# Patient Record
Sex: FEMALE | Race: OTHER | NOT HISPANIC OR LATINO | URBAN - METROPOLITAN AREA
[De-identification: names, ages, dates, MRNs, and addresses within clinical notes are randomized per-mention and may not be internally consistent; named-entity substitution may affect disease eponyms.]

---

## 2021-12-25 ENCOUNTER — INPATIENT (INPATIENT)
Facility: HOSPITAL | Age: 51
LOS: 19 days | Discharge: NURSING, REHAB OR LONG-TERM CARE | DRG: 922 | End: 2022-01-14
Attending: INTERNAL MEDICINE | Admitting: HOSPITALIST
Payer: MEDICARE

## 2021-12-25 VITALS
DIASTOLIC BLOOD PRESSURE: 74 MMHG | SYSTOLIC BLOOD PRESSURE: 122 MMHG | WEIGHT: 104.94 LBS | OXYGEN SATURATION: 97 % | HEART RATE: 112 BPM | TEMPERATURE: 99 F | HEIGHT: 67 IN | RESPIRATION RATE: 17 BRPM

## 2021-12-25 DIAGNOSIS — Z29.9 ENCOUNTER FOR PROPHYLACTIC MEASURES, UNSPECIFIED: ICD-10-CM

## 2021-12-25 DIAGNOSIS — G82.50 QUADRIPLEGIA, UNSPECIFIED: ICD-10-CM

## 2021-12-25 LAB
ALBUMIN SERPL ELPH-MCNC: 4.3 G/DL — SIGNIFICANT CHANGE UP (ref 3.3–5)
ALP SERPL-CCNC: 120 U/L — SIGNIFICANT CHANGE UP (ref 40–120)
ALT FLD-CCNC: 10 U/L — SIGNIFICANT CHANGE UP (ref 10–45)
ANION GAP SERPL CALC-SCNC: 18 MMOL/L — HIGH (ref 5–17)
AST SERPL-CCNC: 18 U/L — SIGNIFICANT CHANGE UP (ref 10–40)
BASOPHILS # BLD AUTO: 0.04 K/UL — SIGNIFICANT CHANGE UP (ref 0–0.2)
BASOPHILS NFR BLD AUTO: 0.7 % — SIGNIFICANT CHANGE UP (ref 0–2)
BILIRUB SERPL-MCNC: 0.5 MG/DL — SIGNIFICANT CHANGE UP (ref 0.2–1.2)
BUN SERPL-MCNC: 10 MG/DL — SIGNIFICANT CHANGE UP (ref 7–23)
CALCIUM SERPL-MCNC: 9.9 MG/DL — SIGNIFICANT CHANGE UP (ref 8.4–10.5)
CHLORIDE SERPL-SCNC: 104 MMOL/L — SIGNIFICANT CHANGE UP (ref 96–108)
CO2 SERPL-SCNC: 20 MMOL/L — LOW (ref 22–31)
CREAT SERPL-MCNC: 0.32 MG/DL — LOW (ref 0.5–1.3)
EOSINOPHIL # BLD AUTO: 0.14 K/UL — SIGNIFICANT CHANGE UP (ref 0–0.5)
EOSINOPHIL NFR BLD AUTO: 2.3 % — SIGNIFICANT CHANGE UP (ref 0–6)
GLUCOSE SERPL-MCNC: 96 MG/DL — SIGNIFICANT CHANGE UP (ref 70–99)
HCT VFR BLD CALC: 41 % — SIGNIFICANT CHANGE UP (ref 34.5–45)
HGB BLD-MCNC: 14 G/DL — SIGNIFICANT CHANGE UP (ref 11.5–15.5)
IMM GRANULOCYTES NFR BLD AUTO: 0.7 % — SIGNIFICANT CHANGE UP (ref 0–1.5)
LYMPHOCYTES # BLD AUTO: 1.61 K/UL — SIGNIFICANT CHANGE UP (ref 1–3.3)
LYMPHOCYTES # BLD AUTO: 26.4 % — SIGNIFICANT CHANGE UP (ref 13–44)
MCHC RBC-ENTMCNC: 32.2 PG — SIGNIFICANT CHANGE UP (ref 27–34)
MCHC RBC-ENTMCNC: 34.1 GM/DL — SIGNIFICANT CHANGE UP (ref 32–36)
MCV RBC AUTO: 94.3 FL — SIGNIFICANT CHANGE UP (ref 80–100)
MONOCYTES # BLD AUTO: 0.52 K/UL — SIGNIFICANT CHANGE UP (ref 0–0.9)
MONOCYTES NFR BLD AUTO: 8.5 % — SIGNIFICANT CHANGE UP (ref 2–14)
NEUTROPHILS # BLD AUTO: 3.75 K/UL — SIGNIFICANT CHANGE UP (ref 1.8–7.4)
NEUTROPHILS NFR BLD AUTO: 61.4 % — SIGNIFICANT CHANGE UP (ref 43–77)
NRBC # BLD: 0 /100 WBCS — SIGNIFICANT CHANGE UP (ref 0–0)
PLATELET # BLD AUTO: 317 K/UL — SIGNIFICANT CHANGE UP (ref 150–400)
POTASSIUM SERPL-MCNC: 4 MMOL/L — SIGNIFICANT CHANGE UP (ref 3.5–5.3)
POTASSIUM SERPL-SCNC: 4 MMOL/L — SIGNIFICANT CHANGE UP (ref 3.5–5.3)
PROT SERPL-MCNC: 7.8 G/DL — SIGNIFICANT CHANGE UP (ref 6–8.3)
RBC # BLD: 4.35 M/UL — SIGNIFICANT CHANGE UP (ref 3.8–5.2)
RBC # FLD: 15.5 % — HIGH (ref 10.3–14.5)
SARS-COV-2 RNA SPEC QL NAA+PROBE: NEGATIVE — SIGNIFICANT CHANGE UP
SODIUM SERPL-SCNC: 142 MMOL/L — SIGNIFICANT CHANGE UP (ref 135–145)
WBC # BLD: 6.1 K/UL — SIGNIFICANT CHANGE UP (ref 3.8–10.5)
WBC # FLD AUTO: 6.1 K/UL — SIGNIFICANT CHANGE UP (ref 3.8–10.5)

## 2021-12-25 PROCEDURE — 99285 EMERGENCY DEPT VISIT HI MDM: CPT

## 2021-12-25 PROCEDURE — 93010 ELECTROCARDIOGRAM REPORT: CPT

## 2021-12-25 PROCEDURE — 99223 1ST HOSP IP/OBS HIGH 75: CPT | Mod: GC

## 2021-12-25 RX ORDER — BACLOFEN 100 %
20 POWDER (GRAM) MISCELLANEOUS ONCE
Refills: 0 | Status: COMPLETED | OUTPATIENT
Start: 2021-12-25 | End: 2021-12-25

## 2021-12-25 RX ORDER — SODIUM CHLORIDE 9 MG/ML
1000 INJECTION INTRAMUSCULAR; INTRAVENOUS; SUBCUTANEOUS ONCE
Refills: 0 | Status: COMPLETED | OUTPATIENT
Start: 2021-12-25 | End: 2021-12-25

## 2021-12-25 RX ORDER — GABAPENTIN 400 MG/1
300 CAPSULE ORAL ONCE
Refills: 0 | Status: COMPLETED | OUTPATIENT
Start: 2021-12-25 | End: 2021-12-25

## 2021-12-25 RX ADMIN — SODIUM CHLORIDE 1000 MILLILITER(S): 9 INJECTION INTRAMUSCULAR; INTRAVENOUS; SUBCUTANEOUS at 22:27

## 2021-12-25 RX ADMIN — GABAPENTIN 300 MILLIGRAM(S): 400 CAPSULE ORAL at 22:05

## 2021-12-25 RX ADMIN — Medication 20 MILLIGRAM(S): at 22:06

## 2021-12-25 RX ADMIN — Medication 0.5 MILLIGRAM(S): at 22:06

## 2021-12-25 NOTE — ED PROVIDER NOTE - EKG ADDITIONAL QUESTION - PERFORMED INDEPENDENT VISUALIZATION
[facility], [place]    Procedure: IR Procedure Note    Date/Time: 7/31/2020 2:32 PM  Performed by: Aleksey Hernandez MD  Authorized by: Aleksey Hernandez MD   IR Fellow Physician:  Radiology Resident Physician: Aleksey Hernandez      UNIVERSAL PROTOCOL   Site Marked: NA  Prior Images Obtained and Reviewed:  Yes  Required items: Required blood products, implants, devices and special equipment available    Patient identity confirmed:  Verbally with patient, arm band, provided demographic data and hospital-assigned identification number  Patient was reevaluated immediately before administering moderate or deep sedation or anesthesia  Confirmation Checklist:  Patient's identity using two indicators, relevant allergies, procedure was appropriate and matched the consent or emergent situation and correct equipment/implants were available  Time out: Immediately prior to the procedure a time out was called    Universal Protocol: the Joint Commission Universal Protocol was followed    Preparation: Patient was prepped and draped in usual sterile fashion           ANESTHESIA    Anesthesia: Local infiltration  Local Anesthetic:  Lidocaine 1% without epinephrine      SEDATION    Patient Sedated: Yes    Sedation:  Midazolam and fentanyl  Vital signs: Vital signs monitored during sedation    See dictated procedure note for full details.  Findings: Successful liver biopsy    Specimens: none    Complications: None    Condition: Stable    Plan: Return to patient care unit for postprocedural care    PROCEDURE   Patient Tolerance:  Patient tolerated the procedure well with no immediate complications    Length of time physician/provider present for 1:1 monitoring during sedation: 25      
Yes
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
## 2021-12-25 NOTE — H&P ADULT - NSHPPHYSICALEXAM_GEN_ALL_CORE
PHYSICAL EXAM:  General: NAD, appears very fatigued, pale  HEENT: NC/AT; PERRL, clear conjunctiva  Neck: supple  Respiratory: CTA b/l  Cardiovascular: holosystolic murmur   Abdomen: soft, NT/ND; +BS x4  : urostomy bag in place. urine clear/light yellow.  Extremities: 2+ peripheral pulses b/l; no LE edema  Skin: normal color and turgor; decubitus sacral ulcer with dressing, ulcer on right ankle with dressing, wound/abrasion on left wrist.   Neurological: AAOx4  Psychiatry:  blunted affect

## 2021-12-25 NOTE — H&P ADULT - NSHPLABSRESULTS_GEN_ALL_CORE
LABS:                        14.0   6.10  )-----------( 317      ( 25 Dec 2021 20:58 )             41.0     12-25    142  |  104  |  10  ----------------------------<  96  4.0   |  20<L>  |  0.32<L>    Ca    9.9      25 Dec 2021 20:58    TPro  7.8  /  Alb  4.3  /  TBili  0.5  /  DBili  x   /  AST  18  /  ALT  10  /  AlkPhos  120  12-25        LIVER FUNCTIONS - ( 25 Dec 2021 20:58 )  Alb: 4.3 g/dL / Pro: 7.8 g/dL / ALK PHOS: 120 U/L / ALT: 10 U/L / AST: 18 U/L / GGT: x

## 2021-12-25 NOTE — H&P ADULT - PROBLEM SELECTOR PLAN 2
- sw consult  - pt & nutrition consutls  - wound consult - sw consult  - pt & nutrition consults  - wound consult
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
## 2021-12-25 NOTE — ED ADULT NURSE NOTE - NSIMPLEMENTINTERV_GEN_ALL_ED
Implemented All Fall with Harm Risk Interventions:  [place] to call system. Call bell, personal items and telephone within reach. Instruct patient to call for assistance. Room bathroom lighting operational. Non-slip footwear when patient is off stretcher. Physically safe environment: no spills, clutter or unnecessary equipment. Stretcher in lowest position, wheels locked, appropriate side rails in place. Provide visual cue, wrist band, yellow gown, etc. Monitor gait and stability. Monitor for mental status changes and reorient to person, place, and time. Review medications for side effects contributing to fall risk. Reinforce activity limits and safety measures with patient and family. Provide visual clues: red socks.
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
## 2021-12-25 NOTE — ED PROVIDER NOTE - PHYSICAL EXAMINATION
CONST: nontoxic NAD speaking in full sentences  HEAD: atraumatic  EYES: conjunctivae clear, PERRL, EOMI  ENT: tacky mucous membranes  NECK: supple/FROM  CARD: tachy regular no murmurs  CHEST: ctab no r/r/w  ABD: soft, nd, nttp, no rebound/guarding  BACK: stage III decub ulcer  EXT: FROM, symmetric distal pulses intact  SKIN: warm, dry, no pedal edema/ttp/rash, cap refill <2sec  NEURO: a+ox3, contractured, 3/5 strength xBUE, 0/5 strength xBLE

## 2021-12-25 NOTE — H&P ADULT - PROBLEM SELECTOR PLAN 1
Patient presenting with Anion Gap 18, CO2 20 on BMP. BHB 2.3. Fatigued on presentation.  Concern for poor nutrition over past 2-3 days. Patient reports not taking PO since being discharged to rehab 12/22 as the facility was not assisting her with feeds. While she reports no dysphagia, she does not have adequate use of her hands to be able to feed herself.   Likely mild fasting ketosis.   - receiving NS bolus and then NS at 200 cc/hr  - nutrition consult  - nursing instructed to provide feeding assitance Patient presenting with Anion Gap 18, CO2 20 on BMP. BHB 2.3. Fatigued on presentation.  Concern for poor nutrition over past 2-3 days. Patient reports not taking PO since being discharged to rehab 12/22 as the facility was not assisting her with feeds. While she reports no dysphagia, she does not have adequate use of her hands to be able to feed herself.   Likely mild fasting ketosis.   - receiving NS bolus and then d5/lr at 100 cc/hr  - nutrition consult  - nursing instructed to provide feeding assistance  - dysphagia screen bedside  - AM lactate, BMP, anion gap f/u

## 2021-12-25 NOTE — ED ADULT TRIAGE NOTE - CHIEF COMPLAINT QUOTE
pt ARIANNA  from [facility] " for hypertension as per EMS pt BP was 150/79 " pt states " my bp is usually low. my back and all by body hurts "

## 2021-12-25 NOTE — H&P ADULT - PROBLEM SELECTOR PLAN 6
F: s/p 1L NS bolus. NS at 200 cc/hr overnight  E: Replete electrolytes as needed, K>4, M>2  N: regular diet  DVT ppx: lovenox 40 q24h  GI ppx: Senna, dulcolax suppository  Dispo:  [facility]    CODE STATUS: Full Code F: s/p 1L NS bolus. D5/LR at 100 cc/hr  E: Replete electrolytes as needed, K>4, M>2  N: regular diet (dysphagia screen)  DVT ppx: lovenox 40 q24h  GI ppx: Senna, dulcolax suppository  Dispo:  [facility]    CODE STATUS: Full Code

## 2021-12-25 NOTE — H&P ADULT - ASSESSMENT
51y yo Female  with PMH of bedbound, remote traumatic c4 quadriplegia, dysreflexia, recently hospitalized for b/l pna ([facility]) discharged to rehab on 12/22, [facility].

## 2021-12-25 NOTE — H&P ADULT - NSHPSOCIALHISTORY_GEN_ALL_CORE
Tobacco: no  EtOH: rare  Drug Use: daily marijuana use smoke and edibles for pain control  Lives in apartment on [address]
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
## 2021-12-26 DIAGNOSIS — L89.90 PRESSURE ULCER OF UNSPECIFIED SITE, UNSPECIFIED STAGE: ICD-10-CM

## 2021-12-26 DIAGNOSIS — D72.10 EOSINOPHILIA, UNSPECIFIED: ICD-10-CM

## 2021-12-26 DIAGNOSIS — E87.2 ACIDOSIS: ICD-10-CM

## 2021-12-26 DIAGNOSIS — T74.01XA ADULT NEGLECT OR ABANDONMENT, CONFIRMED, INITIAL ENCOUNTER: ICD-10-CM

## 2021-12-26 DIAGNOSIS — G62.9 POLYNEUROPATHY, UNSPECIFIED: ICD-10-CM

## 2021-12-26 DIAGNOSIS — R09.89 OTHER SPECIFIED SYMPTOMS AND SIGNS INVOLVING THE CIRCULATORY AND RESPIRATORY SYSTEMS: ICD-10-CM

## 2021-12-26 DIAGNOSIS — R71.0 PRECIPITOUS DROP IN HEMATOCRIT: ICD-10-CM

## 2021-12-26 LAB
ALBUMIN SERPL ELPH-MCNC: 3.4 G/DL — SIGNIFICANT CHANGE UP (ref 3.3–5)
ALP SERPL-CCNC: 89 U/L — SIGNIFICANT CHANGE UP (ref 40–120)
ALT FLD-CCNC: 8 U/L — LOW (ref 10–45)
ANION GAP SERPL CALC-SCNC: 10 MMOL/L — SIGNIFICANT CHANGE UP (ref 5–17)
APPEARANCE UR: CLEAR — SIGNIFICANT CHANGE UP
AST SERPL-CCNC: 13 U/L — SIGNIFICANT CHANGE UP (ref 10–40)
B-OH-BUTYR SERPL-SCNC: 0.7 MMOL/L — HIGH
B-OH-BUTYR SERPL-SCNC: 2.3 MMOL/L — HIGH
BACTERIA # UR AUTO: PRESENT /HPF
BASOPHILS # BLD AUTO: 0.04 K/UL — SIGNIFICANT CHANGE UP (ref 0–0.2)
BASOPHILS # BLD AUTO: 0.04 K/UL — SIGNIFICANT CHANGE UP (ref 0–0.2)
BASOPHILS NFR BLD AUTO: 1 % — SIGNIFICANT CHANGE UP (ref 0–2)
BASOPHILS NFR BLD AUTO: 1 % — SIGNIFICANT CHANGE UP (ref 0–2)
BILIRUB SERPL-MCNC: 0.4 MG/DL — SIGNIFICANT CHANGE UP (ref 0.2–1.2)
BILIRUB UR-MCNC: NEGATIVE — SIGNIFICANT CHANGE UP
BUN SERPL-MCNC: 9 MG/DL — SIGNIFICANT CHANGE UP (ref 7–23)
CALCIUM SERPL-MCNC: 8.5 MG/DL — SIGNIFICANT CHANGE UP (ref 8.4–10.5)
CHLORIDE SERPL-SCNC: 109 MMOL/L — HIGH (ref 96–108)
CO2 SERPL-SCNC: 22 MMOL/L — SIGNIFICANT CHANGE UP (ref 22–31)
COLOR SPEC: YELLOW — SIGNIFICANT CHANGE UP
CREAT SERPL-MCNC: 0.23 MG/DL — LOW (ref 0.5–1.3)
DIFF PNL FLD: ABNORMAL
EOSINOPHIL # BLD AUTO: 0.29 K/UL — SIGNIFICANT CHANGE UP (ref 0–0.5)
EOSINOPHIL # BLD AUTO: 0.3 K/UL — SIGNIFICANT CHANGE UP (ref 0–0.5)
EOSINOPHIL NFR BLD AUTO: 7.4 % — HIGH (ref 0–6)
EOSINOPHIL NFR BLD AUTO: 7.5 % — HIGH (ref 0–6)
EPI CELLS # UR: SIGNIFICANT CHANGE UP /HPF (ref 0–5)
GLUCOSE BLDC GLUCOMTR-MCNC: 102 MG/DL — HIGH (ref 70–99)
GLUCOSE BLDC GLUCOMTR-MCNC: 103 MG/DL — HIGH (ref 70–99)
GLUCOSE BLDC GLUCOMTR-MCNC: 112 MG/DL — HIGH (ref 70–99)
GLUCOSE BLDC GLUCOMTR-MCNC: 98 MG/DL — SIGNIFICANT CHANGE UP (ref 70–99)
GLUCOSE SERPL-MCNC: 107 MG/DL — HIGH (ref 70–99)
GLUCOSE UR QL: NEGATIVE — SIGNIFICANT CHANGE UP
HCT VFR BLD CALC: 31.7 % — LOW (ref 34.5–45)
HCT VFR BLD CALC: 33.3 % — LOW (ref 34.5–45)
HGB BLD-MCNC: 10.7 G/DL — LOW (ref 11.5–15.5)
HGB BLD-MCNC: 11.1 G/DL — LOW (ref 11.5–15.5)
IMM GRANULOCYTES NFR BLD AUTO: 0.3 % — SIGNIFICANT CHANGE UP (ref 0–1.5)
IMM GRANULOCYTES NFR BLD AUTO: 0.5 % — SIGNIFICANT CHANGE UP (ref 0–1.5)
KETONES UR-MCNC: NEGATIVE — SIGNIFICANT CHANGE UP
LACTATE SERPL-SCNC: 0.5 MMOL/L — SIGNIFICANT CHANGE UP (ref 0.5–2)
LEUKOCYTE ESTERASE UR-ACNC: ABNORMAL
LYMPHOCYTES # BLD AUTO: 1.41 K/UL — SIGNIFICANT CHANGE UP (ref 1–3.3)
LYMPHOCYTES # BLD AUTO: 1.54 K/UL — SIGNIFICANT CHANGE UP (ref 1–3.3)
LYMPHOCYTES # BLD AUTO: 34.9 % — SIGNIFICANT CHANGE UP (ref 13–44)
LYMPHOCYTES # BLD AUTO: 40 % — SIGNIFICANT CHANGE UP (ref 13–44)
MAGNESIUM SERPL-MCNC: 1.8 MG/DL — SIGNIFICANT CHANGE UP (ref 1.6–2.6)
MAGNESIUM SERPL-MCNC: 2 MG/DL — SIGNIFICANT CHANGE UP (ref 1.6–2.6)
MCHC RBC-ENTMCNC: 32.6 PG — SIGNIFICANT CHANGE UP (ref 27–34)
MCHC RBC-ENTMCNC: 32.7 PG — SIGNIFICANT CHANGE UP (ref 27–34)
MCHC RBC-ENTMCNC: 33.3 GM/DL — SIGNIFICANT CHANGE UP (ref 32–36)
MCHC RBC-ENTMCNC: 33.8 GM/DL — SIGNIFICANT CHANGE UP (ref 32–36)
MCV RBC AUTO: 96.9 FL — SIGNIFICANT CHANGE UP (ref 80–100)
MCV RBC AUTO: 97.7 FL — SIGNIFICANT CHANGE UP (ref 80–100)
MONOCYTES # BLD AUTO: 0.38 K/UL — SIGNIFICANT CHANGE UP (ref 0–0.9)
MONOCYTES # BLD AUTO: 0.38 K/UL — SIGNIFICANT CHANGE UP (ref 0–0.9)
MONOCYTES NFR BLD AUTO: 9.4 % — SIGNIFICANT CHANGE UP (ref 2–14)
MONOCYTES NFR BLD AUTO: 9.9 % — SIGNIFICANT CHANGE UP (ref 2–14)
NEUTROPHILS # BLD AUTO: 1.59 K/UL — LOW (ref 1.8–7.4)
NEUTROPHILS # BLD AUTO: 1.89 K/UL — SIGNIFICANT CHANGE UP (ref 1.8–7.4)
NEUTROPHILS NFR BLD AUTO: 41.3 % — LOW (ref 43–77)
NEUTROPHILS NFR BLD AUTO: 46.8 % — SIGNIFICANT CHANGE UP (ref 43–77)
NITRITE UR-MCNC: NEGATIVE — SIGNIFICANT CHANGE UP
NRBC # BLD: 0 /100 WBCS — SIGNIFICANT CHANGE UP (ref 0–0)
NRBC # BLD: 0 /100 WBCS — SIGNIFICANT CHANGE UP (ref 0–0)
PH UR: 7 — SIGNIFICANT CHANGE UP (ref 5–8)
PHOSPHATE SERPL-MCNC: 3.1 MG/DL — SIGNIFICANT CHANGE UP (ref 2.5–4.5)
PHOSPHATE SERPL-MCNC: 4.3 MG/DL — SIGNIFICANT CHANGE UP (ref 2.5–4.5)
PLATELET # BLD AUTO: 219 K/UL — SIGNIFICANT CHANGE UP (ref 150–400)
PLATELET # BLD AUTO: 243 K/UL — SIGNIFICANT CHANGE UP (ref 150–400)
POTASSIUM SERPL-MCNC: 3.8 MMOL/L — SIGNIFICANT CHANGE UP (ref 3.5–5.3)
POTASSIUM SERPL-SCNC: 3.8 MMOL/L — SIGNIFICANT CHANGE UP (ref 3.5–5.3)
PROT SERPL-MCNC: 5.8 G/DL — LOW (ref 6–8.3)
PROT UR-MCNC: NEGATIVE MG/DL — SIGNIFICANT CHANGE UP
RBC # BLD: 3.27 M/UL — LOW (ref 3.8–5.2)
RBC # BLD: 3.41 M/UL — LOW (ref 3.8–5.2)
RBC # FLD: 15.6 % — HIGH (ref 10.3–14.5)
RBC # FLD: 15.7 % — HIGH (ref 10.3–14.5)
RBC CASTS # UR COMP ASSIST: < 5 /HPF — SIGNIFICANT CHANGE UP
SODIUM SERPL-SCNC: 141 MMOL/L — SIGNIFICANT CHANGE UP (ref 135–145)
SP GR SPEC: 1.02 — SIGNIFICANT CHANGE UP (ref 1–1.03)
UROBILINOGEN FLD QL: 0.2 E.U./DL — SIGNIFICANT CHANGE UP
WBC # BLD: 3.85 K/UL — SIGNIFICANT CHANGE UP (ref 3.8–10.5)
WBC # BLD: 4.04 K/UL — SIGNIFICANT CHANGE UP (ref 3.8–10.5)
WBC # FLD AUTO: 3.85 K/UL — SIGNIFICANT CHANGE UP (ref 3.8–10.5)
WBC # FLD AUTO: 4.04 K/UL — SIGNIFICANT CHANGE UP (ref 3.8–10.5)
WBC UR QL: < 5 /HPF — SIGNIFICANT CHANGE UP

## 2021-12-26 PROCEDURE — 99232 SBSQ HOSP IP/OBS MODERATE 35: CPT | Mod: GC

## 2021-12-26 RX ORDER — KETOROLAC TROMETHAMINE 30 MG/ML
15 SYRINGE (ML) INJECTION ONCE
Refills: 0 | Status: DISCONTINUED | OUTPATIENT
Start: 2021-12-26 | End: 2021-12-26

## 2021-12-26 RX ORDER — SODIUM CHLORIDE 9 MG/ML
1000 INJECTION, SOLUTION INTRAVENOUS
Refills: 0 | Status: DISCONTINUED | OUTPATIENT
Start: 2021-12-26 | End: 2021-12-26

## 2021-12-26 RX ORDER — SODIUM CHLORIDE 9 MG/ML
1000 INJECTION INTRAMUSCULAR; INTRAVENOUS; SUBCUTANEOUS
Refills: 0 | Status: DISCONTINUED | OUTPATIENT
Start: 2021-12-26 | End: 2021-12-26

## 2021-12-26 RX ORDER — BACLOFEN 100 %
20 POWDER (GRAM) MISCELLANEOUS DAILY
Refills: 0 | Status: DISCONTINUED | OUTPATIENT
Start: 2021-12-26 | End: 2021-12-26

## 2021-12-26 RX ORDER — INFLUENZA VIRUS VACCINE 15; 15; 15; 15 UG/.5ML; UG/.5ML; UG/.5ML; UG/.5ML
0.5 SUSPENSION INTRAMUSCULAR ONCE
Refills: 0 | Status: DISCONTINUED | OUTPATIENT
Start: 2021-12-26 | End: 2022-01-14

## 2021-12-26 RX ORDER — GABAPENTIN 400 MG/1
300 CAPSULE ORAL THREE TIMES A DAY
Refills: 0 | Status: DISCONTINUED | OUTPATIENT
Start: 2021-12-26 | End: 2022-01-14

## 2021-12-26 RX ORDER — ASCORBIC ACID 60 MG
500 TABLET,CHEWABLE ORAL DAILY
Refills: 0 | Status: DISCONTINUED | OUTPATIENT
Start: 2021-12-26 | End: 2022-01-14

## 2021-12-26 RX ORDER — SENNA PLUS 8.6 MG/1
2 TABLET ORAL AT BEDTIME
Refills: 0 | Status: DISCONTINUED | OUTPATIENT
Start: 2021-12-26 | End: 2021-12-28

## 2021-12-26 RX ORDER — ENOXAPARIN SODIUM 100 MG/ML
40 INJECTION SUBCUTANEOUS ONCE
Refills: 0 | Status: COMPLETED | OUTPATIENT
Start: 2021-12-26 | End: 2021-12-26

## 2021-12-26 RX ORDER — SODIUM CHLORIDE 9 MG/ML
1000 INJECTION INTRAMUSCULAR; INTRAVENOUS; SUBCUTANEOUS ONCE
Refills: 0 | Status: COMPLETED | OUTPATIENT
Start: 2021-12-26 | End: 2021-12-26

## 2021-12-26 RX ORDER — GABAPENTIN 400 MG/1
300 CAPSULE ORAL EVERY 24 HOURS
Refills: 0 | Status: DISCONTINUED | OUTPATIENT
Start: 2021-12-26 | End: 2021-12-26

## 2021-12-26 RX ORDER — ENOXAPARIN SODIUM 100 MG/ML
40 INJECTION SUBCUTANEOUS AT BEDTIME
Refills: 0 | Status: DISCONTINUED | OUTPATIENT
Start: 2021-12-26 | End: 2022-01-14

## 2021-12-26 RX ORDER — ZINC SULFATE TAB 220 MG (50 MG ZINC EQUIVALENT) 220 (50 ZN) MG
220 TAB ORAL DAILY
Refills: 0 | Status: DISCONTINUED | OUTPATIENT
Start: 2021-12-26 | End: 2022-01-14

## 2021-12-26 RX ORDER — BACLOFEN 100 %
20 POWDER (GRAM) MISCELLANEOUS THREE TIMES A DAY
Refills: 0 | Status: DISCONTINUED | OUTPATIENT
Start: 2021-12-26 | End: 2022-01-14

## 2021-12-26 RX ORDER — AMITRIPTYLINE HCL 25 MG
25 TABLET ORAL AT BEDTIME
Refills: 0 | Status: DISCONTINUED | OUTPATIENT
Start: 2021-12-26 | End: 2022-01-14

## 2021-12-26 RX ADMIN — Medication 20 MILLIGRAM(S): at 11:27

## 2021-12-26 RX ADMIN — Medication 25 MILLIGRAM(S): at 21:47

## 2021-12-26 RX ADMIN — GABAPENTIN 300 MILLIGRAM(S): 400 CAPSULE ORAL at 21:47

## 2021-12-26 RX ADMIN — SODIUM CHLORIDE 200 MILLILITER(S): 9 INJECTION INTRAMUSCULAR; INTRAVENOUS; SUBCUTANEOUS at 01:03

## 2021-12-26 RX ADMIN — Medication 500 MILLIGRAM(S): at 11:27

## 2021-12-26 RX ADMIN — ZINC SULFATE TAB 220 MG (50 MG ZINC EQUIVALENT) 220 MILLIGRAM(S): 220 (50 ZN) TAB at 11:27

## 2021-12-26 RX ADMIN — Medication 15 MILLIGRAM(S): at 03:32

## 2021-12-26 RX ADMIN — Medication 10 MILLIGRAM(S): at 21:47

## 2021-12-26 RX ADMIN — SENNA PLUS 2 TABLET(S): 8.6 TABLET ORAL at 21:47

## 2021-12-26 RX ADMIN — Medication 1 TABLET(S): at 11:26

## 2021-12-26 RX ADMIN — Medication 20 MILLIGRAM(S): at 23:28

## 2021-12-26 RX ADMIN — Medication 15 MILLIGRAM(S): at 03:17

## 2021-12-26 RX ADMIN — ENOXAPARIN SODIUM 40 MILLIGRAM(S): 100 INJECTION SUBCUTANEOUS at 02:13

## 2021-12-26 RX ADMIN — SODIUM CHLORIDE 100 MILLILITER(S): 9 INJECTION, SOLUTION INTRAVENOUS at 04:30

## 2021-12-26 RX ADMIN — Medication 10 MILLIGRAM(S): at 02:13

## 2021-12-26 RX ADMIN — GABAPENTIN 300 MILLIGRAM(S): 400 CAPSULE ORAL at 06:20

## 2021-12-26 RX ADMIN — SODIUM CHLORIDE 1000 MILLILITER(S): 9 INJECTION INTRAMUSCULAR; INTRAVENOUS; SUBCUTANEOUS at 03:15

## 2021-12-26 RX ADMIN — ENOXAPARIN SODIUM 40 MILLIGRAM(S): 100 INJECTION SUBCUTANEOUS at 21:46

## 2021-12-26 NOTE — DIETITIAN INITIAL EVALUATION ADULT. - PROBLEM SELECTOR PLAN 6
F: s/p 1L NS bolus. D5/LR at 100 cc/hr  E: Replete electrolytes as needed, K>4, M>2  N: regular diet (dysphagia screen)  DVT ppx: lovenox 40 q24h  GI ppx: Senna, dulcolax suppository  Dispo:  [facility]    CODE STATUS: Full Code
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
## 2021-12-26 NOTE — DIETITIAN INITIAL EVALUATION ADULT. - PROBLEM SELECTOR PLAN 1
Patient presenting with Anion Gap 18, CO2 20 on BMP. BHB 2.3. Fatigued on presentation.  Concern for poor nutrition over past 2-3 days. Patient reports not taking PO since being discharged to rehab 12/22 as the facility was not assisting her with feeds. While she reports no dysphagia, she does not have adequate use of her hands to be able to feed herself.   Likely mild fasting ketosis.   - receiving NS bolus and then d5/lr at 100 cc/hr  - nutrition consult  - nursing instructed to provide feeding assistance  - dysphagia screen bedside  - AM lactate, BMP, anion gap f/u

## 2021-12-26 NOTE — PROGRESS NOTE ADULT - PROBLEM SELECTOR PLAN 4
- SW consult  - PT consulted, recommended home w/ 24/7 assist as PT is at baseline functional status  - nutrition consulted, recommended supplementing ensure plant-based 1 can TID, vit C, MV, and zinc  - wound consult

## 2021-12-26 NOTE — PHYSICAL THERAPY INITIAL EVALUATION ADULT - IMPAIRMENTS CONTRIBUTING IMPAIRED BED MOBILITY, REHAB EVAL
impaired balance/decreased flexibility/impaired motor control/abnormal muscle tone/impaired postural control/decreased strength

## 2021-12-26 NOTE — PHYSICAL THERAPY INITIAL EVALUATION ADULT - BED MOBILITY LIMITATIONS, REHAB EVAL
Pt requires assist for rolling, then uses elbow flexion to hook onto bedrails to maintain position./decreased ability to use arms for pushing/pulling/decreased ability to use legs for bridging/pushing/impaired ability to control trunk for mobility

## 2021-12-26 NOTE — DIETITIAN INITIAL EVALUATION ADULT. - ADD RECOMMEND
1. Recommend Ensure Plant BID 2. MVI, Zinc, Vitamin C. 3. Support and encouragement at meal times 4. Trend weights for accuracy. 5. Encourage protein at meals.
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
## 2021-12-26 NOTE — PATIENT PROFILE ADULT - FALL HARM RISK - HARM RISK INTERVENTIONS
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
## 2021-12-26 NOTE — PATIENT PROFILE ADULT - HOME/WORK/SCHOOL SAFETY PLAN
Pt states, "Bed is too hard for her ulcers, she is not turned frequently enough, needs stronger pain meds"
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
## 2021-12-26 NOTE — PROGRESS NOTE ADULT - PROBLEM SELECTOR PLAN 9
F: s/p 1L NS bolus, s/p D5/LR 1L  E: Replete electrolytes as needed, maintain K>4, M>2  N: regular diet w/ ensure plant-based 1 can TID    DVT ppx: lovenox 40 q24h  GI ppx: Senna, dulcolax suppository    Code status: FULL CODE    Dispo: pending, pt desires d/c to home
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
## 2021-12-26 NOTE — DIETITIAN INITIAL EVALUATION ADULT. - OTHER CALCULATIONS
ABW used for calculations as pt between % of IBW (86%). Nutrient needs based on [facility] standards of care for maintenance in older adults. IBW and needs adjusted for quadriplegia, decubitus pressure ulcer, suspected severe PCM
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
## 2021-12-26 NOTE — PROGRESS NOTE ADULT - SUBJECTIVE AND OBJECTIVE BOX
INTERVAL HPI/OVERNIGHT EVENTS:  Patient was seen and examined at bedside.   Pt admitted o/n, resting comfortably this AM. Reports feeling a little cold, requested to be repositioned, which was done by writer assisted by a PCA. Pressure injuries still slightly painful but better in dressings. Pt offers no other complaints at this time, is still tired but feeling better. Patient denies: fever, chills, dizziness, weakness, HA, changes in vision, CP, palpitations, SOB, cough, N/V/D/C, changes in bowel movements or urostomy output, LE edema. ROS otherwise negative.        VITAL SIGNS:  T(F): 98.1 (21 @ 21:01)  HR: 74 (21 @ 21:01)  BP: 92/61 (21 @ 21:01)  RR: 18 (21 @ 21:01)  SpO2: 98% (21 @ 21:01)  Wt(kg): --    PHYSICAL EXAM:    Constitutional: thin, middle-aged female resting comfortably in bed, NAD  HEENT: PERRL, EOMI grossly, sclera anicteric, neck supple, no JVD, MMM, good dentition  Respiratory: CTA b/l; no wheezing, no rhonchi, no rales; unlabored respirations  Cardiovascular: RRR, normal S1, S2; +holosystolic murmur (pt confirms present for most of her life) no R/G  Gastrointestinal: soft, NT/ND; no masses palpable; BS normoactive x4 quadrants  Extremities: Warm and well perfused; 2+ pulses equal bilateral lower extremities, R radial pulse palpable, L radial pulse 2+; no edema, cyanosis, or clubbing  Neurological: AOx3, CN II-XII grossly intact, marked atrophy of b/l LE>UE, unable to move b/l LE but able to move b/l UE   Skin: dressings covering pressure injuries on L wrist, R ankle, and sacrum    MEDICATIONS  (STANDING):  amitriptyline 25 milliGRAM(s) Oral at bedtime  ascorbic acid 500 milliGRAM(s) Oral daily  baclofen 20 milliGRAM(s) Oral three times a day  bisacodyl Suppository 10 milliGRAM(s) Rectal at bedtime  enoxaparin Injectable 40 milliGRAM(s) SubCutaneous at bedtime  gabapentin 300 milliGRAM(s) Oral three times a day  influenza   Vaccine 0.5 milliLiter(s) IntraMuscular once  multivitamin 1 Tablet(s) Oral daily  senna 2 Tablet(s) Oral at bedtime  zinc sulfate 220 milliGRAM(s) Oral daily    MEDICATIONS  (PRN):      Allergies    No Known Allergies    Intolerances        LABS:                        11.1   3.85  )-----------( 219      ( 26 Dec 2021 12:21 )             33.3         141  |  109<H>  |  9   ----------------------------<  107<H>  3.8   |  22  |  0.23<L>    Ca    8.5      26 Dec 2021 08:37  Phos  4.3       Mg     1.8         TPro  5.8<L>  /  Alb  3.4  /  TBili  0.4  /  DBili  x   /  AST  13  /  ALT  8<L>  /  AlkPhos  89        Urinalysis Basic - ( 25 Dec 2021 23:28 )    Color: Yellow / Appearance: Clear / S.020 / pH: x  Gluc: x / Ketone: NEGATIVE  / Bili: Negative / Urobili: 0.2 E.U./dL   Blood: x / Protein: NEGATIVE mg/dL / Nitrite: NEGATIVE   Leuk Esterase: Trace / RBC: < 5 /HPF / WBC < 5 /HPF   Sq Epi: x / Non Sq Epi: 0-5 /HPF / Bacteria: Present /HPF      CAPILLARY BLOOD GLUCOSE      POCT Blood Glucose.: 103 mg/dL (26 Dec 2021 15:11)  POCT Blood Glucose.: 98 mg/dL (26 Dec 2021 09:28)  POCT Blood Glucose.: 112 mg/dL (26 Dec 2021 06:18)  POCT Blood Glucose.: 102 mg/dL (26 Dec 2021 03:14)    MICROBIOLOGY:  Urinalysis with Rflx Culture (collected 25 Dec 2021 23:28)             INTERVAL HPI/OVERNIGHT EVENTS:  Patient was seen and examined at bedside.   Pt admitted o/n, resting comfortably this AM. Reports feeling a little cold, requested to be repositioned, which was done by writer assisted by a PCA. Pressure injuries still slightly painful but better in dressings. Pt offers no other complaints at this time, is still tired but feeling better. Patient denies: fever, chills, dizziness, weakness, HA, changes in vision, CP, palpitations, SOB, cough, N/V/D/C, changes in bowel movements or urostomy output, LE edema. ROS otherwise negative.        VITAL SIGNS:  T(F): 98.1 (21 @ 21:01)  HR: 74 (21 @ 21:01)  BP: 92/61 (21 @ 21:01)  RR: 18 (21 @ 21:01)  SpO2: 98% (21 @ 21:01)  Wt(kg): --    PHYSICAL EXAM:    Constitutional: thin, middle-aged female resting comfortably in bed, NAD  HEENT: PERRL, EOMI grossly, sclera anicteric, neck supple, no JVD, MMM, good dentition  Respiratory: CTA b/l; no wheezing, no rhonchi, no rales; unlabored respirations  Cardiovascular: RRR, normal S1, S2; +holosystolic murmur (pt confirms present for most of her life) no R/G  Gastrointestinal: soft, NT/ND; no masses palpable; BS normoactive x4 quadrants  : urostomy w/ clear yellow output  Extremities: Warm and well perfused; 2+ pulses equal bilateral lower extremities, R radial pulse palpable, L radial pulse 2+; no edema, cyanosis, or clubbing  Neurological: AOx3, CN II-XII grossly intact, marked atrophy of b/l LE>UE, unable to move b/l LE but able to move b/l UE   Skin: dressings covering pressure injuries on L wrist, R ankle, and sacrum    MEDICATIONS  (STANDING):  amitriptyline 25 milliGRAM(s) Oral at bedtime  ascorbic acid 500 milliGRAM(s) Oral daily  baclofen 20 milliGRAM(s) Oral three times a day  bisacodyl Suppository 10 milliGRAM(s) Rectal at bedtime  enoxaparin Injectable 40 milliGRAM(s) SubCutaneous at bedtime  gabapentin 300 milliGRAM(s) Oral three times a day  influenza   Vaccine 0.5 milliLiter(s) IntraMuscular once  multivitamin 1 Tablet(s) Oral daily  senna 2 Tablet(s) Oral at bedtime  zinc sulfate 220 milliGRAM(s) Oral daily    MEDICATIONS  (PRN):      Allergies    No Known Allergies    Intolerances        LABS:                        11.1   3.85  )-----------( 219      ( 26 Dec 2021 12:21 )             33.3         141  |  109<H>  |  9   ----------------------------<  107<H>  3.8   |  22  |  0.23<L>    Ca    8.5      26 Dec 2021 08:37  Phos  4.3       Mg     1.8         TPro  5.8<L>  /  Alb  3.4  /  TBili  0.4  /  DBili  x   /  AST  13  /  ALT  8<L>  /  AlkPhos  89        Urinalysis Basic - ( 25 Dec 2021 23:28 )    Color: Yellow / Appearance: Clear / S.020 / pH: x  Gluc: x / Ketone: NEGATIVE  / Bili: Negative / Urobili: 0.2 E.U./dL   Blood: x / Protein: NEGATIVE mg/dL / Nitrite: NEGATIVE   Leuk Esterase: Trace / RBC: < 5 /HPF / WBC < 5 /HPF   Sq Epi: x / Non Sq Epi: 0-5 /HPF / Bacteria: Present /HPF      CAPILLARY BLOOD GLUCOSE      POCT Blood Glucose.: 103 mg/dL (26 Dec 2021 15:11)  POCT Blood Glucose.: 98 mg/dL (26 Dec 2021 09:28)  POCT Blood Glucose.: 112 mg/dL (26 Dec 2021 06:18)  POCT Blood Glucose.: 102 mg/dL (26 Dec 2021 03:14)    MICROBIOLOGY:  Urinalysis with Rflx Culture (collected 25 Dec 2021 23:28)

## 2021-12-26 NOTE — PROGRESS NOTE ADULT - ATTENDING COMMENTS
Patient was seen and examined at bedside on 12/26/2021 at 10 am  -Agree with exam and history as above  -Patient has no acute complaints, feels tired  -Nutrition, wound care consult  -Pending [facility] vs home

## 2021-12-26 NOTE — PROGRESS NOTE ADULT - PROBLEM SELECTOR PLAN 1
RESOLVED    Patient presenting with Anion Gap 18, CO2 20 on BMP->closed to 10. BHB 2.3->0.7, lactate 0.5. Fatigued on presentation.  Concern for poor nutrition over past 2-3 days. Patient reports not taking PO since being discharged to rehab 12/22 as the facility was not assisting her with feeds. While she reports no dysphagia, she does not have adequate use of her hands to be able to feed herself.   Likely mild fasting ketosis.   - s/p 1LNS bolus and 1L D5-LR   - nutrition consult  - nursing instructed to provide feeding assistance

## 2021-12-26 NOTE — DIETITIAN INITIAL EVALUATION ADULT. - NUTRITIONGOAL OUTCOME1
[facility]  ED Nurse Handoff Report    Veronica Cody is a 24 year old female   ED Chief complaint: Abdominal Pain  . ED Diagnosis:   Final diagnoses:   Acute pancreatitis, unspecified complication status, unspecified pancreatitis type   Non-intractable vomiting with nausea, unspecified vomiting type   Hematemesis with nausea   Acute UTI     Allergies: No Known Allergies    Code Status: Full Code  Activity level - Baseline/Home:  Independent. Activity Level - Current:   Independent. Lift room needed: No. Bariatric: No   Needed: No   Isolation: No. Infection: Not Applicable.     Vital Signs:   Vitals:    12/26/17 1035 12/26/17 1056 12/26/17 1058 12/26/17 1100   BP:  (!) 127/92     Pulse:       Resp:       Temp:       TempSrc:       SpO2: 100%  98% 99%       Cardiac Rhythm:  ,      Pain level: 0-10 Pain Scale: 6  Patient confused: No. Patient Falls Risk: Yes.   Elimination Status: Has voided   Patient Report - Initial Complaint: abdominal pain for three days, fevers at night. Decreased appetite, nauseated. Focused Assessment: Gastrointestinal - GI WDL:  WDL except (epigastric pain and also pain in back for three days. has not been ablet to eat or drink. States running fevers in the evenings. )  Tests Performed:  US Abdomen Limited   Preliminary Result   IMPRESSION:     1. Hepatomegaly and diffuse fatty infiltration of the liver with   coarsened hepatic echotexture.   2. Pancreas partially obscured by gas.   3. No specific etiology of abdominal pain is seen.         . Abnormal Results:   Labs Ordered and Resulted from Time of ED Arrival Up to the Time of Departure from the ED   LIPASE - Abnormal; Notable for the following:        Result Value    Lipase 2623 (*)     All other components within normal limits   ROUTINE UA WITH MICROSCOPIC - Abnormal; Notable for the following:     Ketones Urine 20 (*)     Blood Urine Large (*)     Protein Albumin Urine 30 (*)     Leukocyte Esterase Urine Moderate  (*)     WBC Urine 31 (*)     RBC Urine 4 (*)     Bacteria Urine Few (*)     Squamous Epithelial /HPF Urine 7 (*)     Mucous Urine Present (*)     All other components within normal limits   HCG QUALITATIVE URINE   PERIPHERAL IV CATHETER   URINE CULTURE AEROBIC BACTERIAL     Treatments provided:fluids, rocephin, morphine, protonix.  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  OBS brochure/video discussed/provided to patient:  N/A  ED Medications:   Medications   0.9% sodium chloride BOLUS (0 mLs Intravenous Stopped 12/26/17 1204)     Followed by   0.9% sodium chloride infusion (not administered)   morphine (PF) injection 4 mg (4 mg Intravenous Given 12/26/17 1030)   cefTRIAXone in d5w (ROCEPHIN) intermittent infusion 1 g (1 g Intravenous New Bag 12/26/17 1205)   pantoprazole (PROTONIX) 40 mg IV push injection (40 mg Intravenous Given 12/26/17 1030)     Drips infusing:  No  For the majority of the shift, the patient's behavior Green. Interventions performed were talking with admissions re: help for insurance/information to apply for aid.     Severe Sepsis OR Septic Shock Diagnosis Present: No      ED Nurse Name/Phone Number: Lara Peri,   12:19 PM    RECEIVING UNIT ED HANDOFF REVIEW    Above ED Nurse Handoff Report was reviewed: yes  Reviewed by: Cindy Tao on December 26, 2017 at 1:56 PM        Pt to consistently meet % of estimated needs PO

## 2021-12-26 NOTE — PROGRESS NOTE ADULT - PROBLEM SELECTOR PLAN 3
Eos 7.4% (AbsEo 0.3) on 12/26 AM labs, increased from 2.3% at presentation. Possibly 2/2 medication in ED (ketorolac vs ativan). DDx also includes less likely parasitic vs unlikely neoplastic.   - monitor Eo% and AbsEo

## 2021-12-26 NOTE — PROGRESS NOTE ADULT - PROBLEM SELECTOR PLAN 2
Hgb 14 on admission, decreased to 10.7 (MCV 96.9) s/p 1L NS and ~500ccs D5-LR. No s/s of bleeding, pt denies any recent bleed. Possibly hemodilution.   - Hgb stable to 11.1 on repeat  - monitor CBC

## 2021-12-27 ENCOUNTER — TRANSCRIPTION ENCOUNTER (OUTPATIENT)
Age: 51
End: 2021-12-27

## 2021-12-27 DIAGNOSIS — R53.2 FUNCTIONAL QUADRIPLEGIA: ICD-10-CM

## 2021-12-27 LAB
ANION GAP SERPL CALC-SCNC: 9 MMOL/L — SIGNIFICANT CHANGE UP (ref 5–17)
BASOPHILS # BLD AUTO: 0.04 K/UL — SIGNIFICANT CHANGE UP (ref 0–0.2)
BASOPHILS NFR BLD AUTO: 0.9 % — SIGNIFICANT CHANGE UP (ref 0–2)
BUN SERPL-MCNC: 12 MG/DL — SIGNIFICANT CHANGE UP (ref 7–23)
CALCIUM SERPL-MCNC: 9.2 MG/DL — SIGNIFICANT CHANGE UP (ref 8.4–10.5)
CHLORIDE SERPL-SCNC: 107 MMOL/L — SIGNIFICANT CHANGE UP (ref 96–108)
CO2 SERPL-SCNC: 24 MMOL/L — SIGNIFICANT CHANGE UP (ref 22–31)
CREAT SERPL-MCNC: 0.24 MG/DL — LOW (ref 0.5–1.3)
EOSINOPHIL # BLD AUTO: 0.28 K/UL — SIGNIFICANT CHANGE UP (ref 0–0.5)
EOSINOPHIL NFR BLD AUTO: 6.6 % — HIGH (ref 0–6)
GLUCOSE SERPL-MCNC: 92 MG/DL — SIGNIFICANT CHANGE UP (ref 70–99)
HCT VFR BLD CALC: 35.5 % — SIGNIFICANT CHANGE UP (ref 34.5–45)
HGB BLD-MCNC: 11.5 G/DL — SIGNIFICANT CHANGE UP (ref 11.5–15.5)
IMM GRANULOCYTES NFR BLD AUTO: 0.2 % — SIGNIFICANT CHANGE UP (ref 0–1.5)
LYMPHOCYTES # BLD AUTO: 1.21 K/UL — SIGNIFICANT CHANGE UP (ref 1–3.3)
LYMPHOCYTES # BLD AUTO: 28.7 % — SIGNIFICANT CHANGE UP (ref 13–44)
MAGNESIUM SERPL-MCNC: 1.9 MG/DL — SIGNIFICANT CHANGE UP (ref 1.6–2.6)
MCHC RBC-ENTMCNC: 30.7 PG — SIGNIFICANT CHANGE UP (ref 27–34)
MCHC RBC-ENTMCNC: 32.4 GM/DL — SIGNIFICANT CHANGE UP (ref 32–36)
MCV RBC AUTO: 94.9 FL — SIGNIFICANT CHANGE UP (ref 80–100)
MONOCYTES # BLD AUTO: 0.37 K/UL — SIGNIFICANT CHANGE UP (ref 0–0.9)
MONOCYTES NFR BLD AUTO: 8.8 % — SIGNIFICANT CHANGE UP (ref 2–14)
NEUTROPHILS # BLD AUTO: 2.31 K/UL — SIGNIFICANT CHANGE UP (ref 1.8–7.4)
NEUTROPHILS NFR BLD AUTO: 54.8 % — SIGNIFICANT CHANGE UP (ref 43–77)
NRBC # BLD: 0 /100 WBCS — SIGNIFICANT CHANGE UP (ref 0–0)
PLATELET # BLD AUTO: 268 K/UL — SIGNIFICANT CHANGE UP (ref 150–400)
POTASSIUM SERPL-MCNC: 4.1 MMOL/L — SIGNIFICANT CHANGE UP (ref 3.5–5.3)
POTASSIUM SERPL-SCNC: 4.1 MMOL/L — SIGNIFICANT CHANGE UP (ref 3.5–5.3)
RBC # BLD: 3.74 M/UL — LOW (ref 3.8–5.2)
RBC # FLD: 15.6 % — HIGH (ref 10.3–14.5)
SODIUM SERPL-SCNC: 140 MMOL/L — SIGNIFICANT CHANGE UP (ref 135–145)
WBC # BLD: 4.22 K/UL — SIGNIFICANT CHANGE UP (ref 3.8–10.5)
WBC # FLD AUTO: 4.22 K/UL — SIGNIFICANT CHANGE UP (ref 3.8–10.5)

## 2021-12-27 PROCEDURE — 99232 SBSQ HOSP IP/OBS MODERATE 35: CPT | Mod: GC

## 2021-12-27 RX ORDER — MULTIVIT WITH MIN/MFOLATE/K2 340-15/3 G
1 POWDER (GRAM) ORAL ONCE
Refills: 0 | Status: COMPLETED | OUTPATIENT
Start: 2021-12-27 | End: 2021-12-27

## 2021-12-27 RX ORDER — IBUPROFEN 200 MG
200 TABLET ORAL EVERY 12 HOURS
Refills: 0 | Status: COMPLETED | OUTPATIENT
Start: 2021-12-27 | End: 2022-01-01

## 2021-12-27 RX ORDER — DRONABINOL 2.5 MG
2.5 CAPSULE ORAL AT BEDTIME
Refills: 0 | Status: DISCONTINUED | OUTPATIENT
Start: 2021-12-27 | End: 2021-12-27

## 2021-12-27 RX ORDER — SODIUM CHLORIDE 9 MG/ML
500 INJECTION, SOLUTION INTRAVENOUS ONCE
Refills: 0 | Status: COMPLETED | OUTPATIENT
Start: 2021-12-27 | End: 2021-12-27

## 2021-12-27 RX ORDER — DRONABINOL 2.5 MG
2.5 CAPSULE ORAL EVERY 12 HOURS
Refills: 0 | Status: DISCONTINUED | OUTPATIENT
Start: 2021-12-27 | End: 2022-01-03

## 2021-12-27 RX ADMIN — SENNA PLUS 2 TABLET(S): 8.6 TABLET ORAL at 21:45

## 2021-12-27 RX ADMIN — Medication 20 MILLIGRAM(S): at 06:07

## 2021-12-27 RX ADMIN — Medication 200 MILLIGRAM(S): at 22:16

## 2021-12-27 RX ADMIN — GABAPENTIN 300 MILLIGRAM(S): 400 CAPSULE ORAL at 06:07

## 2021-12-27 RX ADMIN — Medication 200 MILLIGRAM(S): at 21:46

## 2021-12-27 RX ADMIN — Medication 0.5 MILLIGRAM(S): at 01:27

## 2021-12-27 RX ADMIN — ZINC SULFATE TAB 220 MG (50 MG ZINC EQUIVALENT) 220 MILLIGRAM(S): 220 (50 ZN) TAB at 14:48

## 2021-12-27 RX ADMIN — Medication 2.5 MILLIGRAM(S): at 21:52

## 2021-12-27 RX ADMIN — Medication 500 MILLIGRAM(S): at 14:48

## 2021-12-27 RX ADMIN — GABAPENTIN 300 MILLIGRAM(S): 400 CAPSULE ORAL at 14:48

## 2021-12-27 RX ADMIN — Medication 1 TABLET(S): at 14:48

## 2021-12-27 RX ADMIN — ENOXAPARIN SODIUM 40 MILLIGRAM(S): 100 INJECTION SUBCUTANEOUS at 21:48

## 2021-12-27 RX ADMIN — Medication 1 BOTTLE: at 22:51

## 2021-12-27 RX ADMIN — Medication 25 MILLIGRAM(S): at 21:45

## 2021-12-27 RX ADMIN — Medication 10 MILLIGRAM(S): at 21:45

## 2021-12-27 RX ADMIN — Medication 20 MILLIGRAM(S): at 21:46

## 2021-12-27 RX ADMIN — Medication 20 MILLIGRAM(S): at 14:48

## 2021-12-27 RX ADMIN — GABAPENTIN 300 MILLIGRAM(S): 400 CAPSULE ORAL at 21:44

## 2021-12-27 NOTE — DISCHARGE NOTE PROVIDER - PROVIDER TOKENS
FREE:[LAST:[Navi],FIRST:[Kaley],PHONE:[phone]:[(   )    -],FOLLOWUP:[2 weeks],ESTABLISHEDPATIENT:[T]] FREE:[LAST:[Navi],FIRST:[Kaley],PHONE:[phone]:[(   )    -],ADDRESS:[[address] [place] [address]],FOLLOWUP:[2 weeks],ESTABLISHEDPATIENT:[T]] FREE:[LAST:[Mcelroy],FIRST:[Kaley (RAVEN)],PHONE:[phone]],FAX:[(   )    -],ADDRESS:[[facility]  INTERNAL MEDICINE  [address] [place] [address]],SCHEDULEDAPPT:[01/14/2022],SCHEDULEDAPPTTIME:[04:30 PM],ESTABLISHEDPATIENT:[T]]

## 2021-12-27 NOTE — DISCHARGE NOTE PROVIDER - NSDCFUADDAPPT_GEN_ALL_CORE_FT
Please bring your Insurance card, Photo ID,  and Discharge paperwork to the following appointment:    (1) Please follow up with your Primary Care Provider, Nurse Practitioner, Kaley Mcelroy at [address] [place] [address], [address] [place] [address] on 01/14/2022 at 4:30pm.    Appointment was scheduled by Ms. JANEEN Farley, Referral Coordinator.
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
## 2021-12-27 NOTE — PROGRESS NOTE ADULT - PROBLEM SELECTOR PLAN 5
Patient with new decub ulcers which arose at rehab facility due to neglect and bedding inappropriate for her condition, and lack of adequate turning.   - Wound care consult  - frequent repositioning and dressing changes per nursing  - SW consult
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
## 2021-12-27 NOTE — PROGRESS NOTE ADULT - PROBLEM SELECTOR PLAN 6
- c/w home meds:  - Gabapentin 300mg PO TID   - Amitryptiline 25mg PO daily  - Baclofen 20mg PO TID  - med rec re: dronabinol dose
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
## 2021-12-27 NOTE — DISCHARGE NOTE PROVIDER - INSTRUCTIONS
Our nutritionist recommended that you supplement your diet with 1 Ensure Plant-Based can 2-3 times a day

## 2021-12-27 NOTE — DISCHARGE NOTE PROVIDER - NSDCMRMEDTOKEN_GEN_ALL_CORE_FT
AMITRIPTYLINE 25MG TABLETS: TAKE 1 TABLET BY MOUTH AT BEDTIME FOR NERVE PAIN  BACLOFEN 20MG TAB:   BISACODYL 10MG SUPPOSITORIES: INSERT 1 SUPPOSITORY RECTALLY EVERY NIGHT AT BEDTIME AS NEEDED  GABAPENTIN 300MG CAP:    AMITRIPTYLINE 25MG TABLETS: 1 tab(s) orally once a day (at bedtime)  ascorbic acid 500 mg oral tablet: 1 tab(s) orally once a day  BACLOFEN 20MG TAB: 1 tab(s) orally every 8 hours  BISACODYL 10MG SUPPOSITORIES: 1 each rectal once a day (at bedtime), As Needed  dronabinol 5 mg oral capsule: 1 cap(s) orally every 12 hours  GABAPENTIN 300MG CAP: 1 cap(s) orally every 8 hours  Multiple Vitamins oral tablet: 1 tab(s) orally once a day  senna oral tablet: 2 tab(s) orally every 24 hours  zinc sulfate 220 mg oral capsule: 1 cap(s) orally once a day

## 2021-12-27 NOTE — PROGRESS NOTE ADULT - PROBLEM SELECTOR PLAN 4
- SW consult  - PT consulted, recommended home w/ 24/7 assist as PT is at baseline functional status  - nutrition consulted, recommended supplementing ensure plant-based 1 can TID, vit C, MV, and zinc  - wound care consulted, appreciate recs

## 2021-12-27 NOTE — DISCHARGE NOTE PROVIDER - NSDCCPCAREPLAN_GEN_ALL_CORE_FT
PRINCIPAL DISCHARGE DIAGNOSIS  Diagnosis: Decubitus ulcer  Assessment and Plan of Treatment: You were found to have pressure injuries on your sacrum, right ankle, and left wrist, which were a result of inadequate repositioning. You were repositioned regularly during this hospital stay to allow these injuries to heal and prevent new ones from forming. Our wound care specialist evaluated your wounds and recommended _____. Our physical therapy team worked with you while you were in the hospital and agreed that your needs would be best met at home with 24/7 assistance.       PRINCIPAL DISCHARGE DIAGNOSIS  Diagnosis: Decubitus ulcer  Assessment and Plan of Treatment: You were found to have pressure injuries on your sacrum, right ankle, and left wrist, which were a result of inadequate repositioning. You were repositioned regularly during this hospital stay to allow these injuries to heal and prevent new ones from  Our wound care specialist evaluated your wounds and recommended _____. Our physical therapy team worked with you while you were in the hospital and agreed that your needs would be best met at home with 24/7 assistance.  Wound care recomendation:  Sacral MASD: Cleanse with NS. apply Triad daily and PRN after every episode of incontinence.  Right lateral malleolus Cleanse with NS. apply Cavilon to periwound. Apply Aquacel Extra to wound bed. Cover with foam. Change every other day.         PRINCIPAL DISCHARGE DIAGNOSIS  Diagnosis: Decubitus ulcer  Assessment and Plan of Treatment: You were found to have pressure injuries on your sacrum, right ankle, and left wrist, which were a result of inadequate repositioning. You were repositioned regularly during this hospital stay to allow these injuries to heal and prevent new ones from forming. Our wound care specialist evaluated your wounds and recommended that you take multivitamins, Zinc supplementation, and vitamin C supplementation. Our physical therapy team worked with you while you were in the hospital and agreed that your needs would be best met at subacute rehab.  :  Wound care recomendation:  Sacral MASD: Cleanse with NS. apply Triad daily and PRN after every episode of incontinence.  Right lateral malleolus Cleanse with NS. apply Cavilon to periwound. Apply Aquacel Extra to wound bed. Cover with foam. Change every other day.        SECONDARY DISCHARGE DIAGNOSES  Diagnosis: Neuropathy  Assessment and Plan of Treatment: Please continue to take your gabapentin, amitryptaline,  and baclofen as prescribed for your neuropathic pain. We have increased your dose of dronabinol to 5 mg twice daily.

## 2021-12-27 NOTE — PROGRESS NOTE ADULT - ATTENDING COMMENTS
A/P:  1. starvation ketosis, resolved  2. pressure ulcers of multiple sites (present on admission): stage I-II on bilateral heels, stage IV of LLE ankle; stage IV sacral ulcer  3. c4 quadriplegia with neuropathy    - labs reviewed, acidosis has resolved  - wound care c/s  - discharge planning to home with 24/7 care

## 2021-12-27 NOTE — DISCHARGE NOTE PROVIDER - CARE PROVIDER_API CALL
Kaley Mcelroy  Phone: [phone]  Fax: (   )    -  Established Patient  Follow Up Time: 2 weeks   Kaley Mcelroy  [address] [place] [address]  Phone: [phone]  Fax: (   )    -  Established Patient  Follow Up Time: 2 weeks   Kaley Mcelroy (NP)  [facility]  INTERNAL MEDICINE  [address], [address] [place] [address]  Phone: [phone]  Fax: (   )    -  Established Patient  Scheduled Appointment: 01/14/2022 04:30 PM

## 2021-12-28 LAB
ANION GAP SERPL CALC-SCNC: 12 MMOL/L — SIGNIFICANT CHANGE UP (ref 5–17)
BASOPHILS # BLD AUTO: 0.03 K/UL — SIGNIFICANT CHANGE UP (ref 0–0.2)
BASOPHILS NFR BLD AUTO: 0.7 % — SIGNIFICANT CHANGE UP (ref 0–2)
BUN SERPL-MCNC: 18 MG/DL — SIGNIFICANT CHANGE UP (ref 7–23)
CALCIUM SERPL-MCNC: 9.7 MG/DL — SIGNIFICANT CHANGE UP (ref 8.4–10.5)
CHLORIDE SERPL-SCNC: 106 MMOL/L — SIGNIFICANT CHANGE UP (ref 96–108)
CO2 SERPL-SCNC: 22 MMOL/L — SIGNIFICANT CHANGE UP (ref 22–31)
CREAT SERPL-MCNC: 0.27 MG/DL — LOW (ref 0.5–1.3)
EOSINOPHIL # BLD AUTO: 0.3 K/UL — SIGNIFICANT CHANGE UP (ref 0–0.5)
EOSINOPHIL NFR BLD AUTO: 7.2 % — HIGH (ref 0–6)
GLUCOSE BLDC GLUCOMTR-MCNC: 105 MG/DL — HIGH (ref 70–99)
GLUCOSE BLDC GLUCOMTR-MCNC: 105 MG/DL — HIGH (ref 70–99)
GLUCOSE BLDC GLUCOMTR-MCNC: 116 MG/DL — HIGH (ref 70–99)
GLUCOSE BLDC GLUCOMTR-MCNC: 93 MG/DL — SIGNIFICANT CHANGE UP (ref 70–99)
GLUCOSE SERPL-MCNC: 89 MG/DL — SIGNIFICANT CHANGE UP (ref 70–99)
HCT VFR BLD CALC: 37.2 % — SIGNIFICANT CHANGE UP (ref 34.5–45)
HGB BLD-MCNC: 11.9 G/DL — SIGNIFICANT CHANGE UP (ref 11.5–15.5)
IMM GRANULOCYTES NFR BLD AUTO: 0.2 % — SIGNIFICANT CHANGE UP (ref 0–1.5)
LYMPHOCYTES # BLD AUTO: 1.55 K/UL — SIGNIFICANT CHANGE UP (ref 1–3.3)
LYMPHOCYTES # BLD AUTO: 37.4 % — SIGNIFICANT CHANGE UP (ref 13–44)
MAGNESIUM SERPL-MCNC: 2.4 MG/DL — SIGNIFICANT CHANGE UP (ref 1.6–2.6)
MCHC RBC-ENTMCNC: 30.7 PG — SIGNIFICANT CHANGE UP (ref 27–34)
MCHC RBC-ENTMCNC: 32 GM/DL — SIGNIFICANT CHANGE UP (ref 32–36)
MCV RBC AUTO: 95.9 FL — SIGNIFICANT CHANGE UP (ref 80–100)
MONOCYTES # BLD AUTO: 0.38 K/UL — SIGNIFICANT CHANGE UP (ref 0–0.9)
MONOCYTES NFR BLD AUTO: 9.2 % — SIGNIFICANT CHANGE UP (ref 2–14)
NEUTROPHILS # BLD AUTO: 1.87 K/UL — SIGNIFICANT CHANGE UP (ref 1.8–7.4)
NEUTROPHILS NFR BLD AUTO: 45.3 % — SIGNIFICANT CHANGE UP (ref 43–77)
NRBC # BLD: 0 /100 WBCS — SIGNIFICANT CHANGE UP (ref 0–0)
PLATELET # BLD AUTO: 262 K/UL — SIGNIFICANT CHANGE UP (ref 150–400)
POTASSIUM SERPL-MCNC: 3.8 MMOL/L — SIGNIFICANT CHANGE UP (ref 3.5–5.3)
POTASSIUM SERPL-SCNC: 3.8 MMOL/L — SIGNIFICANT CHANGE UP (ref 3.5–5.3)
RBC # BLD: 3.88 M/UL — SIGNIFICANT CHANGE UP (ref 3.8–5.2)
RBC # FLD: 15.5 % — HIGH (ref 10.3–14.5)
SODIUM SERPL-SCNC: 140 MMOL/L — SIGNIFICANT CHANGE UP (ref 135–145)
WBC # BLD: 4.14 K/UL — SIGNIFICANT CHANGE UP (ref 3.8–10.5)
WBC # FLD AUTO: 4.14 K/UL — SIGNIFICANT CHANGE UP (ref 3.8–10.5)

## 2021-12-28 PROCEDURE — 99232 SBSQ HOSP IP/OBS MODERATE 35: CPT | Mod: GC

## 2021-12-28 RX ADMIN — Medication 20 MILLIGRAM(S): at 13:20

## 2021-12-28 RX ADMIN — Medication 200 MILLIGRAM(S): at 23:31

## 2021-12-28 RX ADMIN — Medication 200 MILLIGRAM(S): at 09:20

## 2021-12-28 RX ADMIN — GABAPENTIN 300 MILLIGRAM(S): 400 CAPSULE ORAL at 06:31

## 2021-12-28 RX ADMIN — Medication 1 TABLET(S): at 13:19

## 2021-12-28 RX ADMIN — Medication 2.5 MILLIGRAM(S): at 09:21

## 2021-12-28 RX ADMIN — Medication 20 MILLIGRAM(S): at 06:31

## 2021-12-28 RX ADMIN — Medication 2.5 MILLIGRAM(S): at 23:31

## 2021-12-28 RX ADMIN — ENOXAPARIN SODIUM 40 MILLIGRAM(S): 100 INJECTION SUBCUTANEOUS at 23:31

## 2021-12-28 RX ADMIN — GABAPENTIN 300 MILLIGRAM(S): 400 CAPSULE ORAL at 13:20

## 2021-12-28 RX ADMIN — GABAPENTIN 300 MILLIGRAM(S): 400 CAPSULE ORAL at 23:31

## 2021-12-28 RX ADMIN — Medication 25 MILLIGRAM(S): at 23:31

## 2021-12-28 RX ADMIN — Medication 500 MILLIGRAM(S): at 13:23

## 2021-12-28 RX ADMIN — ZINC SULFATE TAB 220 MG (50 MG ZINC EQUIVALENT) 220 MILLIGRAM(S): 220 (50 ZN) TAB at 13:50

## 2021-12-28 NOTE — PROGRESS NOTE ADULT - PROBLEM SELECTOR PLAN 3
STABLE    Eos 7.4% (AbsEo 0.3) on 12/26 AM labs, increased from 2.3% at presentation. Possibly 2/2 medication in ED (ketorolac vs ativan). DDx also includes less likely parasitic vs unlikely neoplastic.   - monitor Eo% and AbsEo  - f/u as outpatient

## 2021-12-28 NOTE — ADVANCED PRACTICE NURSE CONSULT - ASSESSMENT
Patient with intergluteal cleft/bilateral buttock moisture associated skin damage, presenting as solid red, blanching, mirror image lesion.    To right lateral malleolus with a stage-4 with palpable bone and desiccated tendon and 20% slough. wound measures 2.5x2.5x0.2cm with undermining from 7-11o'clock with greatest length at 7o'clock measuring approximately 1cm. drainage is moderate, serosanguineous, and has no odor. periwound with blanchable erythema.     discussed with MD Cardona.

## 2021-12-28 NOTE — PROGRESS NOTE ADULT - ASSESSMENT
51F PMHx remote traumatic C4 quadriplegia (bedbound at baseline), dysreflexia, recently hospitalized for b/l PNA ([facility]) and discharged to rehab on 12/22, ARIANNA from [facility] rehab due to neglect, found to have new pressure injuries and mild starvation ketosis, admitted for nutrition, wound care, and dispo planning.

## 2021-12-28 NOTE — PROGRESS NOTE ADULT - PROBLEM SELECTOR PROBLEM 2
Drop in hemoglobin Pt from [facility] with staff. Pt states he was cutting his LT hand with a sharp object yesterday bc he missed his mother states he wasn't trying to hurt himself, also was banging his head against the wall and c/o a headache. Small scabs to LT hand noted, no bleeding noted. Denies SI/HI. Denies AH, TH, VH. PMH: Bipolar, asthma

## 2021-12-28 NOTE — ADVANCED PRACTICE NURSE CONSULT - RECOMMEDATIONS
Sacral MASD: Cleanse with NS. apply Triad daily and PRN after every episode of incontinence.    Right lateral malleolus Cleanse with NS. apply Cavilon to periwound. Apply Aquacel Extra to wound bed. Cover with foam. Change every other day.

## 2021-12-28 NOTE — PROGRESS NOTE ADULT - PROBLEM SELECTOR PLAN 5
Patient with new decub ulcers which arose at rehab facility due to neglect and bedding inappropriate for her condition, and lack of adequate turning.   - Wound care consulted, appreciate recs   - frequent repositioning and dressing changes per nursing

## 2021-12-28 NOTE — PROGRESS NOTE ADULT - PROBLEM SELECTOR PLAN 2
RESOLVED    Hgb 14 on admission, decreased to 10.7 (MCV 96.9) s/p 1L NS and ~500ccs D5-LR. No s/s of bleeding, pt denies any recent bleed. Possibly hemodilution.   - Hgb stable in 11s   - monitor CBC

## 2021-12-28 NOTE — PROGRESS NOTE ADULT - PROBLEM SELECTOR PLAN 6
H/o neuropathy taking gabapentin, baclofen, and gabapentin at home. Also reports using medical marijuana or dronabinol PRN for pain/anxiety.   - c/w home Gabapentin 300mg PO TID   - c/w home Amitryptiline 25mg PO daily  - c/w home Baclofen 20mg PO TID  - c/w dronabinol 2.5mg PO BID

## 2021-12-28 NOTE — PROGRESS NOTE ADULT - SUBJECTIVE AND OBJECTIVE BOX
INTERVAL HPI/OVERNIGHT EVENTS:  Patient was seen and examined at bedside.   O/n, pt hypotensive to 85/65->improved to 100/69 without intervention, ordered for 500cc LR bolus by NF, but pt refused because she reports low BPs at home normally. This AM, patient resting comfortably. Pt feels tired but no other complaints at this time. Dronabinol working for pt. Still feels constipated and requested additional tap water enema for this AM. Patient denies: fever, chills, dizziness, weakness, HA, changes in vision, CP, palpitations, SOB, cough, N/V/D/C, dysuria, changes in bowel movements, LE edema. ROS otherwise negative.      INTERVAL UPDATE:  Pt's neighbor tested positive for covid, pt placed on isolation precautions for covid exposure (12/28). Per current epi guidelines, will require isolation for 10d (through 1/6/22).      VITAL SIGNS:  T(F): 97.5 (12-28-21 @ 09:25)  HR: 66 (12-28-21 @ 09:25)  BP: 122/80 (12-28-21 @ 09:25)  RR: 18 (12-28-21 @ 09:25)  SpO2: 97% (12-28-21 @ 09:25)  Wt(kg): --    PHYSICAL EXAM:    Constitutional: thin, middle-aged female resting comfortably in bed, NAD  HEENT: PERRL, EOMI grossly, sclera anicteric, neck supple, no JVD, MMM, good dentition  Respiratory: CTA b/l; no wheezing, no rhonchi, no rales; unlabored respirations  Cardiovascular: RRR, normal S1, S2; +holosystolic murmur (pt confirms present for most of her life) no R/G  Gastrointestinal: soft, NT/ND; no masses palpable; BS normoactive x4 quadrants  : urostomy w/ clear yellow output  Extremities: Warm and well perfused; 2+ pulses equal bilateral lower extremities, R radial pulse palpable, L radial pulse 2+; no edema, cyanosis, or clubbing  Neurological: AOx3, CN II-XII grossly intact, marked atrophy of b/l LE>UE, unable to move b/l LE but able to move b/l UE   Skin: dressings covering pressure injuries on L wrist, R ankle, and sacrum    MEDICATIONS  (STANDING):  amitriptyline 25 milliGRAM(s) Oral at bedtime  ascorbic acid 500 milliGRAM(s) Oral daily  baclofen 20 milliGRAM(s) Oral three times a day  bisacodyl Suppository 10 milliGRAM(s) Rectal at bedtime  dronabinol 2.5 milliGRAM(s) Oral every 12 hours  enoxaparin Injectable 40 milliGRAM(s) SubCutaneous at bedtime  gabapentin 300 milliGRAM(s) Oral three times a day  ibuprofen  Tablet. 200 milliGRAM(s) Oral every 12 hours  influenza   Vaccine 0.5 milliLiter(s) IntraMuscular once  multivitamin 1 Tablet(s) Oral daily  senna 2 Tablet(s) Oral at bedtime  zinc sulfate 220 milliGRAM(s) Oral daily    MEDICATIONS  (PRN):      Allergies    No Known Allergies    Intolerances        LABS:                        11.9   4.14  )-----------( 262      ( 28 Dec 2021 05:47 )             37.2     12-28    140  |  106  |  18  ----------------------------<  89  3.8   |  22  |  0.27<L>    Ca    9.7      28 Dec 2021 05:47  Mg     2.4     12-28          CAPILLARY BLOOD GLUCOSE      POCT Blood Glucose.: 116 mg/dL (28 Dec 2021 12:04)  POCT Blood Glucose.: 93 mg/dL (28 Dec 2021 08:18)  POCT Blood Glucose.: 105 mg/dL (28 Dec 2021 02:26)

## 2021-12-28 NOTE — ADVANCED PRACTICE NURSE CONSULT - REASON FOR CONSULT
multiple pressure injuries, present on arrival    INTERVAL HPI/OVERNIGHT EVENTS:  Patient was seen and examined at bedside.   O/n, pt hypotensive to 85/65->improved to 100/69 without intervention, ordered for 500cc LR bolus by NF, but pt refused because she reports low BPs at home normally. This AM, patient resting comfortably. Pt feels tired but no other complaints at this time. Dronabinol working for pt. Still feels constipated and requested additional tap water enema for this AM. Patient denies: fever, chills, dizziness, weakness, HA, changes in vision, CP, palpitations, SOB, cough, N/V/D/C, dysuria, changes in bowel movements, LE edema. ROS otherwise negative.      INTERVAL UPDATE:  Pt's neighbor tested positive for covid, pt placed on isolation precautions for covid exposure (12/28). Per current epi guidelines, will require isolation for 10d (through 1/6/22).

## 2021-12-28 NOTE — PROGRESS NOTE ADULT - PROBLEM SELECTOR PLAN 8
F: s/p 1L NS bolus, s/p D5/LR 1L  E: Replete electrolytes as needed, maintain K>4, M>2  N: regular diet w/ ensure plant-based 1 can TID    DVT ppx: lovenox 40 q24h  GI ppx: Senna, dulcolax suppository    Code status: FULL CODE    Dispo: home w/ 24/7 assist vs [facility]

## 2021-12-28 NOTE — PROGRESS NOTE ADULT - ATTENDING COMMENTS
wound care reccs appreciated  pending discharge to [facility] vs. home with 24/7 care

## 2021-12-29 DIAGNOSIS — Z20.822 CONTACT WITH AND (SUSPECTED) EXPOSURE TO COVID-19: ICD-10-CM

## 2021-12-29 LAB — SARS-COV-2 RNA SPEC QL NAA+PROBE: NEGATIVE — SIGNIFICANT CHANGE UP

## 2021-12-29 PROCEDURE — 99232 SBSQ HOSP IP/OBS MODERATE 35: CPT | Mod: GC

## 2021-12-29 RX ORDER — SENNA PLUS 8.6 MG/1
1 TABLET ORAL ONCE
Refills: 0 | Status: COMPLETED | OUTPATIENT
Start: 2021-12-29 | End: 2021-12-29

## 2021-12-29 RX ADMIN — Medication 1 TABLET(S): at 12:27

## 2021-12-29 RX ADMIN — ZINC SULFATE TAB 220 MG (50 MG ZINC EQUIVALENT) 220 MILLIGRAM(S): 220 (50 ZN) TAB at 12:26

## 2021-12-29 RX ADMIN — Medication 2.5 MILLIGRAM(S): at 10:09

## 2021-12-29 RX ADMIN — GABAPENTIN 300 MILLIGRAM(S): 400 CAPSULE ORAL at 07:13

## 2021-12-29 RX ADMIN — Medication 200 MILLIGRAM(S): at 00:25

## 2021-12-29 RX ADMIN — Medication 200 MILLIGRAM(S): at 23:00

## 2021-12-29 RX ADMIN — Medication 25 MILLIGRAM(S): at 22:15

## 2021-12-29 RX ADMIN — GABAPENTIN 300 MILLIGRAM(S): 400 CAPSULE ORAL at 22:14

## 2021-12-29 RX ADMIN — Medication 20 MILLIGRAM(S): at 07:13

## 2021-12-29 RX ADMIN — Medication 500 MILLIGRAM(S): at 12:27

## 2021-12-29 RX ADMIN — SENNA PLUS 1 TABLET(S): 8.6 TABLET ORAL at 22:55

## 2021-12-29 RX ADMIN — Medication 20 MILLIGRAM(S): at 22:14

## 2021-12-29 RX ADMIN — Medication 2.5 MILLIGRAM(S): at 22:14

## 2021-12-29 RX ADMIN — Medication 200 MILLIGRAM(S): at 11:00

## 2021-12-29 RX ADMIN — Medication 200 MILLIGRAM(S): at 22:15

## 2021-12-29 RX ADMIN — Medication 20 MILLIGRAM(S): at 00:25

## 2021-12-29 RX ADMIN — GABAPENTIN 300 MILLIGRAM(S): 400 CAPSULE ORAL at 12:26

## 2021-12-29 RX ADMIN — Medication 200 MILLIGRAM(S): at 10:08

## 2021-12-29 RX ADMIN — ENOXAPARIN SODIUM 40 MILLIGRAM(S): 100 INJECTION SUBCUTANEOUS at 22:14

## 2021-12-29 RX ADMIN — Medication 20 MILLIGRAM(S): at 12:27

## 2021-12-29 NOTE — PROGRESS NOTE ADULT - PROBLEM SELECTOR PLAN 9
F: s/p 1L NS bolus, s/p D5/LR 1L  E: Replete electrolytes as needed, maintain K>4, M>2  N: regular diet w/ ensure plant-based 1 can TID    DVT ppx: lovenox 40 q24h  GI ppx: None    Code status: FULL CODE    Dispo: [facility] vs home 24/7 [facility] depending on COVID PCR

## 2021-12-29 NOTE — PROGRESS NOTE ADULT - PROBLEM SELECTOR PLAN 6
H/o neuropathy taking gabapentin, baclofen, and gabapentin at home. Also reports using medical marijuana or dronabinol PRN for pain/anxiety.   - c/w home Gabapentin 300mg PO TID   - c/w home Amitryptiline 25mg PO daily  - c/w home Baclofen 20mg PO TID  - c/w dronabinol 2.5mg PO BID Patient with new decub ulcers which arose at rehab facility due to neglect and bedding inappropriate for her condition, and lack of adequate turning.   - Wound care consulted, appreciate recs   - frequent repositioning and dressing changes per nursing

## 2021-12-29 NOTE — PROGRESS NOTE ADULT - PROBLEM SELECTOR PLAN 4
- SW consult  - PT consulted, recommended home w/ 24/7 assist as PT is at baseline functional status  - nutrition consulted, recommended supplementing ensure plant-based 1 can TID, vit C, MV, and zinc  - wound care consulted, appreciate recs STABLE    Eos 7.4% (AbsEo 0.3) on 12/26 AM labs, increased from 2.3% at presentation. Possibly 2/2 medication in ED (ketorolac vs ativan). DDx also includes less likely parasitic vs unlikely neoplastic.   - monitor Eo% and AbsEo  - f/u as outpatient

## 2021-12-29 NOTE — PROGRESS NOTE ADULT - PROBLEM SELECTOR PLAN 3
STABLE    Eos 7.4% (AbsEo 0.3) on 12/26 AM labs, increased from 2.3% at presentation. Possibly 2/2 medication in ED (ketorolac vs ativan). DDx also includes less likely parasitic vs unlikely neoplastic.   - monitor Eo% and AbsEo  - f/u as outpatient RESOLVED    Hgb 14 on admission, decreased to 10.7 (MCV 96.9) s/p 1L NS and ~500ccs D5-LR. No s/s of bleeding, pt denies any recent bleed. Possibly hemodilution.   - Hgb stable in 11s   - monitor CBC

## 2021-12-29 NOTE — PROGRESS NOTE ADULT - ASSESSMENT
51F PMHx remote traumatic C4 quadriplegia (bedbound at baseline), dysreflexia, recently hospitalized for b/l PNA ([facility]) and discharged to rehab on 12/22, ARIANNA from [facility] rehab due to neglect, found to have new pressure injuries and mild starvation ketosis, admitted for nutrition, wound care, and dispo planning.

## 2021-12-29 NOTE — PROGRESS NOTE ADULT - SUBJECTIVE AND OBJECTIVE BOX
OVERNIGHT EVENTS: Pt w/ 5 BMs, formed overnight. Dc'ed senna and dulcolax suppository.    SUBJECTIVE / INTERVAL HPI: Patient seen and examined at bedside. Denies any cough, sore throat, fever, or shortness of breath. No complaints other than asking for help with repositioning.     VITAL SIGNS:  Vital Signs Last 24 Hrs  T(C): 36.3 (29 Dec 2021 10:25), Max: 36.4 (28 Dec 2021 16:23)  T(F): 97.3 (29 Dec 2021 10:25), Max: 97.6 (28 Dec 2021 16:23)  HR: 62 (29 Dec 2021 10:25) (61 - 74)  BP: 97/68 (29 Dec 2021 10:25) (97/68 - 112/77)  BP(mean): --  RR: 16 (29 Dec 2021 10:25) (16 - 18)  SpO2: 99% (29 Dec 2021 10:25) (97% - 100%)    PHYSICAL EXAM:  Constitutional: thin, middle-aged female resting comfortably in bed, NAD  HEENT: PERRL, EOMI grossly, sclera anicteric, neck supple, no JVD, MMM, good dentition  Respiratory: CTA b/l; no wheezing, no rhonchi, no rales; unlabored respirations  Cardiovascular: RRR, normal S1, S2; +holosystolic murmur (pt confirms present for most of her life) no R/G  Gastrointestinal: soft, NT/ND; no masses palpable; BS normoactive x4 quadrants  : urostomy w/ clear yellow output  Extremities: Warm and well perfused; 2+ pulses equal bilateral lower extremities, R radial pulse palpable, L radial pulse 2+; no edema, cyanosis, or clubbing  Neurological: AOx3, CN II-XII grossly intact, marked atrophy of b/l LE>UE, unable to move b/l LE but able to move b/l UE   Skin: dressings covering pressure injuries on L wrist, R ankle, and sacrum    MEDICATIONS:  MEDICATIONS  (STANDING):  amitriptyline 25 milliGRAM(s) Oral at bedtime  ascorbic acid 500 milliGRAM(s) Oral daily  baclofen 20 milliGRAM(s) Oral three times a day  dronabinol 2.5 milliGRAM(s) Oral every 12 hours  enoxaparin Injectable 40 milliGRAM(s) SubCutaneous at bedtime  gabapentin 300 milliGRAM(s) Oral three times a day  ibuprofen  Tablet. 200 milliGRAM(s) Oral every 12 hours  influenza   Vaccine 0.5 milliLiter(s) IntraMuscular once  multivitamin 1 Tablet(s) Oral daily  zinc sulfate 220 milliGRAM(s) Oral daily    MEDICATIONS  (PRN):      ALLERGIES:  Allergies    No Known Allergies    Intolerances        LABS:                        11.9   4.14  )-----------( 262      ( 28 Dec 2021 05:47 )             37.2     12-28    140  |  106  |  18  ----------------------------<  89  3.8   |  22  |  0.27<L>    Ca    9.7      28 Dec 2021 05:47  Mg     2.4     12-28          CAPILLARY BLOOD GLUCOSE      POCT Blood Glucose.: 105 mg/dL (28 Dec 2021 22:56)      RADIOLOGY & ADDITIONAL TESTS: Reviewed.

## 2021-12-29 NOTE — PROGRESS NOTE ADULT - PROBLEM SELECTOR PLAN 8
F: s/p 1L NS bolus, s/p D5/LR 1L  E: Replete electrolytes as needed, maintain K>4, M>2  N: regular diet w/ ensure plant-based 1 can TID    DVT ppx: lovenox 40 q24h  GI ppx: None    Code status: FULL CODE    Dispo: [facility] - treatment of neuropathy as above  - SW consult for neglect as above  - PT, nutrition consults as above

## 2021-12-29 NOTE — CHART NOTE - NSCHARTNOTEFT_GEN_A_CORE
Admitting Diagnosis:   Patient is a 51y old  Female who presents with a chief complaint of rehab placement (29 Dec 2021 08:18)    PAST MEDICAL & SURGICAL HISTORY:  Quadriplegia    Current Nutrition Order:  Diet, Regular:   Supplement Feeding Modality:  Oral  Ensure Plant-Based Cans or Servings Per Day:  1       Frequency:  Three Times a day (12-26-21 @ 13:01)    PO Intake: Good (%) [   ]  Fair (>/=50%) [ x  ] Poor (<25%) [   ]  -Spoke with RN, states pt typically does not eat much for breakfast however lunch and dinner eats majority of meal. Likes the Ensure Plant and drinking 2-3 per day which provides 180 kcals and 20g protein each. Ate 1 hard boiled egg and drank 100% of Ensure plant for breakfast this morning (12/29) per RN. Pt is total feed.     GI Issues: +8 solid BM 12/28. Now d/martina bowel regimen per RN  Denies N/V   Pain: 3/3 generalized pain  Skin Integrity: Stage 3 pressure injury- sacrum   Stage 4 pressure injury- Rt ankle   Stage 1 pressure injury- Lt and Rt heel   Jose A score: 11    Labs: creatinine 0.27, POCT glucose x24hrs:   12-28    140  |  106  |  18  ----------------------------<  89  3.8   |  22  |  0.27<L>    Ca    9.7      28 Dec 2021 05:47  Mg     2.4     12-28    POCT Blood Glucose.: 105 mg/dL (28 Dec 2021 22:56)    Medications:  MEDICATIONS  (STANDING):  amitriptyline 25 milliGRAM(s) Oral at bedtime  ascorbic acid 500 milliGRAM(s) Oral daily  baclofen 20 milliGRAM(s) Oral three times a day  dronabinol 2.5 milliGRAM(s) Oral every 12 hours  enoxaparin Injectable 40 milliGRAM(s) SubCutaneous at bedtime  gabapentin 300 milliGRAM(s) Oral three times a day  ibuprofen  Tablet. 200 milliGRAM(s) Oral every 12 hours  influenza   Vaccine 0.5 milliLiter(s) IntraMuscular once  multivitamin 1 Tablet(s) Oral daily  zinc sulfate 220 milliGRAM(s) Oral daily    Anthropometrics:  Ht: 170.2cm (67")  Wt: 47.6kg (12/25)- Admit    IBW:  135# (61.4kg)   % IBW: 77%     Weight Change: No new weight since admission. Please weigh biweekly     Malnutrition: Severe malnutrition in the context of acute illness/ environment, Diagnosed 12/ 26     Estimated energy needs: 47.6kg  ABW used for calculations as pt between % of IBW (86%). Nutrient needs based on [facility] standards of care for maintenance in older adults. IBW and needs adjusted for quadriplegia, decubitus pressure ulcer, suspected severe PCM  Calories: 1450-1650kcals (30-35kcals/kg)  Protein: 65-75g (1.4-1.6g/kg)  Fluid: 1650-1900ml (35-40ml/kg)    Subjective:   51y yo Female  with PMH of bedbound, remote traumatic c4 quadriplegia, dysreflexia, recently hospitalized for b/l pna ([facility]) discharged to rehab on 12/22, ARIANNA from [facility] rehab due to neglect. Pt presented with metabolic acidosis, decubitus ulcer, and neuropathy. Also noted to be quadriplegia. Started on Marinol 12/27 which is improving appetite/ po intake. RN feeding pt d/t total assist. Wound care follow for management of pressure injuries. SW follow, now pending d/c to [facility] acceptance and insurance auth. Medically stable for d/c.     Previous Nutrition Diagnosis: Malnutrition R/T lack of support from living environment AEB inadequate energy and protein intake for at least 1 week and signs of muscle wasting    Active [ x  ] -improving (12/29)  Resolved [   ]    If resolved, new PES:     Goal: Pt to meet >75% of nutrient needs via po intake     Recommendations:  1. Continue regular diet + Ensure Plant TID  -Provide feeding assistance at meals/ snacks   2. Bowel regimen PRN  3. Monitor labs  4. Continue appetite stimulant (marinol)     Education: Po intake improving     Risk Level: High [ x  ] Moderate [   ] Low [   ]  Bhavani Mckeon RD,CDN,,CNSC

## 2021-12-29 NOTE — PROGRESS NOTE ADULT - PROBLEM SELECTOR PLAN 7
- treatment of neuropathy as above  - SW consult for neglect as above  - PT, nutrition consults as above H/o neuropathy taking gabapentin, baclofen, and gabapentin at home. Also reports using medical marijuana or dronabinol PRN for pain/anxiety.   - c/w home Gabapentin 300mg PO TID   - c/w home Amitryptiline 25mg PO daily  - c/w home Baclofen 20mg PO TID  - c/w dronabinol 2.5mg PO BID

## 2021-12-29 NOTE — PROGRESS NOTE ADULT - PROBLEM SELECTOR PLAN 1
RESOLVED    Patient presenting with Anion Gap 18, CO2 20 on BMP->closed to 10. BHB 2.3->0.7, lactate 0.5. Fatigued on presentation.  Concern for poor nutrition over past 2-3 days. Patient reports not taking PO since being discharged to rehab 12/22 as the facility was not assisting her with feeds. While she reports no dysphagia, she does not have adequate use of her hands to be able to feed herself.   Likely mild fasting ketosis.   - s/p 1LNS bolus and 1L D5-LR   - nutrition consult  - nursing instructed to provide feeding assistance Exposed to COVID on 12/28. Asymptomatic.  - Will require 2 COVID PCRs prior to dispo decision, first on 12/29  - If positive, will go to [facility]; otherwise home w/ 24/7 [facility]

## 2021-12-29 NOTE — PROGRESS NOTE ADULT - PROBLEM SELECTOR PLAN 5
Patient with new decub ulcers which arose at rehab facility due to neglect and bedding inappropriate for her condition, and lack of adequate turning.   - Wound care consulted, appreciate recs   - frequent repositioning and dressing changes per nursing - SW consult  - PT consulted, recommended home w/ 24/7 assist as PT is at baseline functional status  - nutrition consulted, recommended supplementing ensure plant-based 1 can TID, vit C, MV, and zinc  - wound care consulted, appreciate recs

## 2021-12-30 LAB
ANION GAP SERPL CALC-SCNC: 8 MMOL/L — SIGNIFICANT CHANGE UP (ref 5–17)
BUN SERPL-MCNC: 13 MG/DL — SIGNIFICANT CHANGE UP (ref 7–23)
CALCIUM SERPL-MCNC: 9.1 MG/DL — SIGNIFICANT CHANGE UP (ref 8.4–10.5)
CHLORIDE SERPL-SCNC: 107 MMOL/L — SIGNIFICANT CHANGE UP (ref 96–108)
CO2 SERPL-SCNC: 23 MMOL/L — SIGNIFICANT CHANGE UP (ref 22–31)
CREAT SERPL-MCNC: 0.24 MG/DL — LOW (ref 0.5–1.3)
GLUCOSE SERPL-MCNC: 82 MG/DL — SIGNIFICANT CHANGE UP (ref 70–99)
HCT VFR BLD CALC: 36.7 % — SIGNIFICANT CHANGE UP (ref 34.5–45)
HGB BLD-MCNC: 11.5 G/DL — SIGNIFICANT CHANGE UP (ref 11.5–15.5)
MAGNESIUM SERPL-MCNC: 2.2 MG/DL — SIGNIFICANT CHANGE UP (ref 1.6–2.6)
MCHC RBC-ENTMCNC: 30.5 PG — SIGNIFICANT CHANGE UP (ref 27–34)
MCHC RBC-ENTMCNC: 31.3 GM/DL — LOW (ref 32–36)
MCV RBC AUTO: 97.3 FL — SIGNIFICANT CHANGE UP (ref 80–100)
NRBC # BLD: 0 /100 WBCS — SIGNIFICANT CHANGE UP (ref 0–0)
PHOSPHATE SERPL-MCNC: 4.6 MG/DL — HIGH (ref 2.5–4.5)
PLATELET # BLD AUTO: 231 K/UL — SIGNIFICANT CHANGE UP (ref 150–400)
POTASSIUM SERPL-MCNC: 4.1 MMOL/L — SIGNIFICANT CHANGE UP (ref 3.5–5.3)
POTASSIUM SERPL-SCNC: 4.1 MMOL/L — SIGNIFICANT CHANGE UP (ref 3.5–5.3)
RBC # BLD: 3.77 M/UL — LOW (ref 3.8–5.2)
RBC # FLD: 15.4 % — HIGH (ref 10.3–14.5)
SODIUM SERPL-SCNC: 138 MMOL/L — SIGNIFICANT CHANGE UP (ref 135–145)
WBC # BLD: 3.11 K/UL — LOW (ref 3.8–10.5)
WBC # FLD AUTO: 3.11 K/UL — LOW (ref 3.8–10.5)

## 2021-12-30 PROCEDURE — 99232 SBSQ HOSP IP/OBS MODERATE 35: CPT | Mod: GC

## 2021-12-30 RX ADMIN — Medication 25 MILLIGRAM(S): at 22:00

## 2021-12-30 RX ADMIN — Medication 500 MILLIGRAM(S): at 12:17

## 2021-12-30 RX ADMIN — Medication 200 MILLIGRAM(S): at 09:45

## 2021-12-30 RX ADMIN — ENOXAPARIN SODIUM 40 MILLIGRAM(S): 100 INJECTION SUBCUTANEOUS at 21:59

## 2021-12-30 RX ADMIN — GABAPENTIN 300 MILLIGRAM(S): 400 CAPSULE ORAL at 14:03

## 2021-12-30 RX ADMIN — Medication 2.5 MILLIGRAM(S): at 09:01

## 2021-12-30 RX ADMIN — Medication 1 TABLET(S): at 12:17

## 2021-12-30 RX ADMIN — Medication 20 MILLIGRAM(S): at 14:03

## 2021-12-30 RX ADMIN — GABAPENTIN 300 MILLIGRAM(S): 400 CAPSULE ORAL at 21:59

## 2021-12-30 RX ADMIN — Medication 2.5 MILLIGRAM(S): at 22:00

## 2021-12-30 RX ADMIN — Medication 20 MILLIGRAM(S): at 22:00

## 2021-12-30 RX ADMIN — Medication 200 MILLIGRAM(S): at 22:30

## 2021-12-30 RX ADMIN — Medication 20 MILLIGRAM(S): at 05:55

## 2021-12-30 RX ADMIN — Medication 200 MILLIGRAM(S): at 09:01

## 2021-12-30 RX ADMIN — ZINC SULFATE TAB 220 MG (50 MG ZINC EQUIVALENT) 220 MILLIGRAM(S): 220 (50 ZN) TAB at 12:17

## 2021-12-30 RX ADMIN — GABAPENTIN 300 MILLIGRAM(S): 400 CAPSULE ORAL at 05:55

## 2021-12-30 RX ADMIN — Medication 200 MILLIGRAM(S): at 22:00

## 2021-12-30 NOTE — PROGRESS NOTE ADULT - PROBLEM SELECTOR PLAN 9
F: s/p 1L NS bolus, s/p D5/LR 1L  E: Replete electrolytes as needed, maintain K>4, M>2  N: regular diet w/ ensure plant-based 1 can TID    DVT ppx: lovenox 40 q24h  GI ppx: None    Code status: FULL CODE    Dispo: [facility] vs home 24/7 [facility] depending on COVID PCR F: s/p 1L NS bolus, s/p D5/LR 1L  E: Replete electrolytes as needed, maintain K>4, M>2  N: regular diet w/ ensure plant-based 1 can TID    DVT ppx: lovenox 40 q24h  GI ppx: None    Code status: FULL CODE    Dispo: [facility]

## 2021-12-30 NOTE — PROGRESS NOTE ADULT - ASSESSMENT
51F PMHx remote traumatic C4 quadriplegia (bedbound at baseline), dysreflexia, recently hospitalized for b/l PNA ([facility]) and discharged to rehab on 12/22, ARIANNA from [facility] rehab due to neglect, found to have new pressure injuries and mild starvation ketosis, admitted for nutrition, wound care, and dispo planning.

## 2021-12-30 NOTE — PROGRESS NOTE ADULT - SUBJECTIVE AND OBJECTIVE BOX
OVERNIGHT EVENTS: Given senna for subjective complaint of constipation.    SUBJECTIVE / INTERVAL HPI: Patient seen and examined at bedside. Pt has no new complaints other than feeling constipated. Pt denies any fever, chills, headache, shortness of breath, nausea, vomiting, diarrhea.     VITAL SIGNS:  Vital Signs Last 24 Hrs  T(C): 36.7 (30 Dec 2021 10:23), Max: 36.9 (29 Dec 2021 21:16)  T(F): 98.1 (30 Dec 2021 10:23), Max: 98.4 (29 Dec 2021 21:16)  HR: 63 (30 Dec 2021 10:23) (58 - 66)  BP: 100/69 (30 Dec 2021 10:23) (100/69 - 116/82)  BP(mean): --  RR: 18 (30 Dec 2021 10:23) (18 - 18)  SpO2: 99% (30 Dec 2021 10:23) (95% - 99%)    PHYSICAL EXAM:  Constitutional: thin, middle-aged female resting comfortably in bed, NAD  HEENT: PERRL, EOMI grossly, sclera anicteric, neck supple, no JVD, MMM, good dentition  Respiratory: CTA b/l; no wheezing, no rhonchi, no rales; unlabored respirations  Cardiovascular: RRR, normal S1, S2; +holosystolic murmur (pt confirms present for most of her life) no R/G  Gastrointestinal: soft, NT/ND; no masses palpable; BS normoactive x4 quadrants  : urostomy w/ clear yellow output  Extremities: Warm and well perfused; 2+ pulses equal bilateral lower extremities, R radial pulse palpable, L radial pulse 2+; no edema, cyanosis, or clubbing  Neurological: AOx3, CN II-XII grossly intact, marked atrophy of b/l LE>UE, unable to move b/l LE but able to move b/l UE   Skin: dressings covering pressure injuries on L wrist, R ankle, and sacrum    MEDICATIONS:  MEDICATIONS  (STANDING):  amitriptyline 25 milliGRAM(s) Oral at bedtime  ascorbic acid 500 milliGRAM(s) Oral daily  baclofen 20 milliGRAM(s) Oral three times a day  dronabinol 2.5 milliGRAM(s) Oral every 12 hours  enoxaparin Injectable 40 milliGRAM(s) SubCutaneous at bedtime  gabapentin 300 milliGRAM(s) Oral three times a day  ibuprofen  Tablet. 200 milliGRAM(s) Oral every 12 hours  influenza   Vaccine 0.5 milliLiter(s) IntraMuscular once  multivitamin 1 Tablet(s) Oral daily  zinc sulfate 220 milliGRAM(s) Oral daily    MEDICATIONS  (PRN):      ALLERGIES:  Allergies    No Known Allergies    Intolerances        LABS:                        11.5   3.11  )-----------( 231      ( 30 Dec 2021 09:02 )             36.7     12-30    138  |  107  |  13  ----------------------------<  82  4.1   |  23  |  0.24<L>    Ca    9.1      30 Dec 2021 08:59  Phos  4.6     12-30  Mg     2.2     12-30          CAPILLARY BLOOD GLUCOSE      POCT Blood Glucose.: 105 mg/dL (28 Dec 2021 22:56)      RADIOLOGY & ADDITIONAL TESTS: Reviewed.

## 2021-12-30 NOTE — PROGRESS NOTE ADULT - PROBLEM SELECTOR PLAN 6
Patient called and stated that she was called from [facility] stating that they do not have her medications in stock. She is confused as to why they were even called in because she doesn't use that pharmacy. She would like everything moved to [facility].    [id number] - [place], [place] - [address] AT [facility] [address]    Patient with new decub ulcers which arose at rehab facility due to neglect and bedding inappropriate for her condition, and lack of adequate turning.   - Wound care consulted, appreciate recs   - frequent repositioning and dressing changes per nursing

## 2021-12-31 PROCEDURE — 99232 SBSQ HOSP IP/OBS MODERATE 35: CPT | Mod: GC

## 2021-12-31 RX ADMIN — Medication 200 MILLIGRAM(S): at 21:16

## 2021-12-31 RX ADMIN — Medication 200 MILLIGRAM(S): at 21:13

## 2021-12-31 RX ADMIN — Medication 2.5 MILLIGRAM(S): at 09:51

## 2021-12-31 RX ADMIN — ENOXAPARIN SODIUM 40 MILLIGRAM(S): 100 INJECTION SUBCUTANEOUS at 21:14

## 2021-12-31 RX ADMIN — Medication 20 MILLIGRAM(S): at 21:14

## 2021-12-31 RX ADMIN — Medication 25 MILLIGRAM(S): at 21:14

## 2021-12-31 RX ADMIN — Medication 20 MILLIGRAM(S): at 13:37

## 2021-12-31 RX ADMIN — Medication 1 TABLET(S): at 12:02

## 2021-12-31 RX ADMIN — GABAPENTIN 300 MILLIGRAM(S): 400 CAPSULE ORAL at 06:02

## 2021-12-31 RX ADMIN — Medication 200 MILLIGRAM(S): at 09:51

## 2021-12-31 RX ADMIN — Medication 200 MILLIGRAM(S): at 11:00

## 2021-12-31 RX ADMIN — Medication 10 MILLIGRAM(S): at 23:38

## 2021-12-31 RX ADMIN — Medication 500 MILLIGRAM(S): at 12:02

## 2021-12-31 RX ADMIN — Medication 20 MILLIGRAM(S): at 06:02

## 2021-12-31 RX ADMIN — Medication 2.5 MILLIGRAM(S): at 21:14

## 2021-12-31 RX ADMIN — GABAPENTIN 300 MILLIGRAM(S): 400 CAPSULE ORAL at 13:37

## 2021-12-31 RX ADMIN — GABAPENTIN 300 MILLIGRAM(S): 400 CAPSULE ORAL at 21:13

## 2021-12-31 RX ADMIN — ZINC SULFATE TAB 220 MG (50 MG ZINC EQUIVALENT) 220 MILLIGRAM(S): 220 (50 ZN) TAB at 12:02

## 2021-12-31 NOTE — PROGRESS NOTE ADULT - SUBJECTIVE AND OBJECTIVE BOX
OVERNIGHT EVENTS: MARGRET     SUBJECTIVE / INTERVAL HPI: Patient seen and examined at bedside. She has no complaint     VITAL SIGNS:  Vital Signs Last 24 Hrs  T(C): 36.2 (31 Dec 2021 09:00), Max: 36.9 (30 Dec 2021 16:47)  T(F): 97.2 (31 Dec 2021 09:00), Max: 98.4 (30 Dec 2021 16:47)  HR: 64 (31 Dec 2021 12:31) (60 - 64)  BP: 94/64 (31 Dec 2021 12:31) (91/60 - 120/80)  BP(mean): --  RR: 18 (31 Dec 2021 09:00) (18 - 18)  SpO2: 97% (31 Dec 2021 09:00) (97% - 100%)    PHYSICAL EXAM:  Constitutional: thin, middle-aged female resting comfortably in bed, NAD  HEENT: PERRL, EOMI grossly, sclera anicteric, neck supple, no JVD, MMM, good dentition  Respiratory: CTA b/l; no wheezing, no rhonchi, no rales; unlabored respirations  Cardiovascular: RRR, normal S1, S2; +holosystolic murmur (pt confirms present for most of her life) no R/G  Gastrointestinal: soft, NT/ND; no masses palpable; BS normoactive x4 quadrants  : urostomy w/ clear yellow output  Extremities: Warm and well perfused; 2+ pulses equal bilateral lower extremities, R radial pulse palpable, L radial pulse 2+; no edema, cyanosis, or clubbing  Neurological: AOx3, CN II-XII grossly intact, marked atrophy of b/l LE>UE, unable to move b/l LE but able to move b/l UE   Skin: dressings covering pressure injuries on L wrist, R ankle, and sacrum    MEDICATIONS:  MEDICATIONS  (STANDING):  amitriptyline 25 milliGRAM(s) Oral at bedtime  ascorbic acid 500 milliGRAM(s) Oral daily  baclofen 20 milliGRAM(s) Oral three times a day  dronabinol 2.5 milliGRAM(s) Oral every 12 hours  enoxaparin Injectable 40 milliGRAM(s) SubCutaneous at bedtime  gabapentin 300 milliGRAM(s) Oral three times a day  ibuprofen  Tablet. 200 milliGRAM(s) Oral every 12 hours  influenza   Vaccine 0.5 milliLiter(s) IntraMuscular once  multivitamin 1 Tablet(s) Oral daily  zinc sulfate 220 milliGRAM(s) Oral daily    MEDICATIONS  (PRN):      ALLERGIES:  Allergies    No Known Allergies    Intolerances        LABS:                        11.5   3.11  )-----------( 231      ( 30 Dec 2021 09:02 )             36.7     12-30    138  |  107  |  13  ----------------------------<  82  4.1   |  23  |  0.24<L>    Ca    9.1      30 Dec 2021 08:59  Phos  4.6     12-30  Mg     2.2     12-30          CAPILLARY BLOOD GLUCOSE          RADIOLOGY & ADDITIONAL TESTS: Reviewed.    PLAN:

## 2021-12-31 NOTE — PROGRESS NOTE ADULT - PROBLEM SELECTOR PLAN 9
F: s/p 1L NS bolus, s/p D5/LR 1L  E: Replete electrolytes as needed, maintain K>4, M>2  N: regular diet w/ ensure plant-based 1 can TID    DVT ppx: lovenox 40 q24h  GI ppx: None    Code status: FULL CODE    Dispo: [facility]

## 2021-12-31 NOTE — PROGRESS NOTE ADULT - ASSESSMENT
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 rehab due to neglect, found to have new pressure injuries and mild starvation ketosis, admitted for nutrition, wound care, and dispo planning.

## 2021-12-31 NOTE — PROGRESS NOTE ADULT - ATTENDING COMMENTS
Patient was seen and examined with the resident team today.  I agree with Dr. Quintero's assessment and plan with the following exceptions/additions:     Briefly, this is a 50yo woman with a PMH of traumatic C4 quadriplegia, dysreflexia and recent admission for multifocal PNA ([facility]) who presented from [facility] w/new pressure injuries and mild starvation ketosis, admitted for nutrition, wound care, and dispo planning.  Exposed to COVID but medically cleared for [facility].  Remains cleared for discharge.     Rosangela Rivera  [phone]

## 2022-01-01 LAB — GLUCOSE BLDC GLUCOMTR-MCNC: 89 MG/DL — SIGNIFICANT CHANGE UP (ref 70–99)

## 2022-01-01 PROCEDURE — 99232 SBSQ HOSP IP/OBS MODERATE 35: CPT | Mod: GC

## 2022-01-01 RX ADMIN — Medication 1 TABLET(S): at 13:00

## 2022-01-01 RX ADMIN — Medication 200 MILLIGRAM(S): at 14:00

## 2022-01-01 RX ADMIN — GABAPENTIN 300 MILLIGRAM(S): 400 CAPSULE ORAL at 22:21

## 2022-01-01 RX ADMIN — GABAPENTIN 300 MILLIGRAM(S): 400 CAPSULE ORAL at 05:56

## 2022-01-01 RX ADMIN — Medication 25 MILLIGRAM(S): at 22:20

## 2022-01-01 RX ADMIN — Medication 500 MILLIGRAM(S): at 13:00

## 2022-01-01 RX ADMIN — Medication 20 MILLIGRAM(S): at 22:21

## 2022-01-01 RX ADMIN — ZINC SULFATE TAB 220 MG (50 MG ZINC EQUIVALENT) 220 MILLIGRAM(S): 220 (50 ZN) TAB at 13:00

## 2022-01-01 RX ADMIN — Medication 20 MILLIGRAM(S): at 14:36

## 2022-01-01 RX ADMIN — Medication 20 MILLIGRAM(S): at 05:56

## 2022-01-01 RX ADMIN — Medication 200 MILLIGRAM(S): at 13:00

## 2022-01-01 RX ADMIN — ENOXAPARIN SODIUM 40 MILLIGRAM(S): 100 INJECTION SUBCUTANEOUS at 22:20

## 2022-01-01 RX ADMIN — Medication 2.5 MILLIGRAM(S): at 22:20

## 2022-01-01 RX ADMIN — GABAPENTIN 300 MILLIGRAM(S): 400 CAPSULE ORAL at 14:36

## 2022-01-01 RX ADMIN — Medication 2.5 MILLIGRAM(S): at 09:31

## 2022-01-01 NOTE — PROGRESS NOTE ADULT - ASSESSMENT
Briefly, this is a 50yo woman with a PMH of traumatic C4 quadriplegia dysreflexia and recent admission to [facility] for multifocal PNA who presented from [facility] w/new pressure injuries and mild starvation ketosis, admitted for nutrition, wound care and dispo planning.  Exposed to COVID during this admission.  Medically cleared and LINDSAY planning.     Rosangela Rivera  [phone]

## 2022-01-02 LAB
APPEARANCE UR: CLEAR — SIGNIFICANT CHANGE UP
BACTERIA # UR AUTO: ABNORMAL /HPF
BILIRUB UR-MCNC: NEGATIVE — SIGNIFICANT CHANGE UP
COLOR SPEC: YELLOW — SIGNIFICANT CHANGE UP
COMMENT - URINE: SIGNIFICANT CHANGE UP
DIFF PNL FLD: ABNORMAL
EPI CELLS # UR: SIGNIFICANT CHANGE UP /HPF (ref 0–5)
GLUCOSE UR QL: NEGATIVE — SIGNIFICANT CHANGE UP
KETONES UR-MCNC: NEGATIVE — SIGNIFICANT CHANGE UP
LEUKOCYTE ESTERASE UR-ACNC: ABNORMAL
NITRITE UR-MCNC: POSITIVE
PH UR: 6.5 — SIGNIFICANT CHANGE UP (ref 5–8)
PROT UR-MCNC: NEGATIVE MG/DL — SIGNIFICANT CHANGE UP
RBC CASTS # UR COMP ASSIST: < 5 /HPF — SIGNIFICANT CHANGE UP
SP GR SPEC: 1.01 — SIGNIFICANT CHANGE UP (ref 1–1.03)
UROBILINOGEN FLD QL: 0.2 E.U./DL — SIGNIFICANT CHANGE UP
WBC UR QL: ABNORMAL /HPF

## 2022-01-02 PROCEDURE — 99232 SBSQ HOSP IP/OBS MODERATE 35: CPT | Mod: GC

## 2022-01-02 RX ORDER — PHENAZOPYRIDINE HCL 100 MG
100 TABLET ORAL ONCE
Refills: 0 | Status: COMPLETED | OUTPATIENT
Start: 2022-01-02 | End: 2022-01-03

## 2022-01-02 RX ORDER — PIPERACILLIN AND TAZOBACTAM 4; .5 G/20ML; G/20ML
4.5 INJECTION, POWDER, LYOPHILIZED, FOR SOLUTION INTRAVENOUS EVERY 6 HOURS
Refills: 0 | Status: DISCONTINUED | OUTPATIENT
Start: 2022-01-02 | End: 2022-01-04

## 2022-01-02 RX ADMIN — Medication 1 TABLET(S): at 11:10

## 2022-01-02 RX ADMIN — GABAPENTIN 300 MILLIGRAM(S): 400 CAPSULE ORAL at 22:05

## 2022-01-02 RX ADMIN — Medication 2.5 MILLIGRAM(S): at 11:10

## 2022-01-02 RX ADMIN — Medication 20 MILLIGRAM(S): at 05:52

## 2022-01-02 RX ADMIN — Medication 2.5 MILLIGRAM(S): at 22:05

## 2022-01-02 RX ADMIN — Medication 20 MILLIGRAM(S): at 22:05

## 2022-01-02 RX ADMIN — GABAPENTIN 300 MILLIGRAM(S): 400 CAPSULE ORAL at 14:55

## 2022-01-02 RX ADMIN — Medication 500 MILLIGRAM(S): at 11:10

## 2022-01-02 RX ADMIN — ZINC SULFATE TAB 220 MG (50 MG ZINC EQUIVALENT) 220 MILLIGRAM(S): 220 (50 ZN) TAB at 11:10

## 2022-01-02 RX ADMIN — GABAPENTIN 300 MILLIGRAM(S): 400 CAPSULE ORAL at 05:52

## 2022-01-02 RX ADMIN — Medication 25 MILLIGRAM(S): at 22:05

## 2022-01-02 RX ADMIN — Medication 10 MILLIGRAM(S): at 22:06

## 2022-01-02 RX ADMIN — Medication 20 MILLIGRAM(S): at 14:55

## 2022-01-02 RX ADMIN — ENOXAPARIN SODIUM 40 MILLIGRAM(S): 100 INJECTION SUBCUTANEOUS at 22:05

## 2022-01-02 RX ADMIN — PIPERACILLIN AND TAZOBACTAM 200 GRAM(S): 4; .5 INJECTION, POWDER, LYOPHILIZED, FOR SOLUTION INTRAVENOUS at 19:25

## 2022-01-02 NOTE — PROGRESS NOTE ADULT - ATTENDING COMMENTS
Patient was seen and examined with the resident team today.  I agree with Dr. Dinh's assessment and plan with the following exceptions/additions:     Briefly, this is a 50yo woman with a PMH of traumatic C4 quadriplegia dysreflexia and recent admission to [facility] for multifocal PNA who presented from [facility] w/new pressure injuries and mild starvation ketosis, admitted for nutrition, wound care and dispo planning.  Exposed to COVID during this admission.      #Neglect, starvation ketosis - resolved with IVF's and nursing care  #COVID-19 exposure - please discuss with Epi d/c-ing order tomorrow   #DVT PPx - Lovenox  #Dispo - medically cleared for [facility] before the holiday but waiting for auth     Lab holiday.     Rosangela Rivera  [phone]

## 2022-01-02 NOTE — PROGRESS NOTE ADULT - ASSESSMENT
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

## 2022-01-02 NOTE — PROGRESS NOTE ADULT - SUBJECTIVE AND OBJECTIVE BOX
OVERNIGHT EVENTS: No acute events overnight.     SUBJECTIVE / INTERVAL HPI: Patient seen and examined at bedside. Pt has no new complaints. Pt denies any fever, chills, headache, shortness of breath, nausea, vomiting, diarrhea.     VITAL SIGNS:  Vital Signs Last 24 Hrs  T(C): 36.2 (02 Jan 2022 10:40), Max: 36.8 (01 Jan 2022 20:54)  T(F): 97.1 (02 Jan 2022 10:40), Max: 98.3 (01 Jan 2022 20:54)  HR: 66 (02 Jan 2022 10:40) (60 - 66)  BP: 94/63 (02 Jan 2022 10:40) (91/65 - 122/87)  BP(mean): --  RR: 19 (02 Jan 2022 10:40) (18 - 19)  SpO2: 96% (02 Jan 2022 10:40) (95% - 98%)    PHYSICAL EXAM:  Constitutional: thin, middle-aged female resting comfortably in bed, NAD  HEENT: PERRL, EOMI grossly, sclera anicteric, neck supple, no JVD, MMM, good dentition  Respiratory: CTA b/l; no wheezing, no rhonchi, no rales; unlabored respirations  Cardiovascular: RRR, normal S1, S2; +holosystolic murmur (pt confirms present for most of her life) no R/G  Gastrointestinal: soft, NT/ND; no masses palpable; BS normoactive x4 quadrants  : urostomy w/ clear yellow output  Extremities: Warm and well perfused; 2+ pulses equal bilateral lower extremities, R radial pulse palpable, L radial pulse 2+; no edema, cyanosis, or clubbing  Neurological: AOx3, CN II-XII grossly intact, marked atrophy of b/l LE>UE, unable to move b/l LE but able to move b/l UE   Skin: dressings covering pressure injuries on L wrist, R ankle, and sacrum      MEDICATIONS:  MEDICATIONS  (STANDING):  amitriptyline 25 milliGRAM(s) Oral at bedtime  ascorbic acid 500 milliGRAM(s) Oral daily  baclofen 20 milliGRAM(s) Oral three times a day  bisacodyl Suppository 10 milliGRAM(s) Rectal at bedtime  dronabinol 2.5 milliGRAM(s) Oral every 12 hours  enoxaparin Injectable 40 milliGRAM(s) SubCutaneous at bedtime  gabapentin 300 milliGRAM(s) Oral three times a day  influenza   Vaccine 0.5 milliLiter(s) IntraMuscular once  multivitamin 1 Tablet(s) Oral daily  zinc sulfate 220 milliGRAM(s) Oral daily    MEDICATIONS  (PRN):      ALLERGIES:  Allergies    No Known Allergies    Intolerances        LABS:              CAPILLARY BLOOD GLUCOSE      POCT Blood Glucose.: 89 mg/dL (01 Jan 2022 11:42)      RADIOLOGY & ADDITIONAL TESTS: Reviewed.

## 2022-01-03 DIAGNOSIS — N39.0 URINARY TRACT INFECTION, SITE NOT SPECIFIED: ICD-10-CM

## 2022-01-03 PROCEDURE — 99233 SBSQ HOSP IP/OBS HIGH 50: CPT | Mod: GC

## 2022-01-03 RX ORDER — DRONABINOL 2.5 MG
2.5 CAPSULE ORAL EVERY 12 HOURS
Refills: 0 | Status: DISCONTINUED | OUTPATIENT
Start: 2022-01-03 | End: 2022-01-08

## 2022-01-03 RX ADMIN — Medication 10 MILLIGRAM(S): at 22:19

## 2022-01-03 RX ADMIN — GABAPENTIN 300 MILLIGRAM(S): 400 CAPSULE ORAL at 06:45

## 2022-01-03 RX ADMIN — Medication 1 TABLET(S): at 11:34

## 2022-01-03 RX ADMIN — Medication 2.5 MILLIGRAM(S): at 12:12

## 2022-01-03 RX ADMIN — Medication 500 MILLIGRAM(S): at 11:33

## 2022-01-03 RX ADMIN — ENOXAPARIN SODIUM 40 MILLIGRAM(S): 100 INJECTION SUBCUTANEOUS at 22:19

## 2022-01-03 RX ADMIN — Medication 20 MILLIGRAM(S): at 22:19

## 2022-01-03 RX ADMIN — Medication 25 MILLIGRAM(S): at 22:19

## 2022-01-03 RX ADMIN — Medication 2.5 MILLIGRAM(S): at 23:47

## 2022-01-03 RX ADMIN — GABAPENTIN 300 MILLIGRAM(S): 400 CAPSULE ORAL at 22:19

## 2022-01-03 RX ADMIN — Medication 100 MILLIGRAM(S): at 00:10

## 2022-01-03 RX ADMIN — PIPERACILLIN AND TAZOBACTAM 200 GRAM(S): 4; .5 INJECTION, POWDER, LYOPHILIZED, FOR SOLUTION INTRAVENOUS at 11:33

## 2022-01-03 RX ADMIN — PIPERACILLIN AND TAZOBACTAM 200 GRAM(S): 4; .5 INJECTION, POWDER, LYOPHILIZED, FOR SOLUTION INTRAVENOUS at 00:09

## 2022-01-03 RX ADMIN — PIPERACILLIN AND TAZOBACTAM 200 GRAM(S): 4; .5 INJECTION, POWDER, LYOPHILIZED, FOR SOLUTION INTRAVENOUS at 17:44

## 2022-01-03 RX ADMIN — Medication 20 MILLIGRAM(S): at 13:06

## 2022-01-03 RX ADMIN — Medication 20 MILLIGRAM(S): at 06:45

## 2022-01-03 RX ADMIN — PIPERACILLIN AND TAZOBACTAM 200 GRAM(S): 4; .5 INJECTION, POWDER, LYOPHILIZED, FOR SOLUTION INTRAVENOUS at 06:45

## 2022-01-03 RX ADMIN — ZINC SULFATE TAB 220 MG (50 MG ZINC EQUIVALENT) 220 MILLIGRAM(S): 220 (50 ZN) TAB at 11:34

## 2022-01-03 RX ADMIN — PIPERACILLIN AND TAZOBACTAM 200 GRAM(S): 4; .5 INJECTION, POWDER, LYOPHILIZED, FOR SOLUTION INTRAVENOUS at 23:47

## 2022-01-03 RX ADMIN — GABAPENTIN 300 MILLIGRAM(S): 400 CAPSULE ORAL at 13:05

## 2022-01-03 NOTE — PROGRESS NOTE ADULT - ATTENDING COMMENTS
Patient discussed with resident team and plan of care reviewed. I have personally reviewed all pertinent labs and imaging and performed an independent history and physical. Resident note personally reviewed, and I agree with above resident note with the following additions:    51YOF with history of C4 quadriplegia, recent admission to [facility] for pneumonia, discharged to [facility], admitted with new pressure injuries and mild starvation ketosis. Now awaiting subacute rehab placement.    Getting abx for UTI - f/u cultures. Awaiting auth for [facility].

## 2022-01-03 NOTE — PROGRESS NOTE ADULT - SUBJECTIVE AND OBJECTIVE BOX
OVERNIGHT EVENTS: No acute events overnight.     SUBJECTIVE / INTERVAL HPI: Patient seen and examined at bedside. Pt has no new complaints. Pt denies any fever, chills, headache, shortness of breath, nausea, vomiting, diarrhea.     VITAL SIGNS:  Vital Signs Last 24 Hrs  T(C): 36.4 (2022 06:08), Max: 36.4 (2022 20:53)  T(F): 97.6 (2022 06:08), Max: 97.6 (2022 20:53)  HR: 60 (2022 06:08) (60 - 67)  BP: 96/63 (2022 06:08) (96/63 - 96/68)  BP(mean): --  RR: 17 (2022 06:08) (17 - 18)  SpO2: 97% (2022 06:08) (95% - 97%)    PHYSICAL EXAM:  Constitutional: thin, middle-aged female resting comfortably in bed, NAD  HEENT: PERRL, EOMI grossly, sclera anicteric, neck supple, no JVD, MMM, good dentition  Respiratory: CTA b/l; no wheezing, no rhonchi, no rales; unlabored respirations  Cardiovascular: RRR, normal S1, S2; +holosystolic murmur (pt confirms present for most of her life) no R/G  Gastrointestinal: soft, NT/ND; no masses palpable; BS normoactive x4 quadrants  : urostomy w/ clear yellow output  Extremities: Warm and well perfused; 2+ pulses equal bilateral lower extremities, R radial pulse palpable, L radial pulse 2+; no edema, cyanosis, or clubbing  Neurological: AOx3, CN II-XII grossly intact, marked atrophy of b/l LE>UE, unable to move b/l LE but able to move b/l UE   Skin: dressings covering pressure injuries on L wrist, R ankle, and sacrum      MEDICATIONS:  MEDICATIONS  (STANDING):  amitriptyline 25 milliGRAM(s) Oral at bedtime  ascorbic acid 500 milliGRAM(s) Oral daily  baclofen 20 milliGRAM(s) Oral three times a day  bisacodyl Suppository 10 milliGRAM(s) Rectal at bedtime  dronabinol 2.5 milliGRAM(s) Oral every 12 hours  enoxaparin Injectable 40 milliGRAM(s) SubCutaneous at bedtime  gabapentin 300 milliGRAM(s) Oral three times a day  influenza   Vaccine 0.5 milliLiter(s) IntraMuscular once  multivitamin 1 Tablet(s) Oral daily  piperacillin/tazobactam IVPB.. 4.5 Gram(s) IV Intermittent every 6 hours  zinc sulfate 220 milliGRAM(s) Oral daily    MEDICATIONS  (PRN):      ALLERGIES:  Allergies    No Known Allergies    Intolerances        LABS:            Urinalysis Basic - ( 2022 16:48 )    Color: Yellow / Appearance: Clear / S.010 / pH: x  Gluc: x / Ketone: NEGATIVE  / Bili: Negative / Urobili: 0.2 E.U./dL   Blood: x / Protein: NEGATIVE mg/dL / Nitrite: POSITIVE   Leuk Esterase: Trace / RBC: < 5 /HPF / WBC 5-10 /HPF   Sq Epi: x / Non Sq Epi: 0-5 /HPF / Bacteria: Many /HPF      CAPILLARY BLOOD GLUCOSE          RADIOLOGY & ADDITIONAL TESTS: Reviewed.

## 2022-01-03 NOTE — CHART NOTE - NSCHARTNOTEFT_GEN_A_CORE
Admitting Diagnosis:   Patient is a 51y old  Female who presents with a chief complaint of rehab placement (03 Jan 2022 07:53)    PAST MEDICAL & SURGICAL HISTORY:  Quadriplegia    Current Nutrition Order: Regular diet with Ensure Plant BID (360kcal/40g pro)     PO Intake: Good (%) [   ]  Fair (50-75%) [ x ] Poor (<25%) [   ]    GI Issues:   Denies n/v  Last +BM   Fecal incontinence 1/3  No abdominal distention or discomfort     Pain:  Denies pain at this time.    Skin Integrity:  Jose A 12  No edema  Pressure Injuries: Stage III sacral, Stage IV R ankle, Stage I bilateral heel    Medications:  MEDICATIONS  (STANDING):  amitriptyline 25 milliGRAM(s) Oral at bedtime  ascorbic acid 500 milliGRAM(s) Oral daily  baclofen 20 milliGRAM(s) Oral three times a day  bisacodyl Suppository 10 milliGRAM(s) Rectal at bedtime  dronabinol 2.5 milliGRAM(s) Oral every 12 hours  enoxaparin Injectable 40 milliGRAM(s) SubCutaneous at bedtime  gabapentin 300 milliGRAM(s) Oral three times a day  influenza   Vaccine 0.5 milliLiter(s) IntraMuscular once  multivitamin 1 Tablet(s) Oral daily  piperacillin/tazobactam IVPB.. 4.5 Gram(s) IV Intermittent every 6 hours  zinc sulfate 220 milliGRAM(s) Oral daily    Admitted Anthropometrics:   Weight: 105lb, 47.6kg   height: 5'7, 170.2cm   BMI: 16.4  IBW: 135lbs, 61.4kg  %IBW: 77     Weight Change: No new wts since admission. Please continue to trend wts biweekly.     Nutrition Focused Physical Exam: Completed [   ]  Not Pertinent [   ] - N/A pt previously diagnosed with severe PCM 12/26     Suspect Severe PCM 2/2 to physical assessment, poor PO intake, and wt loss; please see malnutrition chart note.    Estimated energy needs:   Calories: 1450-1650kcals (30-35kcals/kg)  Protein: 65-75g (1.4-1.6g/kg)  Fluid: 1650-1900ml (35-40ml/kg)  ***ABW used for calculations as pt between % of IBW (86%). Nutrient needs based on [facility] standards of care for maintenance in older adults. IBW and needs adjusted for quadriplegia, decubitus pressure ulcer, suspected severe PCM    Subjective: 51y yo Female  with PMH of bedbound, remote traumatic c4 quadriplegia, dysreflexia, recently hospitalized for b/l pna ([facility]) discharged to rehab on 12/22, [facility]. Pt presented with metabolic acidosis, decubitus ulcer, and neuropathy. Also noted to be quadriplegia. Started on Marinol 12/27. Wound care follow for management of pressure injuries. SW following, discharge pending placement to [facility].     On assessment, pt was resting comfortably in bed. Currently on regular diet with Ensure plant BID. Pt reports good appetite - eating approximately 75% of meals. Enjoys the ensures  - is drinking less as PO intake increases. Reinforced importance of adequate PO/protein intake for wound healing. Pt receptive and understanding. Denied pain/discomfort at this time. As per flowsheets, report of fecal incontinence 1/3. Please see below for other nutrition recommendations.     Previous Nutrition Diagnosis: Malnutrition R/T lack of support from living environment AEB inadequate energy and protein intake for at least 1 week and signs of muscle wasting    Active [ x  ]  Resolved [   ] - improving     Goal: Pt to meet >75% of nutrient needs via po intake and show no further s/s of malnutrition.     Recommendations:  1. Continue with regular diet and Ensure plant BID (360kcal/40g pro) . \  >>Monitor %PO intake and diet tolerance.   >>Continue with feeding assistance  >>Should pt continue to consume 75% of meals, consider decreasing supplements to one/day.  2. Pain and bowel regimen per team.   3. Continue supplementation of MVI, Zinc Sulfate, Vit C for wound healing   4. Continue appetite stimulant (Marinol)  5. Please obtain new wt and trend weights biweekly     Education: Reinforced importance of adequate PO/protein intake.     Risk Level: High [ x  ] Moderate [  ] Low [   ]

## 2022-01-03 NOTE — PROGRESS NOTE ADULT - PROBLEM SELECTOR PLAN 1
Simple UTI. Pt c/o dysuria on 1/2, found to have new UTI. Acquired in hospital. Given chronic urostomy bag, covering for Pseudomonas. Afebrile, no WBC count.  - C/w Zosyn 4.5g q6h for 3 days (1/2-1/4)

## 2022-01-03 NOTE — PROGRESS NOTE ADULT - ASSESSMENT
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

## 2022-01-04 LAB
-  AMIKACIN: SIGNIFICANT CHANGE UP
-  AMPICILLIN/SULBACTAM: SIGNIFICANT CHANGE UP
-  AMPICILLIN/SULBACTAM: SIGNIFICANT CHANGE UP
-  AMPICILLIN: SIGNIFICANT CHANGE UP
-  AMPICILLIN: SIGNIFICANT CHANGE UP
-  CEFAZOLIN: SIGNIFICANT CHANGE UP
-  CEFAZOLIN: SIGNIFICANT CHANGE UP
-  CEFTRIAXONE: SIGNIFICANT CHANGE UP
-  CEFTRIAXONE: SIGNIFICANT CHANGE UP
-  CIPROFLOXACIN: SIGNIFICANT CHANGE UP
-  CIPROFLOXACIN: SIGNIFICANT CHANGE UP
-  ERTAPENEM: SIGNIFICANT CHANGE UP
-  ERTAPENEM: SIGNIFICANT CHANGE UP
-  GENTAMICIN: SIGNIFICANT CHANGE UP
-  GENTAMICIN: SIGNIFICANT CHANGE UP
-  NITROFURANTOIN: SIGNIFICANT CHANGE UP
-  NITROFURANTOIN: SIGNIFICANT CHANGE UP
-  PIPERACILLIN/TAZOBACTAM: SIGNIFICANT CHANGE UP
-  PIPERACILLIN/TAZOBACTAM: SIGNIFICANT CHANGE UP
-  TOBRAMYCIN: SIGNIFICANT CHANGE UP
-  TOBRAMYCIN: SIGNIFICANT CHANGE UP
-  TRIMETHOPRIM/SULFAMETHOXAZOLE: SIGNIFICANT CHANGE UP
-  TRIMETHOPRIM/SULFAMETHOXAZOLE: SIGNIFICANT CHANGE UP
ANION GAP SERPL CALC-SCNC: 10 MMOL/L — SIGNIFICANT CHANGE UP (ref 5–17)
BUN SERPL-MCNC: 12 MG/DL — SIGNIFICANT CHANGE UP (ref 7–23)
CALCIUM SERPL-MCNC: 9.2 MG/DL — SIGNIFICANT CHANGE UP (ref 8.4–10.5)
CHLORIDE SERPL-SCNC: 104 MMOL/L — SIGNIFICANT CHANGE UP (ref 96–108)
CO2 SERPL-SCNC: 26 MMOL/L — SIGNIFICANT CHANGE UP (ref 22–31)
CREAT SERPL-MCNC: 0.29 MG/DL — LOW (ref 0.5–1.3)
CULTURE RESULTS: SIGNIFICANT CHANGE UP
GLUCOSE SERPL-MCNC: 87 MG/DL — SIGNIFICANT CHANGE UP (ref 70–99)
HCT VFR BLD CALC: 33.2 % — LOW (ref 34.5–45)
HGB BLD-MCNC: 10.6 G/DL — LOW (ref 11.5–15.5)
MAGNESIUM SERPL-MCNC: 2.2 MG/DL — SIGNIFICANT CHANGE UP (ref 1.6–2.6)
MCHC RBC-ENTMCNC: 30.9 PG — SIGNIFICANT CHANGE UP (ref 27–34)
MCHC RBC-ENTMCNC: 31.9 GM/DL — LOW (ref 32–36)
MCV RBC AUTO: 96.8 FL — SIGNIFICANT CHANGE UP (ref 80–100)
METHOD TYPE: SIGNIFICANT CHANGE UP
METHOD TYPE: SIGNIFICANT CHANGE UP
NRBC # BLD: 0 /100 WBCS — SIGNIFICANT CHANGE UP (ref 0–0)
ORGANISM # SPEC MICROSCOPIC CNT: SIGNIFICANT CHANGE UP
PHOSPHATE SERPL-MCNC: 4.5 MG/DL — SIGNIFICANT CHANGE UP (ref 2.5–4.5)
PLATELET # BLD AUTO: 225 K/UL — SIGNIFICANT CHANGE UP (ref 150–400)
POTASSIUM SERPL-MCNC: 3.8 MMOL/L — SIGNIFICANT CHANGE UP (ref 3.5–5.3)
POTASSIUM SERPL-SCNC: 3.8 MMOL/L — SIGNIFICANT CHANGE UP (ref 3.5–5.3)
RBC # BLD: 3.43 M/UL — LOW (ref 3.8–5.2)
RBC # FLD: 15.2 % — HIGH (ref 10.3–14.5)
SODIUM SERPL-SCNC: 140 MMOL/L — SIGNIFICANT CHANGE UP (ref 135–145)
SPECIMEN SOURCE: SIGNIFICANT CHANGE UP
WBC # BLD: 4.23 K/UL — SIGNIFICANT CHANGE UP (ref 3.8–10.5)
WBC # FLD AUTO: 4.23 K/UL — SIGNIFICANT CHANGE UP (ref 3.8–10.5)

## 2022-01-04 PROCEDURE — 99233 SBSQ HOSP IP/OBS HIGH 50: CPT | Mod: GC

## 2022-01-04 RX ORDER — CEFTRIAXONE 500 MG/1
1000 INJECTION, POWDER, FOR SOLUTION INTRAMUSCULAR; INTRAVENOUS EVERY 24 HOURS
Refills: 0 | Status: COMPLETED | OUTPATIENT
Start: 2022-01-04 | End: 2022-01-05

## 2022-01-04 RX ORDER — MULTIVIT WITH MIN/MFOLATE/K2 340-15/3 G
1 POWDER (GRAM) ORAL ONCE
Refills: 0 | Status: COMPLETED | OUTPATIENT
Start: 2022-01-04 | End: 2022-01-04

## 2022-01-04 RX ADMIN — PIPERACILLIN AND TAZOBACTAM 200 GRAM(S): 4; .5 INJECTION, POWDER, LYOPHILIZED, FOR SOLUTION INTRAVENOUS at 11:50

## 2022-01-04 RX ADMIN — GABAPENTIN 300 MILLIGRAM(S): 400 CAPSULE ORAL at 05:01

## 2022-01-04 RX ADMIN — Medication 1 TABLET(S): at 11:50

## 2022-01-04 RX ADMIN — Medication 20 MILLIGRAM(S): at 22:01

## 2022-01-04 RX ADMIN — GABAPENTIN 300 MILLIGRAM(S): 400 CAPSULE ORAL at 14:30

## 2022-01-04 RX ADMIN — Medication 20 MILLIGRAM(S): at 14:30

## 2022-01-04 RX ADMIN — ENOXAPARIN SODIUM 40 MILLIGRAM(S): 100 INJECTION SUBCUTANEOUS at 22:01

## 2022-01-04 RX ADMIN — Medication 25 MILLIGRAM(S): at 22:01

## 2022-01-04 RX ADMIN — Medication 10 MILLIGRAM(S): at 22:02

## 2022-01-04 RX ADMIN — GABAPENTIN 300 MILLIGRAM(S): 400 CAPSULE ORAL at 22:01

## 2022-01-04 RX ADMIN — CEFTRIAXONE 100 MILLIGRAM(S): 500 INJECTION, POWDER, FOR SOLUTION INTRAMUSCULAR; INTRAVENOUS at 17:46

## 2022-01-04 RX ADMIN — Medication 500 MILLIGRAM(S): at 11:50

## 2022-01-04 RX ADMIN — ZINC SULFATE TAB 220 MG (50 MG ZINC EQUIVALENT) 220 MILLIGRAM(S): 220 (50 ZN) TAB at 11:50

## 2022-01-04 RX ADMIN — Medication 20 MILLIGRAM(S): at 05:01

## 2022-01-04 RX ADMIN — Medication 2.5 MILLIGRAM(S): at 11:50

## 2022-01-04 RX ADMIN — Medication 2.5 MILLIGRAM(S): at 22:01

## 2022-01-04 RX ADMIN — PIPERACILLIN AND TAZOBACTAM 200 GRAM(S): 4; .5 INJECTION, POWDER, LYOPHILIZED, FOR SOLUTION INTRAVENOUS at 05:01

## 2022-01-04 NOTE — PROGRESS NOTE ADULT - PROBLEM SELECTOR PLAN 1
Simple UTI. Pt c/o dysuria on 1/2, found to have new UTI. Acquired in hospital. Given chronic urostomy bag, covering for Pseudomonas. Afebrile, no WBC count.  - S/p Zosyn 4.5g q6h, Pseudomonas dosing given urostomy (1/3-1/4)  - C/w CTX for remaining 2 days (1/4-1/5)

## 2022-01-04 NOTE — PROGRESS NOTE ADULT - ASSESSMENT
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

## 2022-01-04 NOTE — PROGRESS NOTE ADULT - ATTENDING COMMENTS
Patient discussed with resident team and plan of care reviewed. I have personally reviewed all pertinent labs and imaging and performed an independent history and physical. Resident note personally reviewed, and I agree with above resident note with the following additions:    51YOF with history of C4 quadriplegia, recent admission to [facility] for pneumonia, discharged to [facility], admitted with new pressure injuries and mild starvation ketosis. Also with UTI, UCx growing E coli and Klebsiella. Now awaiting subacute rehab placement.    Await auth for [facility] vs safe discharge plan (reinstatement of HHA?)

## 2022-01-04 NOTE — PROGRESS NOTE ADULT - SUBJECTIVE AND OBJECTIVE BOX
OVERNIGHT EVENTS: No acute events overnight.     SUBJECTIVE / INTERVAL HPI: Patient seen and examined at bedside. Pt felt subjectively constipated last night and requested Mg citrate, had a bowel movement in early morning w/ soft stool, no diarrhea. Reports dysuria has resolved. Denies any shortness of breath, chest pain, fever.    VITAL SIGNS:  Vital Signs Last 24 Hrs  T(C): 36.4 (04 Jan 2022 16:09), Max: 36.8 (03 Jan 2022 20:59)  T(F): 97.5 (04 Jan 2022 16:09), Max: 98.3 (03 Jan 2022 20:59)  HR: 67 (04 Jan 2022 16:09) (60 - 67)  BP: 91/61 (04 Jan 2022 16:09) (91/53 - 92/59)  BP(mean): --  RR: 19 (04 Jan 2022 16:09) (18 - 19)  SpO2: 100% (04 Jan 2022 16:09) (95% - 100%)    PHYSICAL EXAM:  Constitutional: thin, middle-aged female resting comfortably in bed, NAD  HEENT: PERRL, EOMI grossly, sclera anicteric, neck supple, no JVD, MMM, good dentition  Respiratory: CTA b/l; no wheezing, no rhonchi, no rales; unlabored respirations  Cardiovascular: RRR, normal S1, S2; +holosystolic murmur (pt confirms present for most of her life) no R/G  Gastrointestinal: soft, NT/ND; no masses palpable; BS normoactive x4 quadrants  : urostomy w/ clear yellow output  Extremities: Warm and well perfused; 2+ pulses equal bilateral lower extremities, R radial pulse palpable, L radial pulse 2+; no edema, cyanosis, or clubbing  Neurological: AOx3, CN II-XII grossly intact, marked atrophy of b/l LE>UE, unable to move b/l LE but able to move b/l UE   Skin: dressings covering pressure injuries on L wrist, R ankle, and sacrum    MEDICATIONS:  MEDICATIONS  (STANDING):  amitriptyline 25 milliGRAM(s) Oral at bedtime  ascorbic acid 500 milliGRAM(s) Oral daily  baclofen 20 milliGRAM(s) Oral three times a day  bisacodyl Suppository 10 milliGRAM(s) Rectal at bedtime  cefTRIAXone   IVPB 1000 milliGRAM(s) IV Intermittent every 24 hours  dronabinol 2.5 milliGRAM(s) Oral every 12 hours  enoxaparin Injectable 40 milliGRAM(s) SubCutaneous at bedtime  gabapentin 300 milliGRAM(s) Oral three times a day  influenza   Vaccine 0.5 milliLiter(s) IntraMuscular once  multivitamin 1 Tablet(s) Oral daily  zinc sulfate 220 milliGRAM(s) Oral daily    MEDICATIONS  (PRN):      ALLERGIES:  Allergies    No Known Allergies    Intolerances        LABS:                        10.6   4.23  )-----------( 225      ( 04 Jan 2022 08:31 )             33.2     01-04    140  |  104  |  12  ----------------------------<  87  3.8   |  26  |  0.29<L>    Ca    9.2      04 Jan 2022 08:31  Phos  4.5     01-04  Mg     2.2     01-04          CAPILLARY BLOOD GLUCOSE          RADIOLOGY & ADDITIONAL TESTS: Reviewed.

## 2022-01-04 NOTE — PROGRESS NOTE ADULT - PROBLEM SELECTOR PLAN 3
Patient with new decub ulcers which arose at rehab facility due to neglect and bedding inappropriate for her condition, and lack of adequate turning.   - Wound care consulted, apply thin layer Triad, cleanse wound w/ saline, pack aquacel into wound  - frequent repositioning and dressing changes per nursing

## 2022-01-05 LAB — SARS-COV-2 RNA SPEC QL NAA+PROBE: SIGNIFICANT CHANGE UP

## 2022-01-05 PROCEDURE — 99232 SBSQ HOSP IP/OBS MODERATE 35: CPT | Mod: GC

## 2022-01-05 RX ADMIN — Medication 1 TABLET(S): at 11:37

## 2022-01-05 RX ADMIN — GABAPENTIN 300 MILLIGRAM(S): 400 CAPSULE ORAL at 22:21

## 2022-01-05 RX ADMIN — Medication 2.5 MILLIGRAM(S): at 22:21

## 2022-01-05 RX ADMIN — Medication 20 MILLIGRAM(S): at 13:00

## 2022-01-05 RX ADMIN — Medication 25 MILLIGRAM(S): at 22:21

## 2022-01-05 RX ADMIN — Medication 20 MILLIGRAM(S): at 05:01

## 2022-01-05 RX ADMIN — Medication 10 MILLIGRAM(S): at 22:23

## 2022-01-05 RX ADMIN — ZINC SULFATE TAB 220 MG (50 MG ZINC EQUIVALENT) 220 MILLIGRAM(S): 220 (50 ZN) TAB at 11:36

## 2022-01-05 RX ADMIN — CEFTRIAXONE 100 MILLIGRAM(S): 500 INJECTION, POWDER, FOR SOLUTION INTRAMUSCULAR; INTRAVENOUS at 18:14

## 2022-01-05 RX ADMIN — Medication 20 MILLIGRAM(S): at 22:21

## 2022-01-05 RX ADMIN — GABAPENTIN 300 MILLIGRAM(S): 400 CAPSULE ORAL at 13:00

## 2022-01-05 RX ADMIN — ENOXAPARIN SODIUM 40 MILLIGRAM(S): 100 INJECTION SUBCUTANEOUS at 22:21

## 2022-01-05 RX ADMIN — GABAPENTIN 300 MILLIGRAM(S): 400 CAPSULE ORAL at 05:00

## 2022-01-05 RX ADMIN — Medication 2.5 MILLIGRAM(S): at 11:36

## 2022-01-05 RX ADMIN — Medication 500 MILLIGRAM(S): at 11:37

## 2022-01-05 NOTE — PROGRESS NOTE ADULT - PROBLEM SELECTOR PLAN 2
Exposed to COVID on 12/28. Asymptomatic.  - [facility] auth denied - appealing vs working on reinstatement of 24h HHA  - If COVID neg 1/5, can be removed from isolation

## 2022-01-05 NOTE — PROGRESS NOTE ADULT - ATTENDING COMMENTS
Patient discussed with resident team and plan of care reviewed. I have personally reviewed all pertinent labs and imaging and performed an independent history and physical. Resident note personally reviewed, and I agree with above resident note with the following additions:    51YOF with history of C4 quadriplegia, recent admission to [facility] for pneumonia, discharged to [facility], admitted with new pressure injuries and mild starvation ketosis. Also with UTI, UCx growing E coli and Klebsiella. Now awaiting subacute rehab placement.    [employer] auth denied - appealing vs working on reinstatement of 24h HHA Patient discussed with resident team and plan of care reviewed. I have personally reviewed all pertinent labs and imaging and performed an independent history and physical. Resident note personally reviewed, and I agree with above resident note with the following additions:    51YOF with history of C4 quadriplegia, recent admission to [facility] for pneumonia, discharged to [facility], admitted with new pressure injuries and mild starvation ketosis. Also with UTI, UCx growing E coli and Klebsiella. Now awaiting subacute rehab placement.    [facility] auth denied - appealing vs working on reinstatement of 24h HHA. In the meantime will work with epidemiology on removal from COVID isolation. Patient discussed with resident team and plan of care reviewed. I have personally reviewed all pertinent labs and imaging and performed an independent history and physical. Resident note personally reviewed, and I agree with above resident note with the following additions:    51YOF with history of C4 quadriplegia, recent admission to [facility] for pneumonia, discharged to [facility], admitted with new pressure injuries and mild starvation ketosis. Also with UTI, UCx growing E coli and Klebsiella. Now awaiting subacute rehab placement.    [employer] auth denied - appealing vs working on reinstatement of 24h HHA. In the meantime will work with epidemiology on removal from COVID isolation (exposure).

## 2022-01-05 NOTE — PROGRESS NOTE ADULT - PROBLEM SELECTOR PLAN 1
Simple UTI. Pt c/o dysuria on 1/2, found to have new UTI. Acquired in hospital. Given chronic urostomy bag, covering for Pseudomonas. Afebrile, no WBC count.  - S/p Zosyn 4.5g q6h, Pseudomonas dosing given urostomy (1/3-1/4)  - C/w CTX for remaining (1/4-1/5)

## 2022-01-05 NOTE — PROGRESS NOTE ADULT - ASSESSMENT
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

## 2022-01-05 NOTE — PROVIDER CONTACT NOTE (CHANGE IN STATUS NOTIFICATION) - ACTION/TREATMENT ORDERED:
Notified provider. Pt baseline. Pt resting comfortably. No interventions at this time. Will continue to monitor.

## 2022-01-05 NOTE — PROVIDER CONTACT NOTE (CHANGE IN STATUS NOTIFICATION) - BACKGROUND
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

## 2022-01-05 NOTE — PROVIDER CONTACT NOTE (CHANGE IN STATUS NOTIFICATION) - ASSESSMENT
Pt AOx4 speaking in clear spontaneous sentences. Pt basline. Pt asymptomatic. Pt just woken up and doesn't want to be bothered. Pt wants to sleep. temp oral- 97.5F, HR-66, BP-88/52, RR-18 and O2- 96% RA.

## 2022-01-05 NOTE — PROGRESS NOTE ADULT - SUBJECTIVE AND OBJECTIVE BOX
OVERNIGHT EVENTS: No acute events overnight.     SUBJECTIVE / INTERVAL HPI: Patient seen and examined at bedside. Pt has no new complaints. Pt denies any fever, chills, headache, shortness of breath, nausea, vomiting, diarrhea.     VITAL SIGNS:  Vital Signs Last 24 Hrs  T(C): 36.2 (05 Jan 2022 08:55), Max: 36.7 (04 Jan 2022 20:48)  T(F): 97.2 (05 Jan 2022 08:55), Max: 98 (04 Jan 2022 20:48)  HR: 60 (05 Jan 2022 08:55) (60 - 67)  BP: 95/63 (05 Jan 2022 08:55) (88/52 - 96/51)  BP(mean): --  RR: 16 (05 Jan 2022 08:55) (16 - 19)  SpO2: 96% (05 Jan 2022 08:55) (96% - 100%)    PHYSICAL EXAM:  Constitutional: thin, middle-aged female resting comfortably in bed, NAD  HEENT: PERRL, EOMI grossly, sclera anicteric, neck supple, no JVD, MMM, good dentition  Respiratory: CTA b/l; no wheezing, no rhonchi, no rales; unlabored respirations  Cardiovascular: RRR, normal S1, S2; +holosystolic murmur (pt confirms present for most of her life) no R/G  Gastrointestinal: soft, NT/ND; no masses palpable; BS normoactive x4 quadrants  : urostomy w/ clear yellow output  Extremities: Warm and well perfused; 2+ pulses equal bilateral lower extremities, R radial pulse palpable, L radial pulse 2+; no edema, cyanosis, or clubbing  Neurological: AOx3, CN II-XII grossly intact, marked atrophy of b/l LE>UE, unable to move b/l LE but able to move b/l UE   Skin: dressings covering pressure injuries on L wrist, R ankle, and sacrum    MEDICATIONS:  MEDICATIONS  (STANDING):  amitriptyline 25 milliGRAM(s) Oral at bedtime  ascorbic acid 500 milliGRAM(s) Oral daily  baclofen 20 milliGRAM(s) Oral three times a day  bisacodyl Suppository 10 milliGRAM(s) Rectal at bedtime  cefTRIAXone   IVPB 1000 milliGRAM(s) IV Intermittent every 24 hours  dronabinol 2.5 milliGRAM(s) Oral every 12 hours  enoxaparin Injectable 40 milliGRAM(s) SubCutaneous at bedtime  gabapentin 300 milliGRAM(s) Oral three times a day  influenza   Vaccine 0.5 milliLiter(s) IntraMuscular once  multivitamin 1 Tablet(s) Oral daily  zinc sulfate 220 milliGRAM(s) Oral daily    MEDICATIONS  (PRN):      ALLERGIES:  Allergies    No Known Allergies    Intolerances        LABS:                        10.6   4.23  )-----------( 225      ( 04 Jan 2022 08:31 )             33.2     01-04    140  |  104  |  12  ----------------------------<  87  3.8   |  26  |  0.29<L>    Ca    9.2      04 Jan 2022 08:31  Phos  4.5     01-04  Mg     2.2     01-04          CAPILLARY BLOOD GLUCOSE          RADIOLOGY & ADDITIONAL TESTS: Reviewed.

## 2022-01-06 LAB
ANION GAP SERPL CALC-SCNC: 9 MMOL/L — SIGNIFICANT CHANGE UP (ref 5–17)
BUN SERPL-MCNC: 14 MG/DL — SIGNIFICANT CHANGE UP (ref 7–23)
CALCIUM SERPL-MCNC: 9.2 MG/DL — SIGNIFICANT CHANGE UP (ref 8.4–10.5)
CHLORIDE SERPL-SCNC: 105 MMOL/L — SIGNIFICANT CHANGE UP (ref 96–108)
CO2 SERPL-SCNC: 26 MMOL/L — SIGNIFICANT CHANGE UP (ref 22–31)
CREAT SERPL-MCNC: 0.24 MG/DL — LOW (ref 0.5–1.3)
GLUCOSE SERPL-MCNC: 96 MG/DL — SIGNIFICANT CHANGE UP (ref 70–99)
HCT VFR BLD CALC: 36.5 % — SIGNIFICANT CHANGE UP (ref 34.5–45)
HGB BLD-MCNC: 11.9 G/DL — SIGNIFICANT CHANGE UP (ref 11.5–15.5)
MAGNESIUM SERPL-MCNC: 2.2 MG/DL — SIGNIFICANT CHANGE UP (ref 1.6–2.6)
MCHC RBC-ENTMCNC: 31.3 PG — SIGNIFICANT CHANGE UP (ref 27–34)
MCHC RBC-ENTMCNC: 32.6 GM/DL — SIGNIFICANT CHANGE UP (ref 32–36)
MCV RBC AUTO: 96.1 FL — SIGNIFICANT CHANGE UP (ref 80–100)
NRBC # BLD: 0 /100 WBCS — SIGNIFICANT CHANGE UP (ref 0–0)
PHOSPHATE SERPL-MCNC: 3.8 MG/DL — SIGNIFICANT CHANGE UP (ref 2.5–4.5)
PLATELET # BLD AUTO: 260 K/UL — SIGNIFICANT CHANGE UP (ref 150–400)
POTASSIUM SERPL-MCNC: 4.1 MMOL/L — SIGNIFICANT CHANGE UP (ref 3.5–5.3)
POTASSIUM SERPL-SCNC: 4.1 MMOL/L — SIGNIFICANT CHANGE UP (ref 3.5–5.3)
RBC # BLD: 3.8 M/UL — SIGNIFICANT CHANGE UP (ref 3.8–5.2)
RBC # FLD: 14.7 % — HIGH (ref 10.3–14.5)
SODIUM SERPL-SCNC: 140 MMOL/L — SIGNIFICANT CHANGE UP (ref 135–145)
WBC # BLD: 5.15 K/UL — SIGNIFICANT CHANGE UP (ref 3.8–10.5)
WBC # FLD AUTO: 5.15 K/UL — SIGNIFICANT CHANGE UP (ref 3.8–10.5)

## 2022-01-06 PROCEDURE — 99232 SBSQ HOSP IP/OBS MODERATE 35: CPT | Mod: GC

## 2022-01-06 RX ORDER — IBUPROFEN 200 MG
600 TABLET ORAL ONCE
Refills: 0 | Status: COMPLETED | OUTPATIENT
Start: 2022-01-06 | End: 2022-01-06

## 2022-01-06 RX ADMIN — Medication 1 BOTTLE: at 07:07

## 2022-01-06 RX ADMIN — GABAPENTIN 300 MILLIGRAM(S): 400 CAPSULE ORAL at 21:43

## 2022-01-06 RX ADMIN — Medication 1 TABLET(S): at 12:04

## 2022-01-06 RX ADMIN — Medication 600 MILLIGRAM(S): at 23:54

## 2022-01-06 RX ADMIN — ZINC SULFATE TAB 220 MG (50 MG ZINC EQUIVALENT) 220 MILLIGRAM(S): 220 (50 ZN) TAB at 12:04

## 2022-01-06 RX ADMIN — Medication 20 MILLIGRAM(S): at 06:37

## 2022-01-06 RX ADMIN — Medication 25 MILLIGRAM(S): at 21:44

## 2022-01-06 RX ADMIN — GABAPENTIN 300 MILLIGRAM(S): 400 CAPSULE ORAL at 14:19

## 2022-01-06 RX ADMIN — GABAPENTIN 300 MILLIGRAM(S): 400 CAPSULE ORAL at 06:37

## 2022-01-06 RX ADMIN — Medication 2.5 MILLIGRAM(S): at 13:20

## 2022-01-06 RX ADMIN — Medication 2.5 MILLIGRAM(S): at 22:50

## 2022-01-06 RX ADMIN — ENOXAPARIN SODIUM 40 MILLIGRAM(S): 100 INJECTION SUBCUTANEOUS at 21:44

## 2022-01-06 RX ADMIN — Medication 20 MILLIGRAM(S): at 21:44

## 2022-01-06 RX ADMIN — Medication 20 MILLIGRAM(S): at 14:19

## 2022-01-06 RX ADMIN — Medication 500 MILLIGRAM(S): at 12:04

## 2022-01-06 NOTE — PROGRESS NOTE ADULT - ATTENDING COMMENTS
Patient discussed with resident team and plan of care reviewed. I have personally reviewed all pertinent labs and imaging and performed an independent history and physical. Resident note personally reviewed, and I agree with above resident note with the following additions:    51YOF with history of C4 quadriplegia, recent admission to [facility] for pneumonia, discharged to [facility], admitted with new pressure injuries and mild starvation ketosis. Also with UTI, UCx growing E coli and Klebsiella. Now awaiting reinstatement of 24hr HHA, as [facility] auth was denied by insurance.    No new issues today. Waiting on reinstatement of 24h HHA for safe d/c. Now off COVID isolation.

## 2022-01-06 NOTE — PROGRESS NOTE ADULT - ASSESSMENT
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

## 2022-01-06 NOTE — PROGRESS NOTE ADULT - PROBLEM SELECTOR PLAN 1
Simple UTI. Pt c/o dysuria on 1/2, found to have new UTI. Acquired in hospital. Given chronic urostomy bag, covering for Pseudomonas. Afebrile, no WBC count.  - S/p Zosyn 4.5g q6h, Pseudomonas dosing given urostomy (1/3-1/4)  - S/p CTX for remaining (1/4-1/5) w/ resolution of symptoms

## 2022-01-06 NOTE — PROGRESS NOTE ADULT - PROBLEM SELECTOR PLAN 2
Exposed to COVID on 12/28. Asymptomatic.  - [employer] auth pending  - If COVID neg 1/5, removed from isolation

## 2022-01-06 NOTE — PROGRESS NOTE ADULT - SUBJECTIVE AND OBJECTIVE BOX
OVERNIGHT EVENTS: No acute events overnight.     SUBJECTIVE / INTERVAL HPI: Patient seen and examined at bedside. Pt denies any new complaints. Reports resolution of dysuria. Denies any fever, cough, SOB, diarrhea, headache.    VITAL SIGNS:  Vital Signs Last 24 Hrs  T(C): 36.3 (06 Jan 2022 16:15), Max: 36.5 (05 Jan 2022 21:04)  T(F): 97.4 (06 Jan 2022 16:15), Max: 97.7 (05 Jan 2022 21:04)  HR: 64 (06 Jan 2022 16:15) (64 - 79)  BP: 91/60 (06 Jan 2022 16:15) (91/60 - 137/91)  BP(mean): --  RR: 18 (06 Jan 2022 16:15) (16 - 18)  SpO2: 98% (06 Jan 2022 16:15) (97% - 98%)    PHYSICAL EXAM:  Constitutional: thin, middle-aged female resting comfortably in bed, NAD  HEENT: PERRL, EOMI grossly, sclera anicteric, neck supple, no JVD, MMM, good dentition  Respiratory: CTA b/l; no wheezing, no rhonchi, no rales; unlabored respirations  Cardiovascular: RRR, normal S1, S2; +holosystolic murmur (pt confirms present for most of her life) no R/G  Gastrointestinal: soft, NT/ND; no masses palpable; BS normoactive x4 quadrants  : urostomy w/ clear yellow output  Extremities: Warm and well perfused; 2+ pulses equal bilateral lower extremities, R radial pulse palpable, L radial pulse 2+; no edema, cyanosis, or clubbing  Neurological: AOx3, CN II-XII grossly intact, marked atrophy of b/l LE>UE, unable to move b/l LE but able to move b/l UE   Skin: dressings covering pressure injuries on L wrist, R ankle, and sacrum    MEDICATIONS:  MEDICATIONS  (STANDING):  amitriptyline 25 milliGRAM(s) Oral at bedtime  ascorbic acid 500 milliGRAM(s) Oral daily  baclofen 20 milliGRAM(s) Oral three times a day  bisacodyl Suppository 10 milliGRAM(s) Rectal at bedtime  dronabinol 2.5 milliGRAM(s) Oral every 12 hours  enoxaparin Injectable 40 milliGRAM(s) SubCutaneous at bedtime  gabapentin 300 milliGRAM(s) Oral three times a day  influenza   Vaccine 0.5 milliLiter(s) IntraMuscular once  multivitamin 1 Tablet(s) Oral daily  zinc sulfate 220 milliGRAM(s) Oral daily    MEDICATIONS  (PRN):      ALLERGIES:  Allergies    No Known Allergies    Intolerances        LABS:                        11.9   5.15  )-----------( 260      ( 06 Jan 2022 07:09 )             36.5     01-06    140  |  105  |  14  ----------------------------<  96  4.1   |  26  |  0.24<L>    Ca    9.2      06 Jan 2022 07:09  Phos  3.8     01-06  Mg     2.2     01-06          CAPILLARY BLOOD GLUCOSE          RADIOLOGY & ADDITIONAL TESTS: Reviewed.

## 2022-01-07 RX ORDER — IBUPROFEN 200 MG
600 TABLET ORAL ONCE
Refills: 0 | Status: COMPLETED | OUTPATIENT
Start: 2022-01-07 | End: 2022-01-07

## 2022-01-07 RX ORDER — SENNA PLUS 8.6 MG/1
2 TABLET ORAL EVERY 24 HOURS
Refills: 0 | Status: DISCONTINUED | OUTPATIENT
Start: 2022-01-07 | End: 2022-01-14

## 2022-01-07 RX ADMIN — Medication 20 MILLIGRAM(S): at 21:55

## 2022-01-07 RX ADMIN — Medication 2.5 MILLIGRAM(S): at 23:11

## 2022-01-07 RX ADMIN — ENOXAPARIN SODIUM 40 MILLIGRAM(S): 100 INJECTION SUBCUTANEOUS at 21:54

## 2022-01-07 RX ADMIN — ZINC SULFATE TAB 220 MG (50 MG ZINC EQUIVALENT) 220 MILLIGRAM(S): 220 (50 ZN) TAB at 11:39

## 2022-01-07 RX ADMIN — Medication 600 MILLIGRAM(S): at 23:11

## 2022-01-07 RX ADMIN — GABAPENTIN 300 MILLIGRAM(S): 400 CAPSULE ORAL at 13:55

## 2022-01-07 RX ADMIN — Medication 600 MILLIGRAM(S): at 00:54

## 2022-01-07 RX ADMIN — GABAPENTIN 300 MILLIGRAM(S): 400 CAPSULE ORAL at 21:55

## 2022-01-07 RX ADMIN — Medication 20 MILLIGRAM(S): at 13:55

## 2022-01-07 RX ADMIN — Medication 1 TABLET(S): at 12:54

## 2022-01-07 RX ADMIN — GABAPENTIN 300 MILLIGRAM(S): 400 CAPSULE ORAL at 06:34

## 2022-01-07 RX ADMIN — Medication 10 MILLIGRAM(S): at 21:55

## 2022-01-07 RX ADMIN — SENNA PLUS 2 TABLET(S): 8.6 TABLET ORAL at 23:11

## 2022-01-07 RX ADMIN — Medication 20 MILLIGRAM(S): at 06:34

## 2022-01-07 RX ADMIN — Medication 25 MILLIGRAM(S): at 21:55

## 2022-01-07 RX ADMIN — Medication 2.5 MILLIGRAM(S): at 11:39

## 2022-01-07 RX ADMIN — Medication 500 MILLIGRAM(S): at 11:39

## 2022-01-07 NOTE — PROGRESS NOTE ADULT - ATTENDING COMMENTS
Patient discussed with resident team and plan of care reviewed. I have personally reviewed all pertinent labs and imaging and performed an independent history and physical. Resident note personally reviewed, and I agree with above resident note with the following additions:    51YOF with history of C4 quadriplegia, recent admission to [facility] for pneumonia, discharged to [facility], admitted with new pressure injuries and mild starvation ketosis. Also with UTI, UCx growing E coli and Klebsiella. Now awaiting reinstatement of 24hr HHA, as [facility] auth was denied by insurance.    No new issues today. Waiting on reinstatement of 24h HHA for safe d/c.

## 2022-01-07 NOTE — PROGRESS NOTE ADULT - ASSESSMENT
51F PMHx remote traumatic C4 quadriplegia (bedbound at baseline), dysreflexia, recently hospitalized for b/l PNA ([facility]) and discharged to rehab on 12/22, ARIANNA from [facility] rehab due to neglect, found to have new pressure injuries and mild starvation ketosis, admitted for nutrition, wound care, and dispo planning.

## 2022-01-07 NOTE — PROGRESS NOTE ADULT - SUBJECTIVE AND OBJECTIVE BOX
OVERNIGHT EVENTS: No acute events overnight.     SUBJECTIVE / INTERVAL HPI: Patient seen and examined at bedside. Pt has no new complaints. Pt denies any fever, chills, headache, shortness of breath, nausea, vomiting, diarrhea.     VITAL SIGNS:  Vital Signs Last 24 Hrs  T(C): 36.3 (07 Jan 2022 09:21), Max: 36.4 (06 Jan 2022 20:45)  T(F): 97.3 (07 Jan 2022 09:21), Max: 97.6 (06 Jan 2022 20:45)  HR: 56 (07 Jan 2022 09:21) (56 - 64)  BP: 97/66 (07 Jan 2022 09:21) (90/57 - 97/66)  BP(mean): --  RR: 17 (07 Jan 2022 09:21) (17 - 19)  SpO2: 97% (07 Jan 2022 09:21) (97% - 98%)    PHYSICAL EXAM:  Constitutional: thin, middle-aged female resting comfortably in bed, NAD  HEENT: PERRL, EOMI grossly, sclera anicteric, neck supple, no JVD, MMM, good dentition  Respiratory: CTA b/l; no wheezing, no rhonchi, no rales; unlabored respirations  Cardiovascular: RRR, normal S1, S2; +holosystolic murmur (pt confirms present for most of her life) no R/G  Gastrointestinal: soft, NT/ND; no masses palpable; BS normoactive x4 quadrants  : urostomy w/ clear yellow output  Extremities: Warm and well perfused; 2+ pulses equal bilateral lower extremities, R radial pulse palpable, L radial pulse 2+; no edema, cyanosis, or clubbing  Neurological: AOx3, CN II-XII grossly intact, marked atrophy of b/l LE>UE, unable to move b/l LE but able to move b/l UE   Skin: dressings covering pressure injuries on L wrist, R ankle, and sacrum    MEDICATIONS:  MEDICATIONS  (STANDING):  amitriptyline 25 milliGRAM(s) Oral at bedtime  ascorbic acid 500 milliGRAM(s) Oral daily  baclofen 20 milliGRAM(s) Oral three times a day  bisacodyl Suppository 10 milliGRAM(s) Rectal at bedtime  dronabinol 2.5 milliGRAM(s) Oral every 12 hours  enoxaparin Injectable 40 milliGRAM(s) SubCutaneous at bedtime  gabapentin 300 milliGRAM(s) Oral three times a day  influenza   Vaccine 0.5 milliLiter(s) IntraMuscular once  multivitamin 1 Tablet(s) Oral daily  zinc sulfate 220 milliGRAM(s) Oral daily    MEDICATIONS  (PRN):      ALLERGIES:  Allergies    No Known Allergies    Intolerances        LABS:                        11.9   5.15  )-----------( 260      ( 06 Jan 2022 07:09 )             36.5     01-06    140  |  105  |  14  ----------------------------<  96  4.1   |  26  |  0.24<L>    Ca    9.2      06 Jan 2022 07:09  Phos  3.8     01-06  Mg     2.2     01-06          CAPILLARY BLOOD GLUCOSE          RADIOLOGY & ADDITIONAL TESTS: Reviewed.

## 2022-01-08 LAB
ALBUMIN SERPL ELPH-MCNC: 3.5 G/DL — SIGNIFICANT CHANGE UP (ref 3.3–5)
ALP SERPL-CCNC: 98 U/L — SIGNIFICANT CHANGE UP (ref 40–120)
ALT FLD-CCNC: 11 U/L — SIGNIFICANT CHANGE UP (ref 10–45)
ANION GAP SERPL CALC-SCNC: 11 MMOL/L — SIGNIFICANT CHANGE UP (ref 5–17)
AST SERPL-CCNC: 16 U/L — SIGNIFICANT CHANGE UP (ref 10–40)
BILIRUB SERPL-MCNC: 0.3 MG/DL — SIGNIFICANT CHANGE UP (ref 0.2–1.2)
BUN SERPL-MCNC: 13 MG/DL — SIGNIFICANT CHANGE UP (ref 7–23)
CALCIUM SERPL-MCNC: 9.4 MG/DL — SIGNIFICANT CHANGE UP (ref 8.4–10.5)
CHLORIDE SERPL-SCNC: 105 MMOL/L — SIGNIFICANT CHANGE UP (ref 96–108)
CO2 SERPL-SCNC: 23 MMOL/L — SIGNIFICANT CHANGE UP (ref 22–31)
CREAT SERPL-MCNC: 0.29 MG/DL — LOW (ref 0.5–1.3)
GLUCOSE BLDC GLUCOMTR-MCNC: 90 MG/DL — SIGNIFICANT CHANGE UP (ref 70–99)
GLUCOSE SERPL-MCNC: 87 MG/DL — SIGNIFICANT CHANGE UP (ref 70–99)
HCT VFR BLD CALC: 34.6 % — SIGNIFICANT CHANGE UP (ref 34.5–45)
HCT VFR BLD CALC: 35.8 % — SIGNIFICANT CHANGE UP (ref 34.5–45)
HGB BLD-MCNC: 10.9 G/DL — LOW (ref 11.5–15.5)
HGB BLD-MCNC: 11.1 G/DL — LOW (ref 11.5–15.5)
MAGNESIUM SERPL-MCNC: 2 MG/DL — SIGNIFICANT CHANGE UP (ref 1.6–2.6)
MCHC RBC-ENTMCNC: 30.6 PG — SIGNIFICANT CHANGE UP (ref 27–34)
MCHC RBC-ENTMCNC: 31 GM/DL — LOW (ref 32–36)
MCHC RBC-ENTMCNC: 31 PG — SIGNIFICANT CHANGE UP (ref 27–34)
MCHC RBC-ENTMCNC: 31.5 GM/DL — LOW (ref 32–36)
MCV RBC AUTO: 98.3 FL — SIGNIFICANT CHANGE UP (ref 80–100)
MCV RBC AUTO: 98.6 FL — SIGNIFICANT CHANGE UP (ref 80–100)
NRBC # BLD: 0 /100 WBCS — SIGNIFICANT CHANGE UP (ref 0–0)
NRBC # BLD: 0 /100 WBCS — SIGNIFICANT CHANGE UP (ref 0–0)
PHOSPHATE SERPL-MCNC: 5.4 MG/DL — HIGH (ref 2.5–4.5)
PLATELET # BLD AUTO: 238 K/UL — SIGNIFICANT CHANGE UP (ref 150–400)
PLATELET # BLD AUTO: 256 K/UL — SIGNIFICANT CHANGE UP (ref 150–400)
POTASSIUM SERPL-MCNC: 4.2 MMOL/L — SIGNIFICANT CHANGE UP (ref 3.5–5.3)
POTASSIUM SERPL-SCNC: 4.2 MMOL/L — SIGNIFICANT CHANGE UP (ref 3.5–5.3)
PROT SERPL-MCNC: 6.5 G/DL — SIGNIFICANT CHANGE UP (ref 6–8.3)
RBC # BLD: 3.52 M/UL — LOW (ref 3.8–5.2)
RBC # BLD: 3.63 M/UL — LOW (ref 3.8–5.2)
RBC # FLD: 14.6 % — HIGH (ref 10.3–14.5)
RBC # FLD: 14.8 % — HIGH (ref 10.3–14.5)
SODIUM SERPL-SCNC: 139 MMOL/L — SIGNIFICANT CHANGE UP (ref 135–145)
WBC # BLD: 3.98 K/UL — SIGNIFICANT CHANGE UP (ref 3.8–10.5)
WBC # BLD: 5.24 K/UL — SIGNIFICANT CHANGE UP (ref 3.8–10.5)
WBC # FLD AUTO: 3.98 K/UL — SIGNIFICANT CHANGE UP (ref 3.8–10.5)
WBC # FLD AUTO: 5.24 K/UL — SIGNIFICANT CHANGE UP (ref 3.8–10.5)

## 2022-01-08 PROCEDURE — 99232 SBSQ HOSP IP/OBS MODERATE 35: CPT

## 2022-01-08 RX ORDER — DRONABINOL 2.5 MG
5 CAPSULE ORAL EVERY 12 HOURS
Refills: 0 | Status: DISCONTINUED | OUTPATIENT
Start: 2022-01-08 | End: 2022-01-13

## 2022-01-08 RX ORDER — ACETAMINOPHEN 500 MG
975 TABLET ORAL ONCE
Refills: 0 | Status: COMPLETED | OUTPATIENT
Start: 2022-01-08 | End: 2022-01-08

## 2022-01-08 RX ADMIN — Medication 20 MILLIGRAM(S): at 14:02

## 2022-01-08 RX ADMIN — Medication 20 MILLIGRAM(S): at 06:25

## 2022-01-08 RX ADMIN — Medication 25 MILLIGRAM(S): at 22:51

## 2022-01-08 RX ADMIN — ZINC SULFATE TAB 220 MG (50 MG ZINC EQUIVALENT) 220 MILLIGRAM(S): 220 (50 ZN) TAB at 11:56

## 2022-01-08 RX ADMIN — Medication 975 MILLIGRAM(S): at 02:25

## 2022-01-08 RX ADMIN — Medication 600 MILLIGRAM(S): at 00:05

## 2022-01-08 RX ADMIN — SENNA PLUS 2 TABLET(S): 8.6 TABLET ORAL at 22:50

## 2022-01-08 RX ADMIN — Medication 975 MILLIGRAM(S): at 03:16

## 2022-01-08 RX ADMIN — ENOXAPARIN SODIUM 40 MILLIGRAM(S): 100 INJECTION SUBCUTANEOUS at 22:50

## 2022-01-08 RX ADMIN — Medication 20 MILLIGRAM(S): at 22:50

## 2022-01-08 RX ADMIN — Medication 2.5 MILLIGRAM(S): at 11:55

## 2022-01-08 RX ADMIN — Medication 10 MILLIGRAM(S): at 22:50

## 2022-01-08 RX ADMIN — GABAPENTIN 300 MILLIGRAM(S): 400 CAPSULE ORAL at 06:25

## 2022-01-08 RX ADMIN — GABAPENTIN 300 MILLIGRAM(S): 400 CAPSULE ORAL at 22:50

## 2022-01-08 RX ADMIN — Medication 1 TABLET(S): at 11:56

## 2022-01-08 RX ADMIN — Medication 500 MILLIGRAM(S): at 11:56

## 2022-01-08 RX ADMIN — GABAPENTIN 300 MILLIGRAM(S): 400 CAPSULE ORAL at 14:02

## 2022-01-08 NOTE — PROGRESS NOTE ADULT - ASSESSMENT
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

## 2022-01-08 NOTE — PROGRESS NOTE ADULT - PROBLEM SELECTOR PLAN 7
- SW consult  - PT consulted, recommended home w/ 24/7 assist as PT is at baseline functional status  - nutrition consulted, recommended supplementing ensure plant-based 1 can TID, vit C, MV, and zinc

## 2022-01-08 NOTE — PROGRESS NOTE ADULT - PROBLEM SELECTOR PLAN 8
H/o neuropathy taking gabapentin, baclofen, and gabapentin at home. Also reports using medical marijuana or dronabinol 2.5 mg bid PRN for pain/anxiety.   - c/w home Gabapentin 300mg PO TID   - c/w home Amitryptiline 25mg PO daily  - c/w home Baclofen 20mg PO TID  - c/w dronabinol 5mg PO BID (increased from home dose)

## 2022-01-08 NOTE — PROGRESS NOTE ADULT - SUBJECTIVE AND OBJECTIVE BOX
OVERNIGHT EVENTS: Pt difficult to arouse this morning but was able to be woken, likely from lack of sleep the night prior. Increased dronabinol dose. BP slightly lower than baseline at 81 systolic but is asymptomatic.    SUBJECTIVE / INTERVAL HPI: Patient seen and examined at bedside. Pt reports increased hip pain overnight which resulted in feeling tired. Reports feeling well otherwise. Denies any fever, lightheadedness, headache, dizziness, changes in vision. Denies shortness of breath.    VITAL SIGNS:  Vital Signs Last 24 Hrs  T(C): 36.6 (08 Jan 2022 16:37), Max: 36.8 (08 Jan 2022 07:00)  T(F): 97.8 (08 Jan 2022 16:37), Max: 98.2 (08 Jan 2022 07:00)  HR: 64 (08 Jan 2022 16:37) (58 - 73)  BP: 120/86 (08 Jan 2022 16:37) (81/50 - 120/86)  BP(mean): --  RR: 17 (08 Jan 2022 16:37) (16 - 18)  SpO2: 97% (08 Jan 2022 16:37) (96% - 97%)    PHYSICAL EXAM:  Constitutional: thin, middle-aged female resting comfortably in bed, NAD  HEENT: PERRL, EOMI grossly, sclera anicteric, neck supple, no JVD, MMM, good dentition  Respiratory: CTA b/l; no wheezing, no rhonchi, no rales; unlabored respirations  Cardiovascular: RRR, normal S1, S2; +holosystolic murmur (pt confirms present for most of her life) no R/G  Gastrointestinal: soft, NT/ND; no masses palpable; BS normoactive x4 quadrants  : urostomy w/ clear yellow output  Extremities: Warm and well perfused; 2+ pulses equal bilateral lower extremities, R radial pulse 2+, L radial pulse palpable; no edema, cyanosis, or clubbing  Neurological: AOx3, CN II-XII grossly intact, marked atrophy of b/l LE>UE, unable to move b/l LE but able to move b/l UE   Skin: dressings covering pressure injuries on L wrist, R ankle, and sacrum    MEDICATIONS:  MEDICATIONS  (STANDING):  amitriptyline 25 milliGRAM(s) Oral at bedtime  ascorbic acid 500 milliGRAM(s) Oral daily  baclofen 20 milliGRAM(s) Oral three times a day  bisacodyl Suppository 10 milliGRAM(s) Rectal at bedtime  dronabinol 5 milliGRAM(s) Oral every 12 hours  enoxaparin Injectable 40 milliGRAM(s) SubCutaneous at bedtime  gabapentin 300 milliGRAM(s) Oral three times a day  influenza   Vaccine 0.5 milliLiter(s) IntraMuscular once  multivitamin 1 Tablet(s) Oral daily  senna 2 Tablet(s) Oral every 24 hours  zinc sulfate 220 milliGRAM(s) Oral daily    MEDICATIONS  (PRN):      ALLERGIES:  Allergies    No Known Allergies    Intolerances        LABS:                        11.1   3.98  )-----------( 256      ( 08 Jan 2022 10:49 )             35.8     01-08    139  |  105  |  13  ----------------------------<  87  4.2   |  23  |  0.29<L>    Ca    9.4      08 Jan 2022 10:49  Phos  5.4     01-08  Mg     2.0     01-08    TPro  6.5  /  Alb  3.5  /  TBili  0.3  /  DBili  x   /  AST  16  /  ALT  11  /  AlkPhos  98  01-08        CAPILLARY BLOOD GLUCOSE      POCT Blood Glucose.: 90 mg/dL (08 Jan 2022 08:16)      RADIOLOGY & ADDITIONAL TESTS: Reviewed.

## 2022-01-09 PROCEDURE — 99231 SBSQ HOSP IP/OBS SF/LOW 25: CPT | Mod: GC

## 2022-01-09 RX ORDER — MINERAL OIL
133 OIL (ML) MISCELLANEOUS ONCE
Refills: 0 | Status: COMPLETED | OUTPATIENT
Start: 2022-01-09 | End: 2022-01-09

## 2022-01-09 RX ADMIN — Medication 10 MILLIGRAM(S): at 22:58

## 2022-01-09 RX ADMIN — Medication 5 MILLIGRAM(S): at 22:55

## 2022-01-09 RX ADMIN — ENOXAPARIN SODIUM 40 MILLIGRAM(S): 100 INJECTION SUBCUTANEOUS at 22:56

## 2022-01-09 RX ADMIN — GABAPENTIN 300 MILLIGRAM(S): 400 CAPSULE ORAL at 07:02

## 2022-01-09 RX ADMIN — Medication 20 MILLIGRAM(S): at 22:56

## 2022-01-09 RX ADMIN — ZINC SULFATE TAB 220 MG (50 MG ZINC EQUIVALENT) 220 MILLIGRAM(S): 220 (50 ZN) TAB at 11:29

## 2022-01-09 RX ADMIN — GABAPENTIN 300 MILLIGRAM(S): 400 CAPSULE ORAL at 14:29

## 2022-01-09 RX ADMIN — SENNA PLUS 2 TABLET(S): 8.6 TABLET ORAL at 22:57

## 2022-01-09 RX ADMIN — GABAPENTIN 300 MILLIGRAM(S): 400 CAPSULE ORAL at 22:57

## 2022-01-09 RX ADMIN — Medication 1 TABLET(S): at 11:30

## 2022-01-09 RX ADMIN — Medication 20 MILLIGRAM(S): at 07:02

## 2022-01-09 RX ADMIN — Medication 25 MILLIGRAM(S): at 22:56

## 2022-01-09 RX ADMIN — Medication 5 MILLIGRAM(S): at 00:02

## 2022-01-09 RX ADMIN — Medication 500 MILLIGRAM(S): at 11:29

## 2022-01-09 RX ADMIN — Medication 5 MILLIGRAM(S): at 11:30

## 2022-01-09 RX ADMIN — Medication 20 MILLIGRAM(S): at 14:29

## 2022-01-09 RX ADMIN — Medication 133 MILLILITER(S): at 14:28

## 2022-01-09 NOTE — PROGRESS NOTE ADULT - ATTENDING COMMENTS
51F PMHx remote traumatic C4 quadriplegia (bedbound at baseline), dysreflexia, recently hospitalized for b/l PNA ([facility]) and discharged to rehab on 12/22, ARIANNA from [facility] rehab due to neglect, found to have new pressure injuries and mild starvation ketosis, admitted for nutrition, wound care.  Found to also have Ecoli and Klebsiella UTI s/p tx, now pending reinstatement of 24 HHA    Patient without new complaints today    Stable for dc with 24 hr HHA tomorrow, was unable to establish over weekend

## 2022-01-09 NOTE — PROGRESS NOTE ADULT - SUBJECTIVE AND OBJECTIVE BOX
OVERNIGHT EVENTS:  No acute events    SUBJECTIVE:  Patient seen and examined at bedside. Reports some chronic nerve pain.  Denies fever, chills, abd pain, N/V, CP, SOB    Vital Signs Last 12 Hrs  T(F): 97.7 (01-09-22 @ 15:49), Max: 97.7 (01-09-22 @ 15:49)  HR: 72 (01-09-22 @ 15:49) (58 - 72)  BP: 98/63 (01-09-22 @ 15:49) (92/61 - 98/63)  BP(mean): --  RR: 17 (01-09-22 @ 15:49) (16 - 18)  SpO2: 96% (01-09-22 @ 15:49) (95% - 97%)  I&O's Summary    08 Jan 2022 07:01  -  09 Jan 2022 07:00  --------------------------------------------------------  IN: 0 mL / OUT: 1405 mL / NET: -1405 mL    09 Jan 2022 07:01  -  09 Jan 2022 16:07  --------------------------------------------------------  IN: 0 mL / OUT: 100 mL / NET: -100 mL        PHYSICAL EXAM:  Constitutional: NAD, comfortable.  HEENT: NC/AT, PERRLA, EOMI, no conjunctival pallor or scleral icterus, MMM  Neck: Supple, no JVD  Respiratory: CTA B/L. No w/r/r.   Cardiovascular: RRR, normal S1 and S2, + holosystolic murmur   Gastrointestinal: +BS, soft NTND, no guarding or rebound tenderness, no palpable masses   Extremities: wwp; no cyanosis, clubbing or edema.   Vascular: Pulses equal and strong throughout.   Neurological: AAOx3, no CN deficits, 4+/5 UEs with atrophy, b/l LE paralyzed at baseline        LABS:                        11.1   3.98  )-----------( 256      ( 08 Jan 2022 10:49 )             35.8     01-08    139  |  105  |  13  ----------------------------<  87  4.2   |  23  |  0.29<L>    Ca    9.4      08 Jan 2022 10:49  Phos  5.4     01-08  Mg     2.0     01-08    TPro  6.5  /  Alb  3.5  /  TBili  0.3  /  DBili  x   /  AST  16  /  ALT  11  /  AlkPhos  98  01-08            RADIOLOGY & ADDITIONAL TESTS:  No new imaging    MEDICATIONS  (STANDING):  amitriptyline 25 milliGRAM(s) Oral at bedtime  ascorbic acid 500 milliGRAM(s) Oral daily  baclofen 20 milliGRAM(s) Oral three times a day  bisacodyl Suppository 10 milliGRAM(s) Rectal at bedtime  dronabinol 5 milliGRAM(s) Oral every 12 hours  enoxaparin Injectable 40 milliGRAM(s) SubCutaneous at bedtime  gabapentin 300 milliGRAM(s) Oral three times a day  influenza   Vaccine 0.5 milliLiter(s) IntraMuscular once  multivitamin 1 Tablet(s) Oral daily  senna 2 Tablet(s) Oral every 24 hours  zinc sulfate 220 milliGRAM(s) Oral daily    MEDICATIONS  (PRN):

## 2022-01-09 NOTE — PROGRESS NOTE ADULT - ASSESSMENT
51F PMHx remote traumatic C4 quadriplegia (bedbound at baseline), dysreflexia, recently hospitalized for b/l PNA ([facility]) and discharged to rehab on 12/22, ARIANNA from [facility] rehab due to neglect, found to have new pressure injuries and mild starvation ketosis, admitted for nutrition, wound care, and dispo planning.

## 2022-01-10 PROCEDURE — 99232 SBSQ HOSP IP/OBS MODERATE 35: CPT | Mod: GC

## 2022-01-10 RX ORDER — IBUPROFEN 200 MG
400 TABLET ORAL EVERY 8 HOURS
Refills: 0 | Status: DISCONTINUED | OUTPATIENT
Start: 2022-01-10 | End: 2022-01-14

## 2022-01-10 RX ORDER — MULTIVIT WITH MIN/MFOLATE/K2 340-15/3 G
1 POWDER (GRAM) ORAL ONCE
Refills: 0 | Status: COMPLETED | OUTPATIENT
Start: 2022-01-10 | End: 2022-01-10

## 2022-01-10 RX ADMIN — Medication 5 MILLIGRAM(S): at 12:11

## 2022-01-10 RX ADMIN — GABAPENTIN 300 MILLIGRAM(S): 400 CAPSULE ORAL at 14:37

## 2022-01-10 RX ADMIN — GABAPENTIN 300 MILLIGRAM(S): 400 CAPSULE ORAL at 22:58

## 2022-01-10 RX ADMIN — Medication 25 MILLIGRAM(S): at 22:58

## 2022-01-10 RX ADMIN — Medication 10 MILLIGRAM(S): at 22:59

## 2022-01-10 RX ADMIN — Medication 1 BOTTLE: at 22:59

## 2022-01-10 RX ADMIN — Medication 20 MILLIGRAM(S): at 22:58

## 2022-01-10 RX ADMIN — GABAPENTIN 300 MILLIGRAM(S): 400 CAPSULE ORAL at 06:57

## 2022-01-10 RX ADMIN — Medication 20 MILLIGRAM(S): at 14:37

## 2022-01-10 RX ADMIN — Medication 400 MILLIGRAM(S): at 17:46

## 2022-01-10 RX ADMIN — SENNA PLUS 2 TABLET(S): 8.6 TABLET ORAL at 22:57

## 2022-01-10 RX ADMIN — ZINC SULFATE TAB 220 MG (50 MG ZINC EQUIVALENT) 220 MILLIGRAM(S): 220 (50 ZN) TAB at 12:12

## 2022-01-10 RX ADMIN — Medication 20 MILLIGRAM(S): at 06:57

## 2022-01-10 RX ADMIN — Medication 1 TABLET(S): at 12:12

## 2022-01-10 RX ADMIN — Medication 400 MILLIGRAM(S): at 18:31

## 2022-01-10 RX ADMIN — Medication 500 MILLIGRAM(S): at 12:12

## 2022-01-10 RX ADMIN — ENOXAPARIN SODIUM 40 MILLIGRAM(S): 100 INJECTION SUBCUTANEOUS at 22:57

## 2022-01-10 NOTE — PROGRESS NOTE ADULT - ATTENDING COMMENTS
Patient discussed with resident team and plan of care reviewed. I have personally reviewed all pertinent labs and imaging and performed an independent history and physical. Resident note personally reviewed, and I agree with above resident note with the following additions:    51YOF with history of C4 quadriplegia, recent admission to [facility] for pneumonia, discharged to [facility], admitted with new pressure injuries and mild starvation ketosis. Also with UTI, UCx growing E coli and Klebsiella. Now awaiting reinstatement of 24hr HHA, as [facility] auth was denied by insurance.    No new issues today. Waiting on reinstatement of 24h HHA for safe d/c.

## 2022-01-10 NOTE — PROGRESS NOTE ADULT - ASSESSMENT
51F PMHx remote traumatic C4 quadriplegia (bedbound at baseline), dysreflexia, recently hospitalized for b/l PNA ([facility]) and discharged to rehab on 12/22, ARIANNA from [facility] rehab due to neglect, found to have new pressure injuries and mild starvation ketosis, admitted for nutrition, wound care, and dispo planning.

## 2022-01-10 NOTE — PROGRESS NOTE ADULT - SUBJECTIVE AND OBJECTIVE BOX
OVERNIGHT EVENTS: No acute events overnight.     SUBJECTIVE / INTERVAL HPI: Patient seen and examined at bedside. Pt has no new complaints. Reports pain improved with increased dronabinol dose. Pt denies any fever, chills, headache, shortness of breath, nausea, vomiting, diarrhea.     VITAL SIGNS:  Vital Signs Last 24 Hrs  T(C): 36.5 (10 Yousuf 2022 09:59), Max: 36.6 (10 Yousuf 2022 06:11)  T(F): 97.7 (10 Yousuf 2022 09:59), Max: 97.8 (10 Yousuf 2022 06:11)  HR: 61 (10 Yousuf 2022 09:59) (61 - 72)  BP: 110/73 (10 Yousuf 2022 09:59) (91/60 - 110/73)  BP(mean): --  RR: 16 (10 Yousuf 2022 09:59) (16 - 18)  SpO2: 98% (10 Yousuf 2022 09:59) (96% - 98%)    PHYSICAL EXAM:  Constitutional: NAD, comfortable.  HEENT: NC/AT, PERRLA, EOMI, no conjunctival pallor or scleral icterus, MMM  Neck: Supple, no JVD  Respiratory: CTA B/L. No w/r/r.   Cardiovascular: RRR, normal S1 and S2, + holosystolic murmur   Gastrointestinal: +BS, soft NTND, no guarding or rebound tenderness, no palpable masses   Extremities: wwp; no cyanosis, clubbing or edema.   Vascular: Pulses equal and strong throughout.   Neurological: AAOx3, no CN deficits, 4+/5 UEs with atrophy, b/l LE paralyzed at baseline    MEDICATIONS:  MEDICATIONS  (STANDING):  amitriptyline 25 milliGRAM(s) Oral at bedtime  ascorbic acid 500 milliGRAM(s) Oral daily  baclofen 20 milliGRAM(s) Oral three times a day  bisacodyl Suppository 10 milliGRAM(s) Rectal at bedtime  dronabinol 5 milliGRAM(s) Oral every 12 hours  enoxaparin Injectable 40 milliGRAM(s) SubCutaneous at bedtime  gabapentin 300 milliGRAM(s) Oral three times a day  influenza   Vaccine 0.5 milliLiter(s) IntraMuscular once  multivitamin 1 Tablet(s) Oral daily  senna 2 Tablet(s) Oral every 24 hours  zinc sulfate 220 milliGRAM(s) Oral daily    MEDICATIONS  (PRN):      ALLERGIES:  Allergies    No Known Allergies    Intolerances        LABS:              CAPILLARY BLOOD GLUCOSE          RADIOLOGY & ADDITIONAL TESTS: Reviewed. W Plasty Text: The lesion was extirpated to the level of the fat with a #15 scalpel blade.  Given the location of the defect, shape of the defect and the proximity to free margins a W-plasty was deemed most appropriate for repair.  Using a sterile surgical marker, the appropriate transposition arms of the W-plasty were drawn incorporating the defect and placing the expected incisions within the relaxed skin tension lines where possible.    The area thus outlined was incised deep to adipose tissue with a #15 scalpel blade.  The skin margins were undermined to an appropriate distance in all directions utilizing iris scissors.  The opposing transposition arms were then transposed into place in opposite direction and anchored with interrupted buried subcutaneous sutures.

## 2022-01-11 PROCEDURE — 99232 SBSQ HOSP IP/OBS MODERATE 35: CPT | Mod: GC

## 2022-01-11 RX ORDER — CYCLOBENZAPRINE HYDROCHLORIDE 10 MG/1
5 TABLET, FILM COATED ORAL ONCE
Refills: 0 | Status: COMPLETED | OUTPATIENT
Start: 2022-01-11 | End: 2022-01-11

## 2022-01-11 RX ADMIN — Medication 25 MILLIGRAM(S): at 22:06

## 2022-01-11 RX ADMIN — GABAPENTIN 300 MILLIGRAM(S): 400 CAPSULE ORAL at 13:51

## 2022-01-11 RX ADMIN — Medication 400 MILLIGRAM(S): at 02:57

## 2022-01-11 RX ADMIN — Medication 20 MILLIGRAM(S): at 06:44

## 2022-01-11 RX ADMIN — Medication 5 MILLIGRAM(S): at 22:06

## 2022-01-11 RX ADMIN — CYCLOBENZAPRINE HYDROCHLORIDE 5 MILLIGRAM(S): 10 TABLET, FILM COATED ORAL at 03:53

## 2022-01-11 RX ADMIN — Medication 5 MILLIGRAM(S): at 13:40

## 2022-01-11 RX ADMIN — SENNA PLUS 2 TABLET(S): 8.6 TABLET ORAL at 22:06

## 2022-01-11 RX ADMIN — Medication 20 MILLIGRAM(S): at 13:39

## 2022-01-11 RX ADMIN — ZINC SULFATE TAB 220 MG (50 MG ZINC EQUIVALENT) 220 MILLIGRAM(S): 220 (50 ZN) TAB at 13:40

## 2022-01-11 RX ADMIN — Medication 400 MILLIGRAM(S): at 13:51

## 2022-01-11 RX ADMIN — Medication 10 MILLIGRAM(S): at 23:38

## 2022-01-11 RX ADMIN — Medication 5 MILLIGRAM(S): at 01:41

## 2022-01-11 RX ADMIN — Medication 20 MILLIGRAM(S): at 22:06

## 2022-01-11 RX ADMIN — Medication 400 MILLIGRAM(S): at 14:51

## 2022-01-11 RX ADMIN — Medication 500 MILLIGRAM(S): at 13:40

## 2022-01-11 RX ADMIN — GABAPENTIN 300 MILLIGRAM(S): 400 CAPSULE ORAL at 22:06

## 2022-01-11 RX ADMIN — ENOXAPARIN SODIUM 40 MILLIGRAM(S): 100 INJECTION SUBCUTANEOUS at 22:05

## 2022-01-11 RX ADMIN — Medication 1 TABLET(S): at 13:39

## 2022-01-11 RX ADMIN — GABAPENTIN 300 MILLIGRAM(S): 400 CAPSULE ORAL at 06:44

## 2022-01-11 NOTE — PROGRESS NOTE ADULT - PROBLEM SELECTOR PLAN 7
- SW consult  - PT consulted, recommended home w/ 24/7 assist as PT is at baseline functional status  - nutrition consulted, recommended supplementing ensure plant-based 1 can TID, vit C, MV, and zinc written material

## 2022-01-11 NOTE — PROGRESS NOTE ADULT - ATTENDING COMMENTS
Patient discussed with resident team and plan of care reviewed. I have personally reviewed all pertinent labs and imaging and performed an independent history and physical. Resident note personally reviewed, and I agree with above resident note with the following additions:    51YOF with history of C4 quadriplegia, recent admission to [facility] for pneumonia, discharged to [facility], admitted with new pressure injuries and mild starvation ketosis. Also with UTI, UCx growing E coli and Klebsiella. Now awaiting reinstatement of 24hr HHA, as [facility] auth was denied by primary insurance.    No new issues today. Waiting on reinstatement of 24h HHA for safe d/c, vs SNF.

## 2022-01-11 NOTE — PROGRESS NOTE ADULT - ASSESSMENT
51F PMHx remote traumatic C4 quadriplegia (bedbound at baseline), dysreflexia, recently hospitalized for b/l PNA ([facility]) and discharged to rehab on 12/22, ARIANNA from [facility] rehab due to neglect, found to have new pressure injuries and mild starvation ketosis, admitted for nutrition, wound care, and dispo planning.

## 2022-01-11 NOTE — PROGRESS NOTE ADULT - SUBJECTIVE AND OBJECTIVE BOX
OVERNIGHT EVENTS: Pt reports back muscle spasm, given Flexeril with not much improvement.    SUBJECTIVE / INTERVAL HPI: Patient seen and examined at bedside. Pt denies any pain, pt reports back spasm still present but is bearable. Denies fever, shortness of breath, headache, dizziness.    VITAL SIGNS:  Vital Signs Last 24 Hrs  T(C): 36.8 (11 Jan 2022 09:40), Max: 36.8 (11 Jan 2022 09:40)  T(F): 98.2 (11 Jan 2022 09:40), Max: 98.2 (11 Jan 2022 09:40)  HR: 64 (11 Jan 2022 09:40) (63 - 73)  BP: 118/73 (11 Jan 2022 09:40) (106/63 - 121/80)  BP(mean): --  RR: 18 (11 Jan 2022 09:40) (16 - 18)  SpO2: 97% (11 Jan 2022 09:40) (96% - 97%)    PHYSICAL EXAM:  Constitutional: NAD, comfortable.  HEENT: NC/AT, PERRLA, EOMI, no conjunctival pallor or scleral icterus, MMM  Neck: Supple, no JVD  Respiratory: CTA B/L. No w/r/r.   Cardiovascular: RRR, normal S1 and S2, + holosystolic murmur   Gastrointestinal: +BS, soft NTND, no guarding or rebound tenderness, no palpable masses   Extremities: wwp; no cyanosis, clubbing or edema.   Vascular: Pulses equal and strong throughout.   Neurological: AAOx3, no CN deficits, 4+/5 UEs with atrophy, b/l LE paralyzed at baseline    MEDICATIONS:  MEDICATIONS  (STANDING):  amitriptyline 25 milliGRAM(s) Oral at bedtime  ascorbic acid 500 milliGRAM(s) Oral daily  baclofen 20 milliGRAM(s) Oral three times a day  bisacodyl Suppository 10 milliGRAM(s) Rectal at bedtime  dronabinol 5 milliGRAM(s) Oral every 12 hours  enoxaparin Injectable 40 milliGRAM(s) SubCutaneous at bedtime  gabapentin 300 milliGRAM(s) Oral three times a day  influenza   Vaccine 0.5 milliLiter(s) IntraMuscular once  multivitamin 1 Tablet(s) Oral daily  senna 2 Tablet(s) Oral every 24 hours  zinc sulfate 220 milliGRAM(s) Oral daily    MEDICATIONS  (PRN):  ibuprofen  Tablet. 400 milliGRAM(s) Oral every 8 hours PRN Moderate Pain (4 - 6)      ALLERGIES:  Allergies    No Known Allergies    Intolerances        LABS:              CAPILLARY BLOOD GLUCOSE          RADIOLOGY & ADDITIONAL TESTS: Reviewed.

## 2022-01-12 PROCEDURE — 99232 SBSQ HOSP IP/OBS MODERATE 35: CPT | Mod: GC

## 2022-01-12 RX ADMIN — GABAPENTIN 300 MILLIGRAM(S): 400 CAPSULE ORAL at 06:25

## 2022-01-12 RX ADMIN — Medication 10 MILLIGRAM(S): at 22:33

## 2022-01-12 RX ADMIN — Medication 1 TABLET(S): at 14:30

## 2022-01-12 RX ADMIN — Medication 25 MILLIGRAM(S): at 22:34

## 2022-01-12 RX ADMIN — GABAPENTIN 300 MILLIGRAM(S): 400 CAPSULE ORAL at 13:13

## 2022-01-12 RX ADMIN — Medication 20 MILLIGRAM(S): at 13:12

## 2022-01-12 RX ADMIN — Medication 20 MILLIGRAM(S): at 22:33

## 2022-01-12 RX ADMIN — Medication 500 MILLIGRAM(S): at 14:30

## 2022-01-12 RX ADMIN — GABAPENTIN 300 MILLIGRAM(S): 400 CAPSULE ORAL at 22:32

## 2022-01-12 RX ADMIN — ENOXAPARIN SODIUM 40 MILLIGRAM(S): 100 INJECTION SUBCUTANEOUS at 22:32

## 2022-01-12 RX ADMIN — ZINC SULFATE TAB 220 MG (50 MG ZINC EQUIVALENT) 220 MILLIGRAM(S): 220 (50 ZN) TAB at 14:30

## 2022-01-12 RX ADMIN — Medication 20 MILLIGRAM(S): at 06:26

## 2022-01-12 RX ADMIN — SENNA PLUS 2 TABLET(S): 8.6 TABLET ORAL at 22:32

## 2022-01-12 RX ADMIN — Medication 5 MILLIGRAM(S): at 13:13

## 2022-01-12 RX ADMIN — Medication 5 MILLIGRAM(S): at 22:33

## 2022-01-12 NOTE — PROGRESS NOTE ADULT - ATTENDING COMMENTS
Patient discussed with resident team and plan of care reviewed. I have personally reviewed all pertinent labs and imaging and performed an independent history and physical. Resident note personally reviewed, and I agree with above resident note with the following additions:    51YOF with history of C4 quadriplegia, recent admission to [facility] for pneumonia, discharged to [facility], admitted with new pressure injuries and mild starvation ketosis. Also with UTI, UCx growing E coli and Klebsiella. Now awaiting reinstatement of 24hr HHA, as [facility] auth was denied by primary insurance.    No new issues today. Continued d/c planning - per my discussion with SW, HHA agency may not be accepting, in which case only option is for SNF.

## 2022-01-12 NOTE — PROGRESS NOTE ADULT - SUBJECTIVE AND OBJECTIVE BOX
OVERNIGHT EVENTS: No acute events overnight.     SUBJECTIVE / INTERVAL HPI: Patient seen and examined at bedside. Pt does not report any new complaints. Denies fever, cough, pain.    VITAL SIGNS:  Vital Signs Last 24 Hrs  T(C): 36.4 (12 Jan 2022 16:14), Max: 36.4 (12 Jan 2022 16:14)  T(F): 97.5 (12 Jan 2022 16:14), Max: 97.5 (12 Jan 2022 16:14)  HR: 63 (12 Jan 2022 16:14) (60 - 68)  BP: 110/85 (12 Jan 2022 16:14) (66/46 - 150/90)  BP(mean): --  RR: 18 (12 Jan 2022 16:14) (16 - 18)  SpO2: 97% (12 Jan 2022 16:14) (96% - 97%)    PHYSICAL EXAM:  Constitutional: NAD, comfortable.  HEENT: NC/AT, PERRLA, EOMI, no conjunctival pallor or scleral icterus, MMM  Neck: Supple, no JVD  Respiratory: CTA B/L. No w/r/r.   Cardiovascular: RRR, normal S1 and S2, + holosystolic murmur   Gastrointestinal: +BS, soft NTND, no guarding or rebound tenderness, no palpable masses   Extremities: wwp; no cyanosis, clubbing or edema.   Vascular: Pulses equal and strong throughout.   Neurological: AAOx3, no CN deficits, 4+/5 UEs with atrophy, b/l LE paralyzed at baseline    MEDICATIONS:  MEDICATIONS  (STANDING):  amitriptyline 25 milliGRAM(s) Oral at bedtime  ascorbic acid 500 milliGRAM(s) Oral daily  baclofen 20 milliGRAM(s) Oral three times a day  bisacodyl Suppository 10 milliGRAM(s) Rectal at bedtime  dronabinol 5 milliGRAM(s) Oral every 12 hours  enoxaparin Injectable 40 milliGRAM(s) SubCutaneous at bedtime  gabapentin 300 milliGRAM(s) Oral three times a day  influenza   Vaccine 0.5 milliLiter(s) IntraMuscular once  multivitamin 1 Tablet(s) Oral daily  senna 2 Tablet(s) Oral every 24 hours  zinc sulfate 220 milliGRAM(s) Oral daily    MEDICATIONS  (PRN):  ibuprofen  Tablet. 400 milliGRAM(s) Oral every 8 hours PRN Moderate Pain (4 - 6)      ALLERGIES:  Allergies    No Known Allergies    Intolerances        LABS:              CAPILLARY BLOOD GLUCOSE          RADIOLOGY & ADDITIONAL TESTS: Reviewed.
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
## 2022-01-13 LAB — SARS-COV-2 RNA SPEC QL NAA+PROBE: SIGNIFICANT CHANGE UP

## 2022-01-13 PROCEDURE — 99232 SBSQ HOSP IP/OBS MODERATE 35: CPT | Mod: GC

## 2022-01-13 RX ORDER — ASCORBIC ACID 60 MG
1 TABLET,CHEWABLE ORAL
Qty: 0 | Refills: 0 | DISCHARGE
Start: 2022-01-13

## 2022-01-13 RX ORDER — ZINC SULFATE TAB 220 MG (50 MG ZINC EQUIVALENT) 220 (50 ZN) MG
1 TAB ORAL
Qty: 0 | Refills: 0 | DISCHARGE
Start: 2022-01-13

## 2022-01-13 RX ORDER — DRONABINOL 2.5 MG
5 CAPSULE ORAL EVERY 12 HOURS
Refills: 0 | Status: DISCONTINUED | OUTPATIENT
Start: 2022-01-13 | End: 2022-01-14

## 2022-01-13 RX ORDER — AMITRIPTYLINE HCL 25 MG
1 TABLET ORAL
Qty: 0 | Refills: 2 | DISCHARGE

## 2022-01-13 RX ORDER — GABAPENTIN 400 MG/1
0 CAPSULE ORAL
Qty: 0 | Refills: 0 | DISCHARGE

## 2022-01-13 RX ORDER — BACLOFEN 100 %
0 POWDER (GRAM) MISCELLANEOUS
Qty: 0 | Refills: 0 | DISCHARGE

## 2022-01-13 RX ORDER — AMITRIPTYLINE HCL 25 MG
0 TABLET ORAL
Qty: 0 | Refills: 2 | DISCHARGE

## 2022-01-13 RX ORDER — DRONABINOL 2.5 MG
1 CAPSULE ORAL
Qty: 0 | Refills: 0 | DISCHARGE
Start: 2022-01-13

## 2022-01-13 RX ORDER — POLYETHYLENE GLYCOL 3350 17 G/17G
17 POWDER, FOR SOLUTION ORAL DAILY
Refills: 0 | Status: DISCONTINUED | OUTPATIENT
Start: 2022-01-13 | End: 2022-01-14

## 2022-01-13 RX ORDER — BACLOFEN 100 %
1 POWDER (GRAM) MISCELLANEOUS
Qty: 0 | Refills: 0 | DISCHARGE

## 2022-01-13 RX ORDER — SENNA PLUS 8.6 MG/1
2 TABLET ORAL
Qty: 0 | Refills: 0 | DISCHARGE
Start: 2022-01-13

## 2022-01-13 RX ADMIN — GABAPENTIN 300 MILLIGRAM(S): 400 CAPSULE ORAL at 13:07

## 2022-01-13 RX ADMIN — POLYETHYLENE GLYCOL 3350 17 GRAM(S): 17 POWDER, FOR SOLUTION ORAL at 18:24

## 2022-01-13 RX ADMIN — ENOXAPARIN SODIUM 40 MILLIGRAM(S): 100 INJECTION SUBCUTANEOUS at 22:57

## 2022-01-13 RX ADMIN — Medication 1 TABLET(S): at 11:08

## 2022-01-13 RX ADMIN — Medication 400 MILLIGRAM(S): at 10:31

## 2022-01-13 RX ADMIN — SENNA PLUS 2 TABLET(S): 8.6 TABLET ORAL at 22:55

## 2022-01-13 RX ADMIN — Medication 20 MILLIGRAM(S): at 06:25

## 2022-01-13 RX ADMIN — Medication 20 MILLIGRAM(S): at 13:07

## 2022-01-13 RX ADMIN — Medication 500 MILLIGRAM(S): at 11:08

## 2022-01-13 RX ADMIN — Medication 10 MILLIGRAM(S): at 23:03

## 2022-01-13 RX ADMIN — Medication 400 MILLIGRAM(S): at 11:25

## 2022-01-13 RX ADMIN — Medication 20 MILLIGRAM(S): at 22:56

## 2022-01-13 RX ADMIN — GABAPENTIN 300 MILLIGRAM(S): 400 CAPSULE ORAL at 22:56

## 2022-01-13 RX ADMIN — Medication 25 MILLIGRAM(S): at 22:56

## 2022-01-13 RX ADMIN — ZINC SULFATE TAB 220 MG (50 MG ZINC EQUIVALENT) 220 MILLIGRAM(S): 220 (50 ZN) TAB at 11:08

## 2022-01-13 RX ADMIN — Medication 5 MILLIGRAM(S): at 18:23

## 2022-01-13 RX ADMIN — GABAPENTIN 300 MILLIGRAM(S): 400 CAPSULE ORAL at 06:25

## 2022-01-13 NOTE — PROGRESS NOTE ADULT - PROBLEM SELECTOR PROBLEM 9
Quadriplegia
Prophylactic measure
Prophylactic measure
Quadriplegia
Quadriplegia
Prophylactic measure
Quadriplegia
Prophylactic measure
Prophylactic measure

## 2022-01-13 NOTE — PROGRESS NOTE ADULT - ATTENDING COMMENTS
Patient discussed with resident team and plan of care reviewed. I have personally reviewed all pertinent labs and imaging and performed an independent history and physical. Resident note personally reviewed, and I agree with above resident note with the following additions:    51YOF with history of C4 quadriplegia, recent admission to [facility] for pneumonia, discharged to [facility], admitted with new pressure injuries and mild starvation ketosis. Also with UTI, UCx growing E coli and Klebsiella. Now awaiting reinstatement of 24hr HHA, as [facility] auth was denied by primary insurance.    Denied for HHA, only safe discharge plan is for [facility]. Medically ready for d/c. If patient declining [facility] unfortunately will need to give d/c notice.
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
## 2022-01-13 NOTE — PROGRESS NOTE ADULT - PROBLEM SELECTOR PLAN 10
F: s/p 1L NS bolus, s/p D5/LR 1L  E: Replete electrolytes as needed, maintain K>4, M>2  N: regular diet w/ ensure plant-based 1 can TID    DVT ppx: lovenox 40 q24h  GI ppx: None    Code status: FULL CODE    Dispo: [facility]
F: none  E: Replete electrolytes as needed, maintain K>4, M>2  N: regular diet w/ ensure plant-based 1 can TID    DVT ppx: lovenox 40 q24h  GI ppx: None    Code status: FULL CODE    Dispo: 24/7 HHA ([facility] auth rejected)
F: s/p 1L NS bolus, s/p D5/LR 1L  E: Replete electrolytes as needed, maintain K>4, M>2  N: regular diet w/ ensure plant-based 1 can TID    DVT ppx: lovenox 40 q24h  GI ppx: None    Code status: FULL CODE    Dispo: [facility]
F: s/p 1L NS bolus, s/p D5/LR 1L  E: Replete electrolytes as needed, maintain K>4, M>2  N: regular diet w/ ensure plant-based 1 can TID    DVT ppx: lovenox 40 q24h  GI ppx: None    Code status: FULL CODE    Dispo: [facility]
F: none  E: Replete electrolytes as needed, maintain K>4, M>2  N: regular diet w/ ensure plant-based 1 can TID    DVT ppx: lovenox 40 q24h  GI ppx: None    Code status: FULL CODE    Dispo: [facility] (HHA rejected, 24hr notice given 1/12 at 4PM)
F: s/p 1L NS bolus, s/p D5/LR 1L  E: Replete electrolytes as needed, maintain K>4, M>2  N: regular diet w/ ensure plant-based 1 can TID    DVT ppx: lovenox 40 q24h  GI ppx: None    Code status: FULL CODE    Dispo: 24/7 HHA ([facility] auth rejected)
F: none  E: Replete electrolytes as needed, maintain K>4, M>2  N: regular diet w/ ensure plant-based 1 can TID    DVT ppx: lovenox 40 q24h  GI ppx: None    Code status: FULL CODE    Dispo: 24/7 HHA ([facility] auth rejected)
F: s/p 1L NS bolus, s/p D5/LR 1L  E: Replete electrolytes as needed, maintain K>4, M>2  N: regular diet w/ ensure plant-based 1 can TID    DVT ppx: lovenox 40 q24h  GI ppx: None    Code status: FULL CODE    Dispo: [facility]
F: none  E: Replete electrolytes as needed, maintain K>4, M>2  N: regular diet w/ ensure plant-based 1 can TID    DVT ppx: lovenox 40 q24h  GI ppx: None    Code status: FULL CODE    Dispo: 24/7 HHA ([facility] auth rejected)
F: none  E: Replete electrolytes as needed, maintain K>4, M>2  N: regular diet w/ ensure plant-based 1 can TID    DVT ppx: lovenox 40 q24h  GI ppx: None    Code status: FULL CODE    Dispo: [facility] (HHA rejected, 24hr notice given 1/13)
F: s/p 1L NS bolus, s/p D5/LR 1L  E: Replete electrolytes as needed, maintain K>4, M>2  N: regular diet w/ ensure plant-based 1 can TID    DVT ppx: lovenox 40 q24h  GI ppx: None    Code status: FULL CODE    Dispo: 24/7 HHA ([facility] auth rejected)

## 2022-01-13 NOTE — PROGRESS NOTE ADULT - REASON FOR ADMISSION
rehab placement

## 2022-01-13 NOTE — PROGRESS NOTE ADULT - SUBJECTIVE AND OBJECTIVE BOX
OVERNIGHT EVENTS: No acute events overnight.     SUBJECTIVE / INTERVAL HPI: Patient seen and examined at bedside. Pt reported frustration at being sent back to prior [facility]. Pt open to looking at other [facility] options and is planning to appeal 24-hour notice. Denies any new physical complaints.    VITAL SIGNS:  Vital Signs Last 24 Hrs  T(C): 37 (13 Jan 2022 16:28), Max: 37 (13 Jan 2022 16:28)  T(F): 98.6 (13 Jan 2022 16:28), Max: 98.6 (13 Jan 2022 16:28)  HR: 69 (13 Jan 2022 21:25) (66 - 72)  BP: 95/63 (13 Jan 2022 21:25) (95/63 - 120/77)  BP(mean): --  RR: 17 (13 Jan 2022 21:25) (17 - 18)  SpO2: 98% (13 Jan 2022 21:25) (95% - 98%)    PHYSICAL EXAM:  Constitutional: NAD, comfortable.  HEENT: NC/AT, PERRLA, EOMI, no conjunctival pallor or scleral icterus, MMM  Neck: Supple, no JVD  Respiratory: CTA B/L. No w/r/r.   Cardiovascular: RRR, normal S1 and S2, + holosystolic murmur   Gastrointestinal: +BS, soft NTND, no guarding or rebound tenderness, no palpable masses   Extremities: wwp; no cyanosis, clubbing or edema.   Vascular: Pulses equal and strong throughout.   Neurological: AAOx3, no CN deficits, 4+/5 UEs with atrophy, b/l LE paralyzed at baseline    MEDICATIONS:  MEDICATIONS  (STANDING):  amitriptyline 25 milliGRAM(s) Oral at bedtime  ascorbic acid 500 milliGRAM(s) Oral daily  baclofen 20 milliGRAM(s) Oral three times a day  bisacodyl Suppository 10 milliGRAM(s) Rectal at bedtime  dronabinol 5 milliGRAM(s) Oral every 12 hours  enoxaparin Injectable 40 milliGRAM(s) SubCutaneous at bedtime  gabapentin 300 milliGRAM(s) Oral three times a day  influenza   Vaccine 0.5 milliLiter(s) IntraMuscular once  multivitamin 1 Tablet(s) Oral daily  polyethylene glycol 3350 17 Gram(s) Oral daily  senna 2 Tablet(s) Oral every 24 hours  zinc sulfate 220 milliGRAM(s) Oral daily    MEDICATIONS  (PRN):  ibuprofen  Tablet. 400 milliGRAM(s) Oral every 8 hours PRN Moderate Pain (4 - 6)      ALLERGIES:  Allergies    No Known Allergies    Intolerances        LABS:              CAPILLARY BLOOD GLUCOSE          RADIOLOGY & ADDITIONAL TESTS: Reviewed.
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
## 2022-01-14 ENCOUNTER — TRANSCRIPTION ENCOUNTER (OUTPATIENT)
Age: 52
End: 2022-01-14

## 2022-01-14 VITALS
TEMPERATURE: 97 F | RESPIRATION RATE: 15 BRPM | HEART RATE: 64 BPM | DIASTOLIC BLOOD PRESSURE: 60 MMHG | SYSTOLIC BLOOD PRESSURE: 109 MMHG | OXYGEN SATURATION: 98 %

## 2022-01-14 PROCEDURE — 82962 GLUCOSE BLOOD TEST: CPT

## 2022-01-14 PROCEDURE — U0003: CPT

## 2022-01-14 PROCEDURE — 36415 COLL VENOUS BLD VENIPUNCTURE: CPT

## 2022-01-14 PROCEDURE — 82010 KETONE BODYS QUAN: CPT

## 2022-01-14 PROCEDURE — 87186 SC STD MICRODIL/AGAR DIL: CPT

## 2022-01-14 PROCEDURE — 97112 NEUROMUSCULAR REEDUCATION: CPT

## 2022-01-14 PROCEDURE — 99239 HOSP IP/OBS DSCHRG MGMT >30: CPT | Mod: GC

## 2022-01-14 PROCEDURE — 80048 BASIC METABOLIC PNL TOTAL CA: CPT

## 2022-01-14 PROCEDURE — 99285 EMERGENCY DEPT VISIT HI MDM: CPT

## 2022-01-14 PROCEDURE — 87635 SARS-COV-2 COVID-19 AMP PRB: CPT

## 2022-01-14 PROCEDURE — U0005: CPT

## 2022-01-14 PROCEDURE — 81001 URINALYSIS AUTO W/SCOPE: CPT

## 2022-01-14 PROCEDURE — 97110 THERAPEUTIC EXERCISES: CPT

## 2022-01-14 PROCEDURE — 84100 ASSAY OF PHOSPHORUS: CPT

## 2022-01-14 PROCEDURE — 85027 COMPLETE CBC AUTOMATED: CPT

## 2022-01-14 PROCEDURE — 97530 THERAPEUTIC ACTIVITIES: CPT

## 2022-01-14 PROCEDURE — 97161 PT EVAL LOW COMPLEX 20 MIN: CPT

## 2022-01-14 PROCEDURE — 87086 URINE CULTURE/COLONY COUNT: CPT

## 2022-01-14 PROCEDURE — 85025 COMPLETE CBC W/AUTO DIFF WBC: CPT

## 2022-01-14 PROCEDURE — 83735 ASSAY OF MAGNESIUM: CPT

## 2022-01-14 PROCEDURE — 83605 ASSAY OF LACTIC ACID: CPT

## 2022-01-14 PROCEDURE — 80053 COMPREHEN METABOLIC PANEL: CPT

## 2022-01-14 PROCEDURE — 83036 HEMOGLOBIN GLYCOSYLATED A1C: CPT

## 2022-01-14 RX ORDER — POLYETHYLENE GLYCOL 3350 17 G/17G
17 POWDER, FOR SOLUTION ORAL
Qty: 0 | Refills: 0 | DISCHARGE
Start: 2022-01-14

## 2022-01-14 RX ADMIN — Medication 1 TABLET(S): at 09:56

## 2022-01-14 RX ADMIN — GABAPENTIN 300 MILLIGRAM(S): 400 CAPSULE ORAL at 14:25

## 2022-01-14 RX ADMIN — Medication 500 MILLIGRAM(S): at 10:23

## 2022-01-14 RX ADMIN — Medication 5 MILLIGRAM(S): at 09:56

## 2022-01-14 RX ADMIN — GABAPENTIN 300 MILLIGRAM(S): 400 CAPSULE ORAL at 07:04

## 2022-01-14 RX ADMIN — Medication 20 MILLIGRAM(S): at 07:04

## 2022-01-14 RX ADMIN — Medication 20 MILLIGRAM(S): at 14:24

## 2022-01-14 RX ADMIN — ZINC SULFATE TAB 220 MG (50 MG ZINC EQUIVALENT) 220 MILLIGRAM(S): 220 (50 ZN) TAB at 09:56

## 2022-01-14 NOTE — DISCHARGE NOTE NURSING/CASE MANAGEMENT/SOCIAL WORK - NSDCFUADDAPPT_GEN_ALL_CORE_FT
Please bring your Insurance card, Photo ID,  and Discharge paperwork to the following appointment:    (1) Please follow up with your Primary Care Provider, Nurse Practitioner, Kaley Mcelroy at [address] [place] [address], [address] [place] [address] on 01/14/2022 at 4:30pm.    Appointment was scheduled by Ms. JANEEN Farley, Referral Coordinator.

## 2022-01-14 NOTE — DISCHARGE NOTE NURSING/CASE MANAGEMENT/SOCIAL WORK - NSDCPEFALRISK_GEN_ALL_CORE
For information on Fall & Injury Prevention, visit: [email or web address] OR  [email or web address] OR  https://www.cdc.gov/steadi/patient.html

## 2022-01-14 NOTE — DISCHARGE NOTE NURSING/CASE MANAGEMENT/SOCIAL WORK - PATIENT PORTAL LINK FT
You can access the FollowMyHealth Patient Portal offered by [facility] by registering at the following website: [email or web address]. By joining [employer]’s FollowMyHealth portal, you will also be able to view your health information using other applications (apps) compatible with our system.
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
## 2022-01-20 DIAGNOSIS — B96.20 UNSPECIFIED ESCHERICHIA COLI [E. COLI] AS THE CAUSE OF DISEASES CLASSIFIED ELSEWHERE: ICD-10-CM

## 2022-01-20 DIAGNOSIS — T74.01XA ADULT NEGLECT OR ABANDONMENT, CONFIRMED, INITIAL ENCOUNTER: ICD-10-CM

## 2022-01-20 DIAGNOSIS — T42.4X5A ADVERSE EFFECT OF BENZODIAZEPINES, INITIAL ENCOUNTER: ICD-10-CM

## 2022-01-20 DIAGNOSIS — F12.90 CANNABIS USE, UNSPECIFIED, UNCOMPLICATED: ICD-10-CM

## 2022-01-20 DIAGNOSIS — T83.518A INFECTION AND INFLAMMATORY REACTION DUE TO OTHER URINARY CATHETER, INITIAL ENCOUNTER: ICD-10-CM

## 2022-01-20 DIAGNOSIS — T76.01XA ADULT NEGLECT OR ABANDONMENT, SUSPECTED, INITIAL ENCOUNTER: ICD-10-CM

## 2022-01-20 DIAGNOSIS — T49.4X5A ADVERSE EFFECT OF KERATOLYTICS, KERATOPLASTICS, AND OTHER HAIR TREATMENT DRUGS AND PREPARATIONS, INITIAL ENCOUNTER: ICD-10-CM
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
## 2022-01-20 DIAGNOSIS — T73.0XXA STARVATION, INITIAL ENCOUNTER: ICD-10-CM

## 2022-01-20 DIAGNOSIS — L89.514 PRESSURE ULCER OF RIGHT ANKLE, STAGE 4: ICD-10-CM

## 2022-01-20 DIAGNOSIS — L89.329 PRESSURE ULCER OF LEFT BUTTOCK, UNSPECIFIED STAGE: ICD-10-CM

## 2022-01-20 DIAGNOSIS — G62.9 POLYNEUROPATHY, UNSPECIFIED: ICD-10-CM

## 2022-01-20 DIAGNOSIS — D72.10 EOSINOPHILIA, UNSPECIFIED: ICD-10-CM

## 2022-01-20 DIAGNOSIS — Z87.01 PERSONAL HISTORY OF PNEUMONIA (RECURRENT): ICD-10-CM

## 2022-01-20 DIAGNOSIS — N39.0 URINARY TRACT INFECTION, SITE NOT SPECIFIED: ICD-10-CM

## 2022-01-20 DIAGNOSIS — G82.50 QUADRIPLEGIA, UNSPECIFIED: ICD-10-CM

## 2022-01-20 DIAGNOSIS — E88.89 OTHER SPECIFIED METABOLIC DISORDERS: ICD-10-CM

## 2022-01-20 DIAGNOSIS — B96.5 PSEUDOMONAS (AERUGINOSA) (MALLEI) (PSEUDOMALLEI) AS THE CAUSE OF DISEASES CLASSIFIED ELSEWHERE: ICD-10-CM

## 2022-01-20 DIAGNOSIS — Z20.822 CONTACT WITH AND (SUSPECTED) EXPOSURE TO COVID-19: ICD-10-CM
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
## 2022-01-20 DIAGNOSIS — L89.319 PRESSURE ULCER OF RIGHT BUTTOCK, UNSPECIFIED STAGE: ICD-10-CM

## 2022-01-31 ENCOUNTER — INPATIENT (INPATIENT)
Facility: HOSPITAL | Age: 52
LOS: 8 days | Discharge: NURSING, REHAB OR LONG-TERM CARE | DRG: 391 | End: 2022-02-09
Attending: INTERNAL MEDICINE
Payer: MEDICARE

## 2022-01-31 VITALS
HEIGHT: 67 IN | WEIGHT: 115.08 LBS | DIASTOLIC BLOOD PRESSURE: 66 MMHG | RESPIRATION RATE: 18 BRPM | TEMPERATURE: 98 F | OXYGEN SATURATION: 97 % | HEART RATE: 59 BPM | SYSTOLIC BLOOD PRESSURE: 99 MMHG

## 2022-01-31 LAB
ALBUMIN SERPL ELPH-MCNC: 3.9 G/DL — SIGNIFICANT CHANGE UP (ref 3.3–5)
ALP SERPL-CCNC: 231 U/L — HIGH (ref 40–120)
ALT FLD-CCNC: 9 U/L — LOW (ref 10–45)
ANION GAP SERPL CALC-SCNC: 11 MMOL/L — SIGNIFICANT CHANGE UP (ref 5–17)
APTT BLD: 25.8 SEC — LOW (ref 27.5–35.5)
AST SERPL-CCNC: 19 U/L — SIGNIFICANT CHANGE UP (ref 10–40)
BASOPHILS # BLD AUTO: 0.05 K/UL — SIGNIFICANT CHANGE UP (ref 0–0.2)
BASOPHILS NFR BLD AUTO: 0.8 % — SIGNIFICANT CHANGE UP (ref 0–2)
BILIRUB SERPL-MCNC: 0.2 MG/DL — SIGNIFICANT CHANGE UP (ref 0.2–1.2)
BUN SERPL-MCNC: 14 MG/DL — SIGNIFICANT CHANGE UP (ref 7–23)
CALCIUM SERPL-MCNC: 9.3 MG/DL — SIGNIFICANT CHANGE UP (ref 8.4–10.5)
CHLORIDE SERPL-SCNC: 102 MMOL/L — SIGNIFICANT CHANGE UP (ref 96–108)
CO2 SERPL-SCNC: 26 MMOL/L — SIGNIFICANT CHANGE UP (ref 22–31)
CREAT SERPL-MCNC: 0.25 MG/DL — LOW (ref 0.5–1.3)
EOSINOPHIL # BLD AUTO: 0.35 K/UL — SIGNIFICANT CHANGE UP (ref 0–0.5)
EOSINOPHIL NFR BLD AUTO: 5.7 % — SIGNIFICANT CHANGE UP (ref 0–6)
GLUCOSE SERPL-MCNC: 88 MG/DL — SIGNIFICANT CHANGE UP (ref 70–99)
HCT VFR BLD CALC: 36.1 % — SIGNIFICANT CHANGE UP (ref 34.5–45)
HGB BLD-MCNC: 11.6 G/DL — SIGNIFICANT CHANGE UP (ref 11.5–15.5)
IMM GRANULOCYTES NFR BLD AUTO: 0.2 % — SIGNIFICANT CHANGE UP (ref 0–1.5)
INR BLD: 0.9 — SIGNIFICANT CHANGE UP (ref 0.88–1.16)
LACTATE SERPL-SCNC: 1 MMOL/L — SIGNIFICANT CHANGE UP (ref 0.5–2)
LYMPHOCYTES # BLD AUTO: 1.7 K/UL — SIGNIFICANT CHANGE UP (ref 1–3.3)
LYMPHOCYTES # BLD AUTO: 27.9 % — SIGNIFICANT CHANGE UP (ref 13–44)
MCHC RBC-ENTMCNC: 30.9 PG — SIGNIFICANT CHANGE UP (ref 27–34)
MCHC RBC-ENTMCNC: 32.1 GM/DL — SIGNIFICANT CHANGE UP (ref 32–36)
MCV RBC AUTO: 96.3 FL — SIGNIFICANT CHANGE UP (ref 80–100)
MONOCYTES # BLD AUTO: 0.35 K/UL — SIGNIFICANT CHANGE UP (ref 0–0.9)
MONOCYTES NFR BLD AUTO: 5.7 % — SIGNIFICANT CHANGE UP (ref 2–14)
NEUTROPHILS # BLD AUTO: 3.64 K/UL — SIGNIFICANT CHANGE UP (ref 1.8–7.4)
NEUTROPHILS NFR BLD AUTO: 59.7 % — SIGNIFICANT CHANGE UP (ref 43–77)
NRBC # BLD: 0 /100 WBCS — SIGNIFICANT CHANGE UP (ref 0–0)
PLATELET # BLD AUTO: 229 K/UL — SIGNIFICANT CHANGE UP (ref 150–400)
POTASSIUM SERPL-MCNC: 4.4 MMOL/L — SIGNIFICANT CHANGE UP (ref 3.5–5.3)
POTASSIUM SERPL-SCNC: 4.4 MMOL/L — SIGNIFICANT CHANGE UP (ref 3.5–5.3)
PROT SERPL-MCNC: 6.7 G/DL — SIGNIFICANT CHANGE UP (ref 6–8.3)
PROTHROM AB SERPL-ACNC: 10.9 SEC — SIGNIFICANT CHANGE UP (ref 10.6–13.6)
RBC # BLD: 3.75 M/UL — LOW (ref 3.8–5.2)
RBC # FLD: 13.6 % — SIGNIFICANT CHANGE UP (ref 10.3–14.5)
SODIUM SERPL-SCNC: 139 MMOL/L — SIGNIFICANT CHANGE UP (ref 135–145)
WBC # BLD: 6.1 K/UL — SIGNIFICANT CHANGE UP (ref 3.8–10.5)
WBC # FLD AUTO: 6.1 K/UL — SIGNIFICANT CHANGE UP (ref 3.8–10.5)

## 2022-01-31 PROCEDURE — 99285 EMERGENCY DEPT VISIT HI MDM: CPT

## 2022-01-31 PROCEDURE — 74177 CT ABD & PELVIS W/CONTRAST: CPT | Mod: 26,MA

## 2022-01-31 RX ORDER — SODIUM CHLORIDE 9 MG/ML
1000 INJECTION INTRAMUSCULAR; INTRAVENOUS; SUBCUTANEOUS ONCE
Refills: 0 | Status: COMPLETED | OUTPATIENT
Start: 2022-01-31 | End: 2022-01-31

## 2022-01-31 RX ORDER — IOHEXOL 300 MG/ML
30 INJECTION, SOLUTION INTRAVENOUS ONCE
Refills: 0 | Status: COMPLETED | OUTPATIENT
Start: 2022-01-31 | End: 2022-01-31

## 2022-01-31 RX ORDER — LACTULOSE 10 G/15ML
20 SOLUTION ORAL ONCE
Refills: 0 | Status: COMPLETED | OUTPATIENT
Start: 2022-01-31 | End: 2022-01-31

## 2022-01-31 RX ADMIN — IOHEXOL 30 MILLILITER(S): 300 INJECTION, SOLUTION INTRAVENOUS at 20:40

## 2022-01-31 RX ADMIN — SODIUM CHLORIDE 1000 MILLILITER(S): 9 INJECTION INTRAMUSCULAR; INTRAVENOUS; SUBCUTANEOUS at 20:33

## 2022-01-31 RX ADMIN — LACTULOSE 20 GRAM(S): 10 SOLUTION ORAL at 20:33

## 2022-01-31 NOTE — ED PROVIDER NOTE - CLINICAL SUMMARY MEDICAL DECISION MAKING FREE TEXT BOX
50 y/o F pt with PMHx of quadriplegia, bladder resection, and SBO presents to ED with constipation and bloating x 4 days. Abd soft and nontender, but hard stool in rectal vault. Pt disimpacted, will get CT to r/o SBO.

## 2022-01-31 NOTE — ED ADULT NURSE NOTE - OBJECTIVE STATEMENT
52 y/o female c/o constipation x3 days and "I need to see a  so I can get a home health aid for more than 4 hours." As per patient, "I have a pressure sore on my right ankle and redness to my butt." pt denies n/v/d/fever.

## 2022-01-31 NOTE — ED ADULT NURSE NOTE - NS ED NURSE PRESS ULCER STAGE 12
Alert and oriented to person, place, time/situation. normal mood and affect. no apparent risk to self or others.
stage II

## 2022-01-31 NOTE — ED ADULT NURSE NOTE - NSIMPLEMENTINTERV_GEN_ALL_ED
Implemented All Fall with Harm Risk Interventions:  [place] to call system. Call bell, personal items and telephone within reach. Instruct patient to call for assistance. Room bathroom lighting operational. Non-slip footwear when patient is off stretcher. Physically safe environment: no spills, clutter or unnecessary equipment. Stretcher in lowest position, wheels locked, appropriate side rails in place. Provide visual cue, wrist band, yellow gown, etc. Monitor gait and stability. Monitor for mental status changes and reorient to person, place, and time. Review medications for side effects contributing to fall risk. Reinforce activity limits and safety measures with patient and family. Provide visual clues: red socks.

## 2022-01-31 NOTE — ED ADULT TRIAGE NOTE - CHIEF COMPLAINT QUOTE
Pt co constipation, LBM 3 days ago, reports minimal passing of gas. HX of urostomy and bladder resection. Denies CP, SOB.

## 2022-01-31 NOTE — ED PROVIDER NOTE - PHYSICAL EXAMINATION
CONSTITUTIONAL: Well-appearing; in no apparent distress.   HEAD: Normocephalic; atraumatic.   EYES:  conjunctiva and sclera clear  ENT: normal nose; no rhinorrhea; normal pharynx with no erythema or lesions.   NECK: Supple; non-tender;   CARDIOVASCULAR: Normal S1, S2; no murmurs, rubs, or gallops. Regular rate and rhythm.   RESPIRATORY: Breathing easily; breath sounds clear and equal bilaterally; no wheezes, rhonchi, or rales.  GI: Soft; non-distended; non-tender; no palpable organomegaly.   Rectal: Hard stool in ball, remove manually.   EXT: No cyanosis or edema; N/V intact  SKIN: Normal for age and race; warm; dry; good turgor; no apparent lesions or rash.   NEURO: A & O x 3; face symmetric; quadriplegic, some use of hands.   PSYCHOLOGICAL: The patient’s mood and manner are appropriate. CONSTITUTIONAL: Well-appearing; in no apparent distress.   HEAD: Normocephalic; atraumatic.   EYES:  conjunctiva and sclera clear  ENT: normal nose; no rhinorrhea; normal pharynx with no erythema or lesions.   NECK: Supple; non-tender;   CARDIOVASCULAR: Normal S1, S2; no murmurs, rubs, or gallops. Regular rate and rhythm.   RESPIRATORY: Breathing easily; breath sounds clear and equal bilaterally; no wheezes, rhonchi, or rales.  GI: Soft; non-distended; non-tender; no palpable organomegaly.   Rectal: 2 Hard golf ball sized stool balls remove manually.   EXT: No cyanosis or edema; N/V intact  SKIN: Normal for age and race; warm; dry; good turgor; no apparent lesions or rash.   NEURO: A & O x 3; face symmetric; quadriplegic, some use of hands.   PSYCHOLOGICAL: The patient’s mood and manner are appropriate.

## 2022-01-31 NOTE — ED PROVIDER NOTE - OBJECTIVE STATEMENT
50 y/o F pt with quadriplegia, bladder resection with urostomy, and SBO presents to ED c/o constipation x 4 days. Pt's last BM was 4 days ago and she normally goes every other day. Pt is unable to determine if she's passing gas secondary to quadriplegia, but admits to bloating. She denies nausea, vomiting, fever, chills, chest pain, shortness of breath, urinary symptoms, or any other acute complaints. Pt was recently at [facility] in Dec for pneumonia, was discharged to rehab and sent home on Friday, but no services were set up. She used to have home health aid. Over the weekend, her boyfriend was taking care of her, but she needs more services. 52 y/o F pt with quadriplegia, bladder resection with urostomy, and SBO presents to ED c/o constipation x 4 days. Pt's last BM was 4 days ago and she normally goes every other day. Pt is unable to determine if she's passing gas secondary to quadriplegia, but admits to bloating. She denies nausea, vomiting, fever, chills, chest pain, shortness of breath, urinary symptoms, or any other acute complaints. Pt was recently at [facility] in Dec for pneumonia, and admitted at [facility], was discharged to rehab and sent home on Friday, but states no services were set up. She used to have home health aid. Over the weekend, her boyfriend was taking care of her, but she needs more services.

## 2022-02-01 DIAGNOSIS — R63.8 OTHER SYMPTOMS AND SIGNS CONCERNING FOOD AND FLUID INTAKE: ICD-10-CM

## 2022-02-01 DIAGNOSIS — G62.9 POLYNEUROPATHY, UNSPECIFIED: ICD-10-CM

## 2022-02-01 DIAGNOSIS — Z98.890 OTHER SPECIFIED POSTPROCEDURAL STATES: Chronic | ICD-10-CM

## 2022-02-01 DIAGNOSIS — K59.00 CONSTIPATION, UNSPECIFIED: ICD-10-CM

## 2022-02-01 DIAGNOSIS — R74.8 ABNORMAL LEVELS OF OTHER SERUM ENZYMES: ICD-10-CM

## 2022-02-01 DIAGNOSIS — L89.90 PRESSURE ULCER OF UNSPECIFIED SITE, UNSPECIFIED STAGE: ICD-10-CM

## 2022-02-01 DIAGNOSIS — G82.50 QUADRIPLEGIA, UNSPECIFIED: ICD-10-CM

## 2022-02-01 LAB
24R-OH-CALCIDIOL SERPL-MCNC: 48.2 NG/ML — SIGNIFICANT CHANGE UP (ref 30–80)
ANION GAP SERPL CALC-SCNC: 10 MMOL/L — SIGNIFICANT CHANGE UP (ref 5–17)
BUN SERPL-MCNC: 14 MG/DL — SIGNIFICANT CHANGE UP (ref 7–23)
CALCIUM SERPL-MCNC: 8.8 MG/DL — SIGNIFICANT CHANGE UP (ref 8.4–10.5)
CHLORIDE SERPL-SCNC: 106 MMOL/L — SIGNIFICANT CHANGE UP (ref 96–108)
CO2 SERPL-SCNC: 22 MMOL/L — SIGNIFICANT CHANGE UP (ref 22–31)
CREAT SERPL-MCNC: 0.4 MG/DL — LOW (ref 0.5–1.3)
GGT SERPL-CCNC: 13 U/L — SIGNIFICANT CHANGE UP (ref 8–40)
GLUCOSE SERPL-MCNC: 95 MG/DL — SIGNIFICANT CHANGE UP (ref 70–99)
HCT VFR BLD CALC: 35.9 % — SIGNIFICANT CHANGE UP (ref 34.5–45)
HGB BLD-MCNC: 10.8 G/DL — LOW (ref 11.5–15.5)
MAGNESIUM SERPL-MCNC: 1.9 MG/DL — SIGNIFICANT CHANGE UP (ref 1.6–2.6)
MCHC RBC-ENTMCNC: 30.1 GM/DL — LOW (ref 32–36)
MCHC RBC-ENTMCNC: 30.3 PG — SIGNIFICANT CHANGE UP (ref 27–34)
MCV RBC AUTO: 100.8 FL — HIGH (ref 80–100)
NRBC # BLD: 0 /100 WBCS — SIGNIFICANT CHANGE UP (ref 0–0)
PHOSPHATE SERPL-MCNC: 4.6 MG/DL — HIGH (ref 2.5–4.5)
PLATELET # BLD AUTO: 210 K/UL — SIGNIFICANT CHANGE UP (ref 150–400)
POTASSIUM SERPL-MCNC: SIGNIFICANT CHANGE UP MMOL/L (ref 3.5–5.3)
POTASSIUM SERPL-SCNC: SIGNIFICANT CHANGE UP MMOL/L (ref 3.5–5.3)
RBC # BLD: 3.56 M/UL — LOW (ref 3.8–5.2)
RBC # FLD: 13.7 % — SIGNIFICANT CHANGE UP (ref 10.3–14.5)
SARS-COV-2 RNA SPEC QL NAA+PROBE: SIGNIFICANT CHANGE UP
SODIUM SERPL-SCNC: 138 MMOL/L — SIGNIFICANT CHANGE UP (ref 135–145)
WBC # BLD: 5.27 K/UL — SIGNIFICANT CHANGE UP (ref 3.8–10.5)
WBC # FLD AUTO: 5.27 K/UL — SIGNIFICANT CHANGE UP (ref 3.8–10.5)

## 2022-02-01 PROCEDURE — 99221 1ST HOSP IP/OBS SF/LOW 40: CPT | Mod: GC

## 2022-02-01 RX ORDER — BACLOFEN 100 %
20 POWDER (GRAM) MISCELLANEOUS EVERY 8 HOURS
Refills: 0 | Status: DISCONTINUED | OUTPATIENT
Start: 2022-02-01 | End: 2022-02-07

## 2022-02-01 RX ORDER — INFLUENZA VIRUS VACCINE 15; 15; 15; 15 UG/.5ML; UG/.5ML; UG/.5ML; UG/.5ML
0.5 SUSPENSION INTRAMUSCULAR ONCE
Refills: 0 | Status: DISCONTINUED | OUTPATIENT
Start: 2022-02-01 | End: 2022-02-09

## 2022-02-01 RX ORDER — ENOXAPARIN SODIUM 100 MG/ML
40 INJECTION SUBCUTANEOUS EVERY 24 HOURS
Refills: 0 | Status: DISCONTINUED | OUTPATIENT
Start: 2022-02-01 | End: 2022-02-09

## 2022-02-01 RX ORDER — IBUPROFEN 200 MG
200 TABLET ORAL ONCE
Refills: 0 | Status: COMPLETED | OUTPATIENT
Start: 2022-02-01 | End: 2022-02-01

## 2022-02-01 RX ORDER — DRONABINOL 2.5 MG
5 CAPSULE ORAL EVERY 12 HOURS
Refills: 0 | Status: DISCONTINUED | OUTPATIENT
Start: 2022-02-01 | End: 2022-02-03

## 2022-02-01 RX ORDER — SENNA PLUS 8.6 MG/1
2 TABLET ORAL AT BEDTIME
Refills: 0 | Status: DISCONTINUED | OUTPATIENT
Start: 2022-02-01 | End: 2022-02-09

## 2022-02-01 RX ORDER — AMITRIPTYLINE HCL 25 MG
25 TABLET ORAL EVERY 24 HOURS
Refills: 0 | Status: DISCONTINUED | OUTPATIENT
Start: 2022-02-01 | End: 2022-02-09

## 2022-02-01 RX ORDER — GABAPENTIN 400 MG/1
300 CAPSULE ORAL EVERY 8 HOURS
Refills: 0 | Status: DISCONTINUED | OUTPATIENT
Start: 2022-02-01 | End: 2022-02-06

## 2022-02-01 RX ORDER — POLYETHYLENE GLYCOL 3350 17 G/17G
17 POWDER, FOR SOLUTION ORAL EVERY 12 HOURS
Refills: 0 | Status: DISCONTINUED | OUTPATIENT
Start: 2022-02-01 | End: 2022-02-09

## 2022-02-01 RX ORDER — ZINC SULFATE TAB 220 MG (50 MG ZINC EQUIVALENT) 220 (50 ZN) MG
220 TAB ORAL DAILY
Refills: 0 | Status: DISCONTINUED | OUTPATIENT
Start: 2022-02-01 | End: 2022-02-09

## 2022-02-01 RX ADMIN — Medication 5 MILLIGRAM(S): at 05:50

## 2022-02-01 RX ADMIN — Medication 5 MILLIGRAM(S): at 22:16

## 2022-02-01 RX ADMIN — SENNA PLUS 2 TABLET(S): 8.6 TABLET ORAL at 22:11

## 2022-02-01 RX ADMIN — ZINC SULFATE TAB 220 MG (50 MG ZINC EQUIVALENT) 220 MILLIGRAM(S): 220 (50 ZN) TAB at 13:37

## 2022-02-01 RX ADMIN — Medication 25 MILLIGRAM(S): at 05:49

## 2022-02-01 RX ADMIN — Medication 20 MILLIGRAM(S): at 09:38

## 2022-02-01 RX ADMIN — Medication 20 MILLIGRAM(S): at 02:26

## 2022-02-01 RX ADMIN — GABAPENTIN 300 MILLIGRAM(S): 400 CAPSULE ORAL at 09:38

## 2022-02-01 RX ADMIN — Medication 200 MILLIGRAM(S): at 22:11

## 2022-02-01 RX ADMIN — Medication 10 MILLIGRAM(S): at 05:50

## 2022-02-01 RX ADMIN — POLYETHYLENE GLYCOL 3350 17 GRAM(S): 17 POWDER, FOR SOLUTION ORAL at 17:47

## 2022-02-01 RX ADMIN — GABAPENTIN 300 MILLIGRAM(S): 400 CAPSULE ORAL at 14:16

## 2022-02-01 RX ADMIN — GABAPENTIN 300 MILLIGRAM(S): 400 CAPSULE ORAL at 01:12

## 2022-02-01 RX ADMIN — Medication 20 MILLIGRAM(S): at 17:20

## 2022-02-01 RX ADMIN — GABAPENTIN 300 MILLIGRAM(S): 400 CAPSULE ORAL at 22:11

## 2022-02-01 NOTE — DIETITIAN INITIAL EVALUATION ADULT. - ADD RECOMMEND
1. Change diet to a high fiber diet 2. Provide Ensure plant-based 8oz TID (580kcal/60g protein) 3. Monitor weekly weights 4. Monitor PO intake/hydration status 5. Monitor Bowel regimen 1. Change diet from regular to a high fiber diet 2. Provide Ensure plant-based 8oz TID (580kcal/60g protein) 3. Monitor weekly weights 4. Monitor PO intake/hydration status 5. Monitor Bowel regimen 6. Honor food preferences

## 2022-02-01 NOTE — H&P ADULT - PROBLEM SELECTOR PLAN 6
F: tolerating PO, no IVF  E: replete K<4, Mg<2  N: regular  VTE Prophylaxis: Lovenox 40 Q24H  D: RMF

## 2022-02-01 NOTE — H&P ADULT - NSHPLABSRESULTS_GEN_ALL_CORE
.  LABS                        11.6   6.10  )-----------( 229      ( 31 Jan 2022 20:10 )             36.1     01-31    139  |  102  |  14  ----------------------------<  88  4.4   |  26  |  0.25<L>    Ca    9.3      31 Jan 2022 20:53    TPro  6.7  /  Alb  3.9  /  TBili  0.2  /  DBili  x   /  AST  19  /  ALT  9<L>  /  AlkPhos  231<H>  01-31    PT/INR - ( 31 Jan 2022 20:10 )   PT: 10.9 sec;   INR: 0.90          PTT - ( 31 Jan 2022 20:10 )  PTT:25.8 sec          RADIOLOGY & ADDITIONAL TESTS: Reviewed

## 2022-02-01 NOTE — H&P ADULT - NSHPPHYSICALEXAM_GEN_ALL_CORE
Constitutional: NAD, resting comfortably in bed  Head: NC/AT  Eyes: PERRL, EOMI, anicteric sclera  ENT: no nasal discharge; uvula midline, no oropharyngeal erythema or exudates; MMM  Neck: supple; no JVD   Respiratory: CTA B/L; no W/R/R  Cardiac: +S1/S2; RRR; no M/R/G  Gastrointestinal: soft, NT/ND; no rebound or guarding; +BSx4  Genitourinary: urostomy bag in place, draining clear yellow urine  Extremities: WWP, no clubbing or cyanosis; no peripheral edema  Musculoskeletal: 0/5 strength testing of lower extremities, patient reports cannot wiggle toes; 3/5 UE flexion; 1+/5 UE extension  Vascular: 2+ radial, femoral, DP/PT pulses B/L  Dermatologic: skin warm and dry  Neurologic: AAOx3; CNII-XII grossly intact; reports intact sensation on all 4 extremities  Psychiatric: affect and characteristics of appearance, verbalizations, behaviors are appropriate Constitutional: NAD, resting comfortably in bed  Head: NC/AT  Eyes: PERRL, EOMI, anicteric sclera  ENT: no nasal discharge; uvula midline, no oropharyngeal erythema or exudates; MMM  Neck: supple; no JVD   Respiratory: CTA B/L; no W/R/R  Cardiac: +S1/S2; RRR; no M/R/G  Gastrointestinal: mildly distended; no rebound or guarding; +BSx4  Genitourinary: urostomy bag in place, draining clear yellow urine  Extremities: WWP, no clubbing or cyanosis; no peripheral edema  Musculoskeletal: 0/5 strength testing of lower extremities, patient reports cannot wiggle toes; 3/5 UE flexion; 1+/5 UE extension  Vascular: 2+ radial, femoral, DP/PT pulses B/L  Dermatologic: skin warm and dry  Neurologic: AAOx3; CNII-XII grossly intact; reports intact sensation on all 4 extremities  Psychiatric: affect and characteristics of appearance, verbalizations, behaviors are appropriate

## 2022-02-01 NOTE — DIETITIAN NUTRITION RISK NOTIFICATION - TREATMENT: THE FOLLOWING DIET HAS BEEN RECOMMENDED
Diet, Regular:   High Fiber (HIFIBER)  Supplement Feeding Modality:  Oral  Ensure Plant-Based Cans or Servings Per Day:  1       Frequency:  Three Times a day (02-01-22 @ 14:39) [Pending Verification By Attending]  Diet, Regular (02-01-22 @ 01:02) [Active]
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
## 2022-02-01 NOTE — DIETITIAN INITIAL EVALUATION ADULT. - PERTINENT LABORATORY DATA
RBC: 3.56 (L)   HGB: 10.8 (L)   MCV: 100.8 (H)   Na: 138  K: 4.4  BUN: 14   Cr: 0.40   Glu: 95   Phos: 4.6  Alb: 3.9

## 2022-02-01 NOTE — DIETITIAN INITIAL EVALUATION ADULT. - PROBLEM SELECTOR PLAN 1
Last BM was 4 days ago and she normally goes every other day. Patient is unable to determine if she's passing gas secondary to quadriplegia, but admits to bloating. She denies nausea, vomiting, abdominal pain. CT imaging did reveal extensive stool burden throughout large bowel. No evidence of mechanical bowel obstruction.   - s/p fecal disimpaction with reported 2 large golf ball stools removed  - s/p lactulose 20g x1   - c/w miralax BID  - c/w senna 2tabs qhs  - PRN fleet enemas

## 2022-02-01 NOTE — DIETITIAN INITIAL EVALUATION ADULT. - PROBLEM SELECTOR PLAN 2
- c/w home Gabapentin 300mg PO TID   - c/w home amitriptyline 25mg PO daily once QTc confirmed within normal limits  - c/w home Baclofen 20mg PO TID  - c/w dronabinol 5mg PO BID

## 2022-02-01 NOTE — DIETITIAN INITIAL EVALUATION ADULT. - PERTINENT MEDS FT
Enoxaparin   amitriptyline   Baclofen   Dronabinol   Gabapentin   Polyethylene Glycol   Senna   Zinc

## 2022-02-01 NOTE — H&P ADULT - PROBLEM SELECTOR PLAN 5
with LFTs and bilirubin within normal limits.   - f/up GGT  with LFTs and bilirubin within normal limits. Patient without any abdominal pain.   - f/up GGT wnl  with LFTs and bilirubin within normal limits. Patient without any abdominal pain.   - GGT wnl so nonhepatic origin  - f/up vitamin D level

## 2022-02-01 NOTE — DIETITIAN INITIAL EVALUATION ADULT. - PROBLEM SELECTOR PLAN 5
with LFTs and bilirubin within normal limits. Patient without any abdominal pain.   - GGT wnl so nonhepatic origin  - f/up vitamin D level

## 2022-02-01 NOTE — PATIENT PROFILE ADULT - FALL HARM RISK - HARM RISK INTERVENTIONS

## 2022-02-01 NOTE — H&P ADULT - NSHPSOCIALHISTORY_GEN_ALL_CORE
quadriplegia at baseline, gets around with motorized vehicle. Admits to smoking marijuana on a daily basis. Denies other tobacco product use.
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
## 2022-02-01 NOTE — H&P ADULT - PROBLEM SELECTOR PLAN 1
Last BM was 4 days ago and she normally goes every other day. Patient is unable to determine if she's passing gas secondary to quadriplegia, but admits to bloating. She denies nausea, vomiting, abdominal pain. CT imaging did reveal extensive stool burden throughout large bowel. No evidence of mechanical bowel obstruction.   - s/p fecal disimpaction with reported 2 large golf ball stools removed  - s/p lactulose 20g x1   - c/w miralax BID  - c/w senna 2tabs qhs Last BM was 4 days ago and she normally goes every other day. Patient is unable to determine if she's passing gas secondary to quadriplegia, but admits to bloating. She denies nausea, vomiting, abdominal pain. CT imaging did reveal extensive stool burden throughout large bowel. No evidence of mechanical bowel obstruction.   - s/p fecal disimpaction with reported 2 large golf ball stools removed  - s/p lactulose 20g x1   - c/w miralax BID  - c/w senna 2tabs qhs  - PRN fleet enemas
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
## 2022-02-01 NOTE — DIETITIAN INITIAL EVALUATION ADULT. - OTHER CALCULATIONS
ABW used to calculate estimated needs since %IBW is between % (85%). Estimated needs based on [facility] standards of care. Adjusted for quadriplegia and pressure injury.
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
## 2022-02-01 NOTE — DIETITIAN NUTRITION RISK NOTIFICATION - ADDITIONAL COMMENTS/DIETITIAN RECOMMENDATIONS
1. Change diet from regular to a high fiber diet 2. Provide Ensure plant-based 8oz TID (580kcal/60g protein) 3. Monitor weekly weights 4. Monitor PO intake/hydration status 5. Monitor Bowel regimen 6. Honor food preferences

## 2022-02-02 LAB
ANION GAP SERPL CALC-SCNC: 11 MMOL/L — SIGNIFICANT CHANGE UP (ref 5–17)
BASOPHILS # BLD AUTO: 0.03 K/UL — SIGNIFICANT CHANGE UP (ref 0–0.2)
BASOPHILS NFR BLD AUTO: 0.4 % — SIGNIFICANT CHANGE UP (ref 0–2)
BUN SERPL-MCNC: 16 MG/DL — SIGNIFICANT CHANGE UP (ref 7–23)
CALCIUM SERPL-MCNC: 10 MG/DL — SIGNIFICANT CHANGE UP (ref 8.4–10.5)
CHLORIDE SERPL-SCNC: 104 MMOL/L — SIGNIFICANT CHANGE UP (ref 96–108)
CO2 SERPL-SCNC: 23 MMOL/L — SIGNIFICANT CHANGE UP (ref 22–31)
CREAT SERPL-MCNC: 0.6 MG/DL — SIGNIFICANT CHANGE UP (ref 0.5–1.3)
EOSINOPHIL # BLD AUTO: 0.09 K/UL — SIGNIFICANT CHANGE UP (ref 0–0.5)
EOSINOPHIL NFR BLD AUTO: 1.2 % — SIGNIFICANT CHANGE UP (ref 0–6)
GLUCOSE SERPL-MCNC: 143 MG/DL — HIGH (ref 70–99)
HCT VFR BLD CALC: 42.1 % — SIGNIFICANT CHANGE UP (ref 34.5–45)
HGB BLD-MCNC: 12.9 G/DL — SIGNIFICANT CHANGE UP (ref 11.5–15.5)
IMM GRANULOCYTES NFR BLD AUTO: 0.3 % — SIGNIFICANT CHANGE UP (ref 0–1.5)
LYMPHOCYTES # BLD AUTO: 3.14 K/UL — SIGNIFICANT CHANGE UP (ref 1–3.3)
LYMPHOCYTES # BLD AUTO: 43.6 % — SIGNIFICANT CHANGE UP (ref 13–44)
MAGNESIUM SERPL-MCNC: 1.9 MG/DL — SIGNIFICANT CHANGE UP (ref 1.6–2.6)
MCHC RBC-ENTMCNC: 27.6 PG — SIGNIFICANT CHANGE UP (ref 27–34)
MCHC RBC-ENTMCNC: 30.6 GM/DL — LOW (ref 32–36)
MCV RBC AUTO: 90.1 FL — SIGNIFICANT CHANGE UP (ref 80–100)
MONOCYTES # BLD AUTO: 0.53 K/UL — SIGNIFICANT CHANGE UP (ref 0–0.9)
MONOCYTES NFR BLD AUTO: 7.4 % — SIGNIFICANT CHANGE UP (ref 2–14)
NEUTROPHILS # BLD AUTO: 3.4 K/UL — SIGNIFICANT CHANGE UP (ref 1.8–7.4)
NEUTROPHILS NFR BLD AUTO: 47.1 % — SIGNIFICANT CHANGE UP (ref 43–77)
NRBC # BLD: 0 /100 WBCS — SIGNIFICANT CHANGE UP (ref 0–0)
PHOSPHATE SERPL-MCNC: 3.1 MG/DL — SIGNIFICANT CHANGE UP (ref 2.5–4.5)
PLATELET # BLD AUTO: 249 K/UL — SIGNIFICANT CHANGE UP (ref 150–400)
POTASSIUM SERPL-MCNC: 4.4 MMOL/L — SIGNIFICANT CHANGE UP (ref 3.5–5.3)
POTASSIUM SERPL-SCNC: 4.4 MMOL/L — SIGNIFICANT CHANGE UP (ref 3.5–5.3)
RBC # BLD: 4.67 M/UL — SIGNIFICANT CHANGE UP (ref 3.8–5.2)
RBC # FLD: 14.3 % — SIGNIFICANT CHANGE UP (ref 10.3–14.5)
SODIUM SERPL-SCNC: 138 MMOL/L — SIGNIFICANT CHANGE UP (ref 135–145)
WBC # BLD: 7.21 K/UL — SIGNIFICANT CHANGE UP (ref 3.8–10.5)
WBC # FLD AUTO: 7.21 K/UL — SIGNIFICANT CHANGE UP (ref 3.8–10.5)

## 2022-02-02 PROCEDURE — 99232 SBSQ HOSP IP/OBS MODERATE 35: CPT

## 2022-02-02 RX ORDER — MAGNESIUM SULFATE 500 MG/ML
2 VIAL (ML) INJECTION ONCE
Refills: 0 | Status: COMPLETED | OUTPATIENT
Start: 2022-02-02 | End: 2022-02-02

## 2022-02-02 RX ADMIN — POLYETHYLENE GLYCOL 3350 17 GRAM(S): 17 POWDER, FOR SOLUTION ORAL at 18:29

## 2022-02-02 RX ADMIN — ZINC SULFATE TAB 220 MG (50 MG ZINC EQUIVALENT) 220 MILLIGRAM(S): 220 (50 ZN) TAB at 12:55

## 2022-02-02 RX ADMIN — SENNA PLUS 2 TABLET(S): 8.6 TABLET ORAL at 23:12

## 2022-02-02 RX ADMIN — Medication 200 MILLIGRAM(S): at 00:07

## 2022-02-02 RX ADMIN — ENOXAPARIN SODIUM 40 MILLIGRAM(S): 100 INJECTION SUBCUTANEOUS at 06:02

## 2022-02-02 RX ADMIN — Medication 5 MILLIGRAM(S): at 18:29

## 2022-02-02 RX ADMIN — Medication 5 MILLIGRAM(S): at 12:55

## 2022-02-02 RX ADMIN — Medication 20 MILLIGRAM(S): at 12:55

## 2022-02-02 RX ADMIN — Medication 20 MILLIGRAM(S): at 18:29

## 2022-02-02 RX ADMIN — Medication 10 MILLIGRAM(S): at 18:29

## 2022-02-02 RX ADMIN — GABAPENTIN 300 MILLIGRAM(S): 400 CAPSULE ORAL at 06:02

## 2022-02-02 RX ADMIN — Medication 20 MILLIGRAM(S): at 01:02

## 2022-02-02 RX ADMIN — Medication 25 GRAM(S): at 23:12

## 2022-02-02 RX ADMIN — GABAPENTIN 300 MILLIGRAM(S): 400 CAPSULE ORAL at 18:32

## 2022-02-02 RX ADMIN — Medication 25 MILLIGRAM(S): at 06:02

## 2022-02-02 NOTE — PHYSICAL THERAPY INITIAL EVALUATION ADULT - ADDITIONAL COMMENTS
Pt currently resides alone in elevator apartment. Bedbound "for many years". Had 24/7 caregiver for 2 years until recently "cancelled service", which brought her admission to rehab. Caregiver assisted w/ home management, meals, cleaning, cooking, bathing, dressing, and transfers onto wheelchair. Pt. comes from rehab. Pt states she transfers via macho lift or scoots, states she uses built up utensils and needs assist with ADL.
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
## 2022-02-03 LAB
ANION GAP SERPL CALC-SCNC: 9 MMOL/L — SIGNIFICANT CHANGE UP (ref 5–17)
BASOPHILS # BLD AUTO: 0.04 K/UL — SIGNIFICANT CHANGE UP (ref 0–0.2)
BASOPHILS NFR BLD AUTO: 0.7 % — SIGNIFICANT CHANGE UP (ref 0–2)
BUN SERPL-MCNC: 12 MG/DL — SIGNIFICANT CHANGE UP (ref 7–23)
CALCIUM SERPL-MCNC: 8.8 MG/DL — SIGNIFICANT CHANGE UP (ref 8.4–10.5)
CHLORIDE SERPL-SCNC: 103 MMOL/L — SIGNIFICANT CHANGE UP (ref 96–108)
CO2 SERPL-SCNC: 25 MMOL/L — SIGNIFICANT CHANGE UP (ref 22–31)
CREAT SERPL-MCNC: 0.28 MG/DL — LOW (ref 0.5–1.3)
EOSINOPHIL # BLD AUTO: 0.25 K/UL — SIGNIFICANT CHANGE UP (ref 0–0.5)
EOSINOPHIL NFR BLD AUTO: 4.1 % — SIGNIFICANT CHANGE UP (ref 0–6)
GLUCOSE SERPL-MCNC: 92 MG/DL — SIGNIFICANT CHANGE UP (ref 70–99)
HCT VFR BLD CALC: 33.8 % — LOW (ref 34.5–45)
HGB BLD-MCNC: 10.6 G/DL — LOW (ref 11.5–15.5)
IMM GRANULOCYTES NFR BLD AUTO: 0.3 % — SIGNIFICANT CHANGE UP (ref 0–1.5)
LYMPHOCYTES # BLD AUTO: 1.16 K/UL — SIGNIFICANT CHANGE UP (ref 1–3.3)
LYMPHOCYTES # BLD AUTO: 19.1 % — SIGNIFICANT CHANGE UP (ref 13–44)
MAGNESIUM SERPL-MCNC: 2.2 MG/DL — SIGNIFICANT CHANGE UP (ref 1.6–2.6)
MCHC RBC-ENTMCNC: 30.5 PG — SIGNIFICANT CHANGE UP (ref 27–34)
MCHC RBC-ENTMCNC: 31.4 GM/DL — LOW (ref 32–36)
MCV RBC AUTO: 97.4 FL — SIGNIFICANT CHANGE UP (ref 80–100)
MONOCYTES # BLD AUTO: 0.47 K/UL — SIGNIFICANT CHANGE UP (ref 0–0.9)
MONOCYTES NFR BLD AUTO: 7.8 % — SIGNIFICANT CHANGE UP (ref 2–14)
NEUTROPHILS # BLD AUTO: 4.12 K/UL — SIGNIFICANT CHANGE UP (ref 1.8–7.4)
NEUTROPHILS NFR BLD AUTO: 68 % — SIGNIFICANT CHANGE UP (ref 43–77)
NRBC # BLD: 0 /100 WBCS — SIGNIFICANT CHANGE UP (ref 0–0)
PHOSPHATE SERPL-MCNC: 4.5 MG/DL — SIGNIFICANT CHANGE UP (ref 2.5–4.5)
PLATELET # BLD AUTO: 231 K/UL — SIGNIFICANT CHANGE UP (ref 150–400)
POTASSIUM SERPL-MCNC: 3.9 MMOL/L — SIGNIFICANT CHANGE UP (ref 3.5–5.3)
POTASSIUM SERPL-SCNC: 3.9 MMOL/L — SIGNIFICANT CHANGE UP (ref 3.5–5.3)
RBC # BLD: 3.47 M/UL — LOW (ref 3.8–5.2)
RBC # FLD: 14 % — SIGNIFICANT CHANGE UP (ref 10.3–14.5)
SODIUM SERPL-SCNC: 137 MMOL/L — SIGNIFICANT CHANGE UP (ref 135–145)
WBC # BLD: 6.06 K/UL — SIGNIFICANT CHANGE UP (ref 3.8–10.5)
WBC # FLD AUTO: 6.06 K/UL — SIGNIFICANT CHANGE UP (ref 3.8–10.5)

## 2022-02-03 PROCEDURE — 99232 SBSQ HOSP IP/OBS MODERATE 35: CPT | Mod: GC

## 2022-02-03 RX ORDER — CYCLOBENZAPRINE HYDROCHLORIDE 10 MG/1
5 TABLET, FILM COATED ORAL THREE TIMES A DAY
Refills: 0 | Status: DISCONTINUED | OUTPATIENT
Start: 2022-02-03 | End: 2022-02-09

## 2022-02-03 RX ORDER — DRONABINOL 2.5 MG
5 CAPSULE ORAL EVERY 12 HOURS
Refills: 0 | Status: DISCONTINUED | OUTPATIENT
Start: 2022-02-03 | End: 2022-02-08

## 2022-02-03 RX ADMIN — Medication 20 MILLIGRAM(S): at 10:21

## 2022-02-03 RX ADMIN — ZINC SULFATE TAB 220 MG (50 MG ZINC EQUIVALENT) 220 MILLIGRAM(S): 220 (50 ZN) TAB at 12:11

## 2022-02-03 RX ADMIN — Medication 5 MILLIGRAM(S): at 21:53

## 2022-02-03 RX ADMIN — Medication 5 MILLIGRAM(S): at 06:14

## 2022-02-03 RX ADMIN — Medication 20 MILLIGRAM(S): at 03:28

## 2022-02-03 RX ADMIN — GABAPENTIN 300 MILLIGRAM(S): 400 CAPSULE ORAL at 21:53

## 2022-02-03 RX ADMIN — GABAPENTIN 300 MILLIGRAM(S): 400 CAPSULE ORAL at 03:28

## 2022-02-03 RX ADMIN — ENOXAPARIN SODIUM 40 MILLIGRAM(S): 100 INJECTION SUBCUTANEOUS at 06:14

## 2022-02-03 RX ADMIN — GABAPENTIN 300 MILLIGRAM(S): 400 CAPSULE ORAL at 10:21

## 2022-02-03 RX ADMIN — GABAPENTIN 300 MILLIGRAM(S): 400 CAPSULE ORAL at 17:26

## 2022-02-03 RX ADMIN — Medication 20 MILLIGRAM(S): at 17:26

## 2022-02-03 RX ADMIN — Medication 25 MILLIGRAM(S): at 06:14

## 2022-02-03 RX ADMIN — SENNA PLUS 2 TABLET(S): 8.6 TABLET ORAL at 21:53

## 2022-02-03 NOTE — OCCUPATIONAL THERAPY INITIAL EVALUATION ADULT - ADDITIONAL COMMENTS
Pt currently resides alone in elevator apartment. Bedbound "for many years". Had 24/7 caregiver for 2 years until recently "cancelled service", which brought her admission to rehab and then pt states she was sent home with only partial caregivers. Caregiver assisted w/ home management, meals, cleaning, cooking, bathing, dressing, and transfers onto wheelchair. Pt has a hospital bed, states she transfers via macho lift or scoots. Pt states that caregivers sponge bathe her in bed, she is able to brush her teeth and feed self using universal cuff with setup. Pt currently resides alone in an apartment with elevator access. Bedbound "for many years", had 24/7 caregiver for 2 years until recently "cancelled service", which brought her admission to rehab and then pt states she was sent home with caregivers for only a couple of hours. Caregivers assisted w/ home management, meals, cleaning, cooking, bathing, dressing, and transfers onto wheelchair. Pt has a hospital bed, states she transfers via macho lift or scoots. Pt states that caregivers sponge bathe her in bed, she is able to brush her teeth and feed self using universal cuff with setup.

## 2022-02-03 NOTE — OCCUPATIONAL THERAPY INITIAL EVALUATION ADULT - GENERAL OBSERVATIONS, REHAB EVAL
Pt received semi-supine in bed, +heplock, +urostomy, in NAD and agreeable to OT. Cleared by DANICA Le to see.

## 2022-02-03 NOTE — OCCUPATIONAL THERAPY INITIAL EVALUATION ADULT - RANGE OF MOTION EXAMINATION, UPPER EXTREMITY
Pt able to actively perform BUE shoulder flexion to ~90 deg and demo elbow flexion WFL/bilateral UE Passive ROM was WFL  (within functional limits)

## 2022-02-03 NOTE — OCCUPATIONAL THERAPY INITIAL EVALUATION ADULT - LEVEL OF INDEPENDENCE: GROOMING, OT EVAL
with trunk supported at EOB, pt able to brush teeth with setup using RUE and universal cuff/supervision

## 2022-02-03 NOTE — OCCUPATIONAL THERAPY INITIAL EVALUATION ADULT - MANUAL MUSCLE TESTING RESULTS, REHAB EVAL
BUE strength 0/5 throughout with exception of elbow flexion ~3/5, shoulder flexion/extension ~3-/5. BLE 0/5 throughout
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
## 2022-02-03 NOTE — OCCUPATIONAL THERAPY INITIAL EVALUATION ADULT - MODALITIES TREATMENT COMMENTS
Pt tolerated sitting EOB x15 minutes with mod-max A 2/2 poor trunk control. Pt is a quadriplegic, able to demonstrate some shoulder and elbow movements to complete grooming task with universal cuff. Pt is at baseline as she receives 24/7 care for ADLs and dependent via macho lift for transfers. No further acute OT needs, discussed case with MELBA Blackburn and MD Kim. Pt will be d/c from OT at this time.
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
## 2022-02-04 PROCEDURE — 99232 SBSQ HOSP IP/OBS MODERATE 35: CPT | Mod: GC

## 2022-02-04 RX ORDER — NYSTATIN CREAM 100000 [USP'U]/G
1 CREAM TOPICAL
Refills: 0 | Status: DISCONTINUED | OUTPATIENT
Start: 2022-02-04 | End: 2022-02-09

## 2022-02-04 RX ORDER — IBUPROFEN 200 MG
400 TABLET ORAL ONCE
Refills: 0 | Status: COMPLETED | OUTPATIENT
Start: 2022-02-04 | End: 2022-02-04

## 2022-02-04 RX ADMIN — GABAPENTIN 300 MILLIGRAM(S): 400 CAPSULE ORAL at 16:00

## 2022-02-04 RX ADMIN — ZINC SULFATE TAB 220 MG (50 MG ZINC EQUIVALENT) 220 MILLIGRAM(S): 220 (50 ZN) TAB at 13:43

## 2022-02-04 RX ADMIN — Medication 25 MILLIGRAM(S): at 06:33

## 2022-02-04 RX ADMIN — GABAPENTIN 300 MILLIGRAM(S): 400 CAPSULE ORAL at 06:33

## 2022-02-04 RX ADMIN — GABAPENTIN 300 MILLIGRAM(S): 400 CAPSULE ORAL at 21:49

## 2022-02-04 RX ADMIN — Medication 20 MILLIGRAM(S): at 11:05

## 2022-02-04 RX ADMIN — NYSTATIN CREAM 1 APPLICATION(S): 100000 CREAM TOPICAL at 19:40

## 2022-02-04 RX ADMIN — SENNA PLUS 2 TABLET(S): 8.6 TABLET ORAL at 21:49

## 2022-02-04 RX ADMIN — Medication 5 MILLIGRAM(S): at 11:06

## 2022-02-04 RX ADMIN — Medication 20 MILLIGRAM(S): at 19:04

## 2022-02-04 RX ADMIN — ENOXAPARIN SODIUM 40 MILLIGRAM(S): 100 INJECTION SUBCUTANEOUS at 06:34

## 2022-02-04 RX ADMIN — CYCLOBENZAPRINE HYDROCHLORIDE 5 MILLIGRAM(S): 10 TABLET, FILM COATED ORAL at 02:42

## 2022-02-04 RX ADMIN — Medication 5 MILLIGRAM(S): at 21:53

## 2022-02-04 RX ADMIN — Medication 10 MILLIGRAM(S): at 21:50

## 2022-02-04 RX ADMIN — Medication 20 MILLIGRAM(S): at 00:43
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
## 2022-02-04 NOTE — ADVANCED PRACTICE NURSE CONSULT - ASSESSMENT
Patient with a right lateral malleolus stage-3 pressure injury with 10% slough and 90% pink, hypergranulated tissue. Wound edges are floating with undermining from 6-12o'clock with greatest length at 9o'clock measuring 0.4cm. Periwound with blanchable erythema, with some satellite lesions to periphery and peeling to erythema edges, consistent with fungal infection. Drainage from wound  is moderate, serosanguineous, and without odor. Cleansed with NS. Applied Cavilon to periwound. Applied Aquacel AG to wound bed. Covered with foam; awaiting RX for nystatin powder for periwound. DANICA Le instructed on applying to periwound when it arrives.    To bilateral buttocks, perineum, inner thighs, patient with what appears to be fungal cutaneous infection; presents as solid erythema, blanchable, peeling dry skin to edges of erythema and multiple satellite lesions to periphery. Over buttocks patient has superficial erosion in several areas. Skin cleansed with NS, pat dried, Critic Aid Clear Moisture Barrier with Miconazole 2% applied to all skin exposed to urine/stool. Discussed care with MD Birch. DANICA Le present for wound care.

## 2022-02-04 NOTE — ADVANCED PRACTICE NURSE CONSULT - RECOMMEDATIONS
Right lateral malleolus stage-3: Cleanse with NS. apply Nystatin powder to periwound and blot over with Cavilon to seal in medication. Apply Aquacel AG to wound bed. Cover with foam. Change every other day.    Buttocks/perineum/groin/thighs fungal rash 2/2 IAD: Cleanse with bath wipes and bedside cleanser. Pat dry. Apply Critic Aid Clear Antifungal Moisture Barrier. Apply twice daily and after every episode of incontinence.

## 2022-02-05 PROCEDURE — 99232 SBSQ HOSP IP/OBS MODERATE 35: CPT | Mod: GC

## 2022-02-05 RX ORDER — LANOLIN ALCOHOL/MO/W.PET/CERES
5 CREAM (GRAM) TOPICAL AT BEDTIME
Refills: 0 | Status: DISCONTINUED | OUTPATIENT
Start: 2022-02-05 | End: 2022-02-09

## 2022-02-05 RX ADMIN — Medication 400 MILLIGRAM(S): at 00:02

## 2022-02-05 RX ADMIN — ZINC SULFATE TAB 220 MG (50 MG ZINC EQUIVALENT) 220 MILLIGRAM(S): 220 (50 ZN) TAB at 11:27

## 2022-02-05 RX ADMIN — GABAPENTIN 300 MILLIGRAM(S): 400 CAPSULE ORAL at 13:17

## 2022-02-05 RX ADMIN — SENNA PLUS 2 TABLET(S): 8.6 TABLET ORAL at 21:45

## 2022-02-05 RX ADMIN — Medication 25 MILLIGRAM(S): at 07:07

## 2022-02-05 RX ADMIN — NYSTATIN CREAM 1 APPLICATION(S): 100000 CREAM TOPICAL at 18:43

## 2022-02-05 RX ADMIN — Medication 10 MILLIGRAM(S): at 21:44

## 2022-02-05 RX ADMIN — GABAPENTIN 300 MILLIGRAM(S): 400 CAPSULE ORAL at 07:08

## 2022-02-05 RX ADMIN — POLYETHYLENE GLYCOL 3350 17 GRAM(S): 17 POWDER, FOR SOLUTION ORAL at 18:42

## 2022-02-05 RX ADMIN — Medication 20 MILLIGRAM(S): at 00:02

## 2022-02-05 RX ADMIN — Medication 400 MILLIGRAM(S): at 01:02

## 2022-02-05 RX ADMIN — Medication 20 MILLIGRAM(S): at 18:42

## 2022-02-05 RX ADMIN — ENOXAPARIN SODIUM 40 MILLIGRAM(S): 100 INJECTION SUBCUTANEOUS at 07:08

## 2022-02-05 RX ADMIN — Medication 5 MILLIGRAM(S): at 21:45

## 2022-02-05 RX ADMIN — Medication 20 MILLIGRAM(S): at 09:46

## 2022-02-05 RX ADMIN — GABAPENTIN 300 MILLIGRAM(S): 400 CAPSULE ORAL at 21:44

## 2022-02-06 PROCEDURE — 99232 SBSQ HOSP IP/OBS MODERATE 35: CPT | Mod: GC

## 2022-02-06 RX ORDER — GABAPENTIN 400 MG/1
300 CAPSULE ORAL EVERY 8 HOURS
Refills: 0 | Status: DISCONTINUED | OUTPATIENT
Start: 2022-02-06 | End: 2022-02-09

## 2022-02-06 RX ADMIN — Medication 5 MILLIGRAM(S): at 22:58

## 2022-02-06 RX ADMIN — Medication 25 MILLIGRAM(S): at 05:20

## 2022-02-06 RX ADMIN — Medication 20 MILLIGRAM(S): at 01:10

## 2022-02-06 RX ADMIN — SENNA PLUS 2 TABLET(S): 8.6 TABLET ORAL at 22:58

## 2022-02-06 RX ADMIN — Medication 10 MILLIGRAM(S): at 22:57

## 2022-02-06 RX ADMIN — GABAPENTIN 300 MILLIGRAM(S): 400 CAPSULE ORAL at 05:21

## 2022-02-06 RX ADMIN — POLYETHYLENE GLYCOL 3350 17 GRAM(S): 17 POWDER, FOR SOLUTION ORAL at 02:37

## 2022-02-06 RX ADMIN — ENOXAPARIN SODIUM 40 MILLIGRAM(S): 100 INJECTION SUBCUTANEOUS at 05:21

## 2022-02-06 RX ADMIN — ZINC SULFATE TAB 220 MG (50 MG ZINC EQUIVALENT) 220 MILLIGRAM(S): 220 (50 ZN) TAB at 11:35

## 2022-02-06 RX ADMIN — Medication 5 MILLIGRAM(S): at 01:10

## 2022-02-06 RX ADMIN — GABAPENTIN 300 MILLIGRAM(S): 400 CAPSULE ORAL at 22:58

## 2022-02-06 RX ADMIN — Medication 20 MILLIGRAM(S): at 09:21

## 2022-02-06 RX ADMIN — POLYETHYLENE GLYCOL 3350 17 GRAM(S): 17 POWDER, FOR SOLUTION ORAL at 17:41

## 2022-02-06 RX ADMIN — Medication 20 MILLIGRAM(S): at 17:41

## 2022-02-06 RX ADMIN — NYSTATIN CREAM 1 APPLICATION(S): 100000 CREAM TOPICAL at 17:42

## 2022-02-06 RX ADMIN — CYCLOBENZAPRINE HYDROCHLORIDE 5 MILLIGRAM(S): 10 TABLET, FILM COATED ORAL at 04:22

## 2022-02-06 RX ADMIN — GABAPENTIN 300 MILLIGRAM(S): 400 CAPSULE ORAL at 13:08

## 2022-02-07 LAB — SARS-COV-2 RNA SPEC QL NAA+PROBE: SIGNIFICANT CHANGE UP

## 2022-02-07 PROCEDURE — 99232 SBSQ HOSP IP/OBS MODERATE 35: CPT | Mod: GC

## 2022-02-07 RX ORDER — BACLOFEN 100 %
20 POWDER (GRAM) MISCELLANEOUS
Refills: 0 | Status: DISCONTINUED | OUTPATIENT
Start: 2022-02-07 | End: 2022-02-09

## 2022-02-07 RX ADMIN — Medication 20 MILLIGRAM(S): at 01:22

## 2022-02-07 RX ADMIN — GABAPENTIN 300 MILLIGRAM(S): 400 CAPSULE ORAL at 13:26

## 2022-02-07 RX ADMIN — Medication 20 MILLIGRAM(S): at 21:29

## 2022-02-07 RX ADMIN — GABAPENTIN 300 MILLIGRAM(S): 400 CAPSULE ORAL at 21:30

## 2022-02-07 RX ADMIN — Medication 10 MILLIGRAM(S): at 21:29

## 2022-02-07 RX ADMIN — NYSTATIN CREAM 1 APPLICATION(S): 100000 CREAM TOPICAL at 17:31

## 2022-02-07 RX ADMIN — POLYETHYLENE GLYCOL 3350 17 GRAM(S): 17 POWDER, FOR SOLUTION ORAL at 05:52

## 2022-02-07 RX ADMIN — POLYETHYLENE GLYCOL 3350 17 GRAM(S): 17 POWDER, FOR SOLUTION ORAL at 17:24

## 2022-02-07 RX ADMIN — Medication 20 MILLIGRAM(S): at 10:16

## 2022-02-07 RX ADMIN — ENOXAPARIN SODIUM 40 MILLIGRAM(S): 100 INJECTION SUBCUTANEOUS at 05:52

## 2022-02-07 RX ADMIN — Medication 25 MILLIGRAM(S): at 05:52

## 2022-02-07 RX ADMIN — SENNA PLUS 2 TABLET(S): 8.6 TABLET ORAL at 21:30

## 2022-02-07 RX ADMIN — GABAPENTIN 300 MILLIGRAM(S): 400 CAPSULE ORAL at 05:52

## 2022-02-07 RX ADMIN — ZINC SULFATE TAB 220 MG (50 MG ZINC EQUIVALENT) 220 MILLIGRAM(S): 220 (50 ZN) TAB at 11:28

## 2022-02-07 RX ADMIN — NYSTATIN CREAM 1 APPLICATION(S): 100000 CREAM TOPICAL at 05:52

## 2022-02-07 RX ADMIN — Medication 5 MILLIGRAM(S): at 21:29
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
## 2022-02-08 PROCEDURE — 99232 SBSQ HOSP IP/OBS MODERATE 35: CPT | Mod: GC

## 2022-02-08 RX ORDER — DRONABINOL 2.5 MG
5 CAPSULE ORAL EVERY 12 HOURS
Refills: 0 | Status: DISCONTINUED | OUTPATIENT
Start: 2022-02-08 | End: 2022-02-09

## 2022-02-08 RX ADMIN — GABAPENTIN 300 MILLIGRAM(S): 400 CAPSULE ORAL at 21:22

## 2022-02-08 RX ADMIN — NYSTATIN CREAM 1 APPLICATION(S): 100000 CREAM TOPICAL at 18:35

## 2022-02-08 RX ADMIN — Medication 20 MILLIGRAM(S): at 07:17

## 2022-02-08 RX ADMIN — GABAPENTIN 300 MILLIGRAM(S): 400 CAPSULE ORAL at 07:18

## 2022-02-08 RX ADMIN — Medication 25 MILLIGRAM(S): at 07:17

## 2022-02-08 RX ADMIN — Medication 20 MILLIGRAM(S): at 21:22

## 2022-02-08 RX ADMIN — POLYETHYLENE GLYCOL 3350 17 GRAM(S): 17 POWDER, FOR SOLUTION ORAL at 18:36

## 2022-02-08 RX ADMIN — CYCLOBENZAPRINE HYDROCHLORIDE 5 MILLIGRAM(S): 10 TABLET, FILM COATED ORAL at 01:56

## 2022-02-08 RX ADMIN — ENOXAPARIN SODIUM 40 MILLIGRAM(S): 100 INJECTION SUBCUTANEOUS at 07:18

## 2022-02-08 RX ADMIN — Medication 5 MILLIGRAM(S): at 21:22

## 2022-02-08 RX ADMIN — Medication 10 MILLIGRAM(S): at 21:34

## 2022-02-08 RX ADMIN — GABAPENTIN 300 MILLIGRAM(S): 400 CAPSULE ORAL at 15:46

## 2022-02-08 RX ADMIN — NYSTATIN CREAM 1 APPLICATION(S): 100000 CREAM TOPICAL at 07:19

## 2022-02-08 RX ADMIN — SENNA PLUS 2 TABLET(S): 8.6 TABLET ORAL at 21:34

## 2022-02-08 RX ADMIN — Medication 20 MILLIGRAM(S): at 15:46

## 2022-02-08 RX ADMIN — ZINC SULFATE TAB 220 MG (50 MG ZINC EQUIVALENT) 220 MILLIGRAM(S): 220 (50 ZN) TAB at 11:23
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
## 2022-02-08 NOTE — PROGRESS NOTE ADULT - PROBLEM SELECTOR PROBLEM 4
Decubitus ulcer

## 2022-02-08 NOTE — PROGRESS NOTE ADULT - NUTRITIONAL ASSESSMENT
This patient has been assessed with a concern for Malnutrition and has been determined to have a diagnosis/diagnoses of Severe protein-calorie malnutrition and Underweight (BMI < 19).    This patient is being managed with:   Diet Regular-  High Fiber (HIFIBER)  Supplement Feeding Modality:  Oral  Ensure Plant-Based Cans or Servings Per Day:  1       Frequency:  Three Times a day  Entered: Feb 1 2022  2:39PM    Diet Regular-  Entered: Feb 1 2022  1:01AM    The following pending diet order is being considered for treatment of Severe protein-calorie malnutrition and Underweight (BMI < 19):null

## 2022-02-08 NOTE — PROGRESS NOTE ADULT - PROBLEM SELECTOR PLAN 5
with LFTs and bilirubin within normal limits. Patient without any abdominal pain.   - GGT wnl so nonhepatic origin
 with LFTs and bilirubin within normal limits. Patient without any abdominal pain.   - GGT wnl so nonhepatic origin  - f/up vitamin D level
 with LFTs and bilirubin within normal limits. Patient without any abdominal pain.   - GGT wnl so nonhepatic origin

## 2022-02-08 NOTE — PROGRESS NOTE ADULT - PROBLEM SELECTOR PLAN 1
Last BM was 4 days ago and she normally goes every other day. Patient is unable to determine if she's passing gas secondary to quadriplegia, but admits to bloating. She denies nausea, vomiting, abdominal pain. CT imaging did reveal extensive stool burden throughout large bowel. No evidence of mechanical bowel obstruction.   - s/p fecal disimpaction with reported 2 large golf ball stools removed  - s/p lactulose 20g x1   - c/w miralax BID  - c/w senna 2tabs qhs  - PRN fleet enemas
Last BM was 4 days ago and she normally goes every other day. Patient is unable to determine if she's passing gas secondary to quadriplegia, but admits to bloating. She denies nausea, vomiting, abdominal pain. CT imaging did reveal extensive stool burden throughout large bowel. No evidence of mechanical bowel obstruction. Currently having bowel movements.     - s/p fecal disimpaction with reported 2 large golf ball stools removed  - c/w miralax BID  - c/w senna 2tabs qhs  - PRN fleet enemas
Last BM was 4 days ago and she normally goes every other day. Patient is unable to determine if she's passing gas secondary to quadriplegia, but admits to bloating. She denies nausea, vomiting, abdominal pain. CT imaging did reveal extensive stool burden throughout large bowel. No evidence of mechanical bowel obstruction. Currently having bowel movements.   - s/p fecal disimpaction with reported 2 large golf ball stools removed  - Additional episode on 2/4 overnight, resolved with suppository  - c/w miralax BID  - c/w senna 2tabs qhs  - PRN fleet enemas  - Duloclax suppository as needed
Last BM was 4 days ago and she normally goes every other day. Patient is unable to determine if she's passing gas secondary to quadriplegia, but admits to bloating. She denies nausea, vomiting, abdominal pain. CT imaging did reveal extensive stool burden throughout large bowel. No evidence of mechanical bowel obstruction. Currently having bowel movements.     - s/p fecal disimpaction with reported 2 large golf ball stools removed  - c/w miralax BID  - c/w senna 2tabs qhs  - PRN fleet enemas
Last BM was 4 days ago and she normally goes every other day. Patient is unable to determine if she's passing gas secondary to quadriplegia, but admits to bloating. She denies nausea, vomiting, abdominal pain. CT imaging did reveal extensive stool burden throughout large bowel. No evidence of mechanical bowel obstruction. Currently having bowel movements.   - s/p fecal disimpaction with reported 2 large golf ball stools removed  - Additional episode on 2/4 overnight, resolved with suppository  - c/w miralax BID  - c/w senna 2tabs qhs  - PRN fleet enemas  - Duloclax suppository as needed
Last BM was 4 days ago and she normally goes every other day. Patient is unable to determine if she's passing gas secondary to quadriplegia, but admits to bloating. She denies nausea, vomiting, abdominal pain. CT imaging did reveal extensive stool burden throughout large bowel. No evidence of mechanical bowel obstruction. Had BM last night.   - s/p fecal disimpaction with reported 2 large golf ball stools removed  - c/w miralax BID  - c/w senna 2tabs qhs  - PRN fleet enemas
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
## 2022-02-08 NOTE — PROGRESS NOTE ADULT - SUBJECTIVE AND OBJECTIVE BOX
OVERNIGHT EVENTS: NAEO    SUBJECTIVE / INTERVAL HPI: Patient seen and examined at bedside. Patient denying any complaints this morning is moving her bowels. Patient denying chest pain, SOB, palpitations, cough. Patient denies fever, chills, HA, Dizziness, N/V, abdominal pain, diarrhea, hematochezia/melena, dysuria, hematuria, new onset weakness/numbness, LE pain and/or swelling.    Remaining ROS negative     PHYSICAL EXAM:  Constitutional: NAD, resting comfortably in bed  Head: NC/AT  Eyes: PERRL, EOMI, anicteric sclera  ENT: no nasal discharge; uvula midline, no oropharyngeal erythema or exudates; MMM  Neck: supple; no JVD   Respiratory: CTA B/L; no W/R/R  Cardiac: +S1/S2; RRR; no M/R/G  Gastrointestinal: soft NT, ND; no rebound or guarding; +BSx4, slightly hypoactive   Genitourinary: urostomy bag in place, draining clear yellow urine  Extremities: WWP, no clubbing or cyanosis; no peripheral edema  Musculoskeletal: 0/5 strength testing of lower extremities, patient reports cannot wiggle toes; 3/5 UE flexion; 1+/5 UE extension  Vascular: 2+ radial, DP pulses B/L  Dermatologic: skin warm and dry  Neurologic: AAOx3; CNII-XII grossly intact; reports intact sensation on all 4 extremities  Psychiatric: affect and characteristics of appearance, verbalizations, behaviors are appropriate    VITAL SIGNS:  Vital Signs Last 24 Hrs  T(C): 36.1 (03 Feb 2022 05:55), Max: 36.8 (02 Feb 2022 16:18)  T(F): 97 (03 Feb 2022 05:55), Max: 98.3 (02 Feb 2022 16:18)  HR: 67 (03 Feb 2022 05:55) (64 - 83)  BP: 132/81 (03 Feb 2022 05:55) (88/60 - 132/81)  BP(mean): 70 (02 Feb 2022 16:18) (70 - 70)  RR: 18 (03 Feb 2022 05:55) (18 - 18)  SpO2: 97% (03 Feb 2022 05:55) (97% - 98%)      MEDICATIONS:  MEDICATIONS  (STANDING):  amitriptyline 25 milliGRAM(s) Oral every 24 hours  baclofen 20 milliGRAM(s) Oral every 8 hours  dronabinol 5 milliGRAM(s) Oral every 12 hours  enoxaparin Injectable 40 milliGRAM(s) SubCutaneous every 24 hours  gabapentin 300 milliGRAM(s) Oral every 8 hours  influenza   Vaccine 0.5 milliLiter(s) IntraMuscular once  polyethylene glycol 3350 17 Gram(s) Oral every 12 hours  senna 2 Tablet(s) Oral at bedtime  zinc sulfate 220 milliGRAM(s) Oral daily    MEDICATIONS  (PRN):      ALLERGIES:  Allergies    No Known Allergies    Intolerances        LABS:                        10.6   6.06  )-----------( 231      ( 03 Feb 2022 07:08 )             33.8     02-03    137  |  103  |  12  ----------------------------<  92  3.9   |  25  |  0.28<L>    Ca    8.8      03 Feb 2022 07:08  Phos  4.5     02-03  Mg     2.2     02-03          CAPILLARY BLOOD GLUCOSE          RADIOLOGY & ADDITIONAL TESTS: Reviewed.
OVERNIGHT EVENTS: Patient had BM o/n.     SUBJECTIVE / INTERVAL HPI: Patient seen and examined at bedside. Still c/o  mild abdominal bloating, denies abdominal pain. Also Patient denying chest pain, SOB, palpitations, cough. Patient denies fever, chills, HA, Dizziness, N/V, diarrhea, hematochezia/melena, dysuria, hematuria, new onset LE pain and/or swelling.    Remaining ROS negative     PHYSICAL EXAM:  Constitutional: NAD, resting comfortably in bed  Head: NC/AT  Eyes: PERRL, EOMI, anicteric sclera  ENT: no nasal discharge; uvula midline, no oropharyngeal erythema or exudates; MMM  Neck: supple; no JVD   Respiratory: CTA B/L; no W/R/R  Cardiac: +S1/S2; RRR; no M/R/G  Gastrointestinal: soft NT, ND; no rebound or guarding; +BSx4, slightly hypoactive   Genitourinary: urostomy bag in place, draining clear yellow urine  Extremities: WWP, no clubbing or cyanosis; no peripheral edema  Musculoskeletal: 0/5 strength testing of lower extremities, patient reports cannot wiggle toes; 3/5 UE flexion; 1+/5 UE extension  Vascular: 2+ radial, DP pulses B/L  Dermatologic: skin warm and dry  Neurologic: AAOx3; CNII-XII grossly intact; reports intact sensation on all 4 extremities  Psychiatric: affect and characteristics of appearance, verbalizations, behaviors are appropriate  Vital Signs Last 24 Hrs  T(C): 36.8 (02 Feb 2022 05:18), Max: 37.1 (01 Feb 2022 20:53)  T(F): 98.3 (02 Feb 2022 05:18), Max: 98.8 (01 Feb 2022 20:53)  HR: 62 (02 Feb 2022 05:18) (62 - 77)  BP: 96/63 (02 Feb 2022 05:18) (96/63 - 110/61)  BP(mean): --  RR: 19 (02 Feb 2022 05:18) (18 - 19)  SpO2: 97% (02 Feb 2022 05:18) (96% - 99%)    MEDICATIONS:  MEDICATIONS  (STANDING):  amitriptyline 25 milliGRAM(s) Oral every 24 hours  baclofen 20 milliGRAM(s) Oral every 8 hours  dronabinol 5 milliGRAM(s) Oral every 12 hours  enoxaparin Injectable 40 milliGRAM(s) SubCutaneous every 24 hours  gabapentin 300 milliGRAM(s) Oral every 8 hours  influenza   Vaccine 0.5 milliLiter(s) IntraMuscular once  polyethylene glycol 3350 17 Gram(s) Oral every 12 hours  senna 2 Tablet(s) Oral at bedtime  zinc sulfate 220 milliGRAM(s) Oral daily    MEDICATIONS  (PRN):      ALLERGIES:  Allergies    No Known Allergies    Intolerances    .  LABS:                         12.9   7.21  )-----------( 249      ( 02 Feb 2022 07:12 )             42.1     02-02    138  |  104  |  16  ----------------------------<  143<H>  4.4   |  23  |  0.60    Ca    10.0      02 Feb 2022 07:12  Phos  3.1     02-02  Mg     1.9     02-02    TPro  6.7  /  Alb  3.9  /  TBili  0.2  /  DBili  x   /  AST  19  /  ALT  9<L>  /  AlkPhos  231<H>  01-31    PT/INR - ( 31 Jan 2022 20:10 )   PT: 10.9 sec;   INR: 0.90          PTT - ( 31 Jan 2022 20:10 )  PTT:25.8 sec              RADIOLOGY, EKG & ADDITIONAL TESTS: Reviewed. 
INTERVAL HPI/OVERNIGHT EVENTS: Constipation overnight. Dulcolax suppository    SUBJECTIVE: Patient seen and examined at bedside. Complaining of constipation overnight, had suppository with bowel movement this morning. Still thinking about [facility] and anxious about choosing one. Denies fever, headache, nausea, vomiting, chest pain, shortness of breath, abdominal pain, changes in urination.     OBJECTIVE:    VITAL SIGNS:  ICU Vital Signs Last 24 Hrs  T(C): 36.5 (05 Feb 2022 06:44), Max: 37.1 (04 Feb 2022 20:01)  T(F): 97.7 (05 Feb 2022 06:44), Max: 98.7 (04 Feb 2022 20:01)  HR: 54 (05 Feb 2022 06:44) (54 - 101)  BP: 118/77 (05 Feb 2022 06:44) (105/71 - 137/92)  BP(mean): --  ABP: --  ABP(mean): --  RR: 18 (05 Feb 2022 06:44) (18 - 18)  SpO2: 98% (05 Feb 2022 06:44) (97% - 98%)        CAPILLARY BLOOD GLUCOSE          PHYSICAL EXAM:  General: NAD, resting comfortably in bed, answering questions  Head: NC/AT  Eyes: PERRL, EOMI, anicteric sclera  ENT: no nasal discharge; uvula midline, no oropharyngeal erythema or exudates; MMM  Neck: supple; no JVD   Respiratory: CTA B/L; no W/R/R  Cardiac: +S1/S2; RRR; no M/R/G  Gastrointestinal: soft NT, ND; no rebound or guarding; +BSx4, slightly hypoactive   Genitourinary: urostomy bag in place, draining clear yellow urine  Extremities: WWP, no clubbing or cyanosis; no peripheral edema  Musculoskeletal: 0/5 strength testing of lower extremities, patient reports cannot wiggle toes; 3/5 UE flexion; 1+/5 UE extension  Vascular: 2+ radial, DP pulses B/L  Dermatologic: skin warm and dry  Neurologic: AAOx3    MEDICATIONS:  MEDICATIONS  (STANDING):  amitriptyline 25 milliGRAM(s) Oral every 24 hours  baclofen 20 milliGRAM(s) Oral every 8 hours  enoxaparin Injectable 40 milliGRAM(s) SubCutaneous every 24 hours  gabapentin 300 milliGRAM(s) Oral every 8 hours  influenza   Vaccine 0.5 milliLiter(s) IntraMuscular once  nystatin Powder 1 Application(s) Topical two times a day  polyethylene glycol 3350 17 Gram(s) Oral every 12 hours  senna 2 Tablet(s) Oral at bedtime  zinc sulfate 220 milliGRAM(s) Oral daily    MEDICATIONS  (PRN):  cyclobenzaprine 5 milliGRAM(s) Oral three times a day PRN Muscle Spasm  dronabinol 5 milliGRAM(s) Oral every 12 hours PRN Nausea and/or Vomiting      ALLERGIES:  Allergies    No Known Allergies    Intolerances        LABS:                          RADIOLOGY & ADDITIONAL TESTS: Reviewed.  
INTERVAL HPI/OVERNIGHT EVENTS: Constipation overnight. Dulcolax suppository    SUBJECTIVE: Patient seen and examined at bedside. Complaining of constipation overnight, had suppository with bowel movement this morning. Still thinking about [facility] and anxious about choosing one. Denies fever, headache, nausea, vomiting, chest pain, shortness of breath, abdominal pain, changes in urination.     OBJECTIVE:    VITAL SIGNS:  ICU Vital Signs Last 24 Hrs  T(C): 36.5 (05 Feb 2022 06:44), Max: 37.1 (04 Feb 2022 20:01)  T(F): 97.7 (05 Feb 2022 06:44), Max: 98.7 (04 Feb 2022 20:01)  HR: 54 (05 Feb 2022 06:44) (54 - 101)  BP: 118/77 (05 Feb 2022 06:44) (105/71 - 137/92)  BP(mean): --  ABP: --  ABP(mean): --  RR: 18 (05 Feb 2022 06:44) (18 - 18)  SpO2: 98% (05 Feb 2022 06:44) (97% - 98%)        CAPILLARY BLOOD GLUCOSE          PHYSICAL EXAM:  General: NAD, resting comfortably in bed, answering questions  Head: NC/AT  Eyes: PERRL, EOMI, anicteric sclera  ENT: no nasal discharge; uvula midline, no oropharyngeal erythema or exudates; MMM  Neck: supple; no JVD   Respiratory: CTA B/L; no W/R/R  Cardiac: +S1/S2; RRR; no M/R/G  Gastrointestinal: soft NT, ND; no rebound or guarding; +BSx4, slightly hypoactive   Genitourinary: urostomy bag in place, draining clear yellow urine  Extremities: WWP, no clubbing or cyanosis; no peripheral edema  Musculoskeletal: 0/5 strength testing of lower extremities, patient reports cannot wiggle toes; 3/5 UE flexion; 1+/5 UE extension  Vascular: 2+ radial, DP pulses B/L  Dermatologic: skin warm and dry  Neurologic: AAOx3    MEDICATIONS:  MEDICATIONS  (STANDING):  amitriptyline 25 milliGRAM(s) Oral every 24 hours  baclofen 20 milliGRAM(s) Oral every 8 hours  enoxaparin Injectable 40 milliGRAM(s) SubCutaneous every 24 hours  gabapentin 300 milliGRAM(s) Oral every 8 hours  influenza   Vaccine 0.5 milliLiter(s) IntraMuscular once  nystatin Powder 1 Application(s) Topical two times a day  polyethylene glycol 3350 17 Gram(s) Oral every 12 hours  senna 2 Tablet(s) Oral at bedtime  zinc sulfate 220 milliGRAM(s) Oral daily    MEDICATIONS  (PRN):  cyclobenzaprine 5 milliGRAM(s) Oral three times a day PRN Muscle Spasm  dronabinol 5 milliGRAM(s) Oral every 12 hours PRN Nausea and/or Vomiting      ALLERGIES:  Allergies    No Known Allergies    Intolerances        LABS:                          RADIOLOGY & ADDITIONAL TESTS: Reviewed.  
OVERNIGHT EVENTS: NAEO    SUBJECTIVE / INTERVAL HPI: Patient seen and examined at bedside. Patient denying chest pain, SOB, palpitations, cough. Patient denies fever, chills, HA, Dizziness, N/V, abdominal pain, diarrhea, constipation, hematochezia/melena. Patient is moving her bowels.   Remaining ROS negative     PHYSICAL EXAM:    General:  NAD, resting comfortably in bed, answering questions  Head: NC/AT  Eyes: PERRL, EOMI, anicteric sclera  ENT: no nasal discharge; uvula midline, no oropharyngeal erythema or exudates; MMM  Neck: supple; no JVD   Respiratory: CTA B/L; no W/R/R  Cardiac: +S1/S2; RRR; no M/R/G  Gastrointestinal: soft NT, ND; no rebound or guarding; +BSx4, slightly hypoactive   Genitourinary: urostomy bag in place, draining clear yellow urine  Extremities: WWP, no clubbing or cyanosis; no peripheral edema  Musculoskeletal: 0/5 strength testing of lower extremities, patient reports cannot wiggle toes; 3/5 UE flexion; 1+/5 UE extension  Vascular: 2+ radial, DP pulses B/L  Dermatologic: skin warm and dry  Neurologic: AAOx3    VITAL SIGNS:  Vital Signs Last 24 Hrs  T(C): 36.6 (07 Feb 2022 06:47), Max: 36.6 (06 Feb 2022 12:20)  T(F): 97.8 (07 Feb 2022 06:47), Max: 97.8 (06 Feb 2022 12:20)  HR: 76 (07 Feb 2022 06:47) (70 - 76)  BP: 124/83 (07 Feb 2022 06:47) (115/79 - 132/89)  BP(mean): 91 (06 Feb 2022 12:20) (91 - 91)  RR: 18 (07 Feb 2022 06:47) (18 - 18)  SpO2: 96% (07 Feb 2022 06:47) (96% - 99%)      MEDICATIONS:  MEDICATIONS  (STANDING):  amitriptyline 25 milliGRAM(s) Oral every 24 hours  baclofen 20 milliGRAM(s) Oral every 8 hours  enoxaparin Injectable 40 milliGRAM(s) SubCutaneous every 24 hours  gabapentin 300 milliGRAM(s) Oral every 8 hours  influenza   Vaccine 0.5 milliLiter(s) IntraMuscular once  melatonin 5 milliGRAM(s) Oral at bedtime  nystatin Powder 1 Application(s) Topical two times a day  polyethylene glycol 3350 17 Gram(s) Oral every 12 hours  senna 2 Tablet(s) Oral at bedtime  zinc sulfate 220 milliGRAM(s) Oral daily    MEDICATIONS  (PRN):  bisacodyl Suppository 10 milliGRAM(s) Rectal daily PRN Constipation  cyclobenzaprine 5 milliGRAM(s) Oral three times a day PRN Muscle Spasm  dronabinol 5 milliGRAM(s) Oral every 12 hours PRN Nausea and/or Vomiting      ALLERGIES:  Allergies    No Known Allergies    Intolerances        LABS:              CAPILLARY BLOOD GLUCOSE          RADIOLOGY & ADDITIONAL TESTS: Reviewed.
OVERNIGHT EVENTS: NAEO    SUBJECTIVE / INTERVAL HPI: Patient seen and examined at bedside. Patient denying chest pain, SOB, palpitations, cough. Patient denies fever, chills, HA, Dizziness, N/V, abdominal pain, diarrhea, constipation, hematochezia/melena. Patient is moving her bowels.   Remaining ROS negative     PHYSICAL EXAM:    General:  NAD, resting comfortably in bed, answering questions  Head: NC/AT  Eyes: PERRL, EOMI, anicteric sclera  ENT: no nasal discharge; uvula midline, no oropharyngeal erythema or exudates; MMM  Neck: supple; no JVD   Respiratory: CTA B/L; no W/R/R  Cardiac: +S1/S2; RRR; no M/R/G  Gastrointestinal: soft NT, ND; no rebound or guarding; +BSx4, slightly hypoactive   Genitourinary: urostomy bag in place, draining clear yellow urine  Extremities: WWP, no clubbing or cyanosis; no peripheral edema  Musculoskeletal: 0/5 strength testing of lower extremities, patient reports cannot wiggle toes; 3/5 UE flexion; 1+/5 UE extension  Vascular: 2+ radial, DP pulses B/L  Dermatologic: skin warm and dry  Neurologic: AAOx3    Vital Signs Last 24 Hrs  T(C): 36.4 (08 Feb 2022 05:58), Max: 36.7 (07 Feb 2022 12:10)  T(F): 97.6 (08 Feb 2022 05:58), Max: 98 (07 Feb 2022 12:10)  HR: 68 (08 Feb 2022 05:58) (63 - 68)  BP: 136/90 (08 Feb 2022 05:58) (114/60 - 136/90)  BP(mean): --  RR: 18 (08 Feb 2022 05:58) (18 - 18)  SpO2: 97% (08 Feb 2022 05:58) (97% - 98%)    MEDICATIONS:  MEDICATIONS  (STANDING):  amitriptyline 25 milliGRAM(s) Oral every 24 hours  baclofen 20 milliGRAM(s) Oral every 8 hours  enoxaparin Injectable 40 milliGRAM(s) SubCutaneous every 24 hours  gabapentin 300 milliGRAM(s) Oral every 8 hours  influenza   Vaccine 0.5 milliLiter(s) IntraMuscular once  melatonin 5 milliGRAM(s) Oral at bedtime  nystatin Powder 1 Application(s) Topical two times a day  polyethylene glycol 3350 17 Gram(s) Oral every 12 hours  senna 2 Tablet(s) Oral at bedtime  zinc sulfate 220 milliGRAM(s) Oral daily    MEDICATIONS  (PRN):  bisacodyl Suppository 10 milliGRAM(s) Rectal daily PRN Constipation  cyclobenzaprine 5 milliGRAM(s) Oral three times a day PRN Muscle Spasm  dronabinol 5 milliGRAM(s) Oral every 12 hours PRN Nausea and/or Vomiting      ALLERGIES:  Allergies    No Known Allergies    Intolerances        LABS:              CAPILLARY BLOOD GLUCOSE          RADIOLOGY & ADDITIONAL TESTS: Reviewed.
OVERNIGHT EVENTS: NAEO    SUBJECTIVE / INTERVAL HPI: Patient seen and examined at bedside. Patient denying chest pain, SOB, palpitations, cough. Patient denies fever, chills, HA, Dizziness, N/V, abdominal pain, diarrhea, hematochezia/melena, dysuria, hematuria. Is moving BM on her own.     Remaining ROS negative       PHYSICAL EXAM:  Constitutional: NAD, resting comfortably in bed  Head: NC/AT  Eyes: PERRL, EOMI, anicteric sclera  ENT: no nasal discharge; uvula midline, no oropharyngeal erythema or exudates; MMM  Neck: supple; no JVD   Respiratory: CTA B/L; no W/R/R  Cardiac: +S1/S2; RRR; no M/R/G  Gastrointestinal: soft NT, ND; no rebound or guarding; +BSx4, slightly hypoactive   Genitourinary: urostomy bag in place, draining clear yellow urine  Extremities: WWP, no clubbing or cyanosis; no peripheral edema  Musculoskeletal: 0/5 strength testing of lower extremities, patient reports cannot wiggle toes; 3/5 UE flexion; 1+/5 UE extension  Vascular: 2+ radial, DP pulses B/L  Dermatologic: skin warm and dry  Neurologic: AAOx3; CNII-XII grossly intact; reports intact sensation on all 4 extremities  Psychiatric: affect and characteristics of appearance, verbalizations, behaviors are appropriate    VITAL SIGNS:  Vital Signs Last 24 Hrs  T(C): 36.9 (04 Feb 2022 05:46), Max: 36.9 (04 Feb 2022 05:46)  T(F): 98.4 (04 Feb 2022 05:46), Max: 98.4 (04 Feb 2022 05:46)  HR: 71 (04 Feb 2022 05:46) (59 - 89)  BP: 111/71 (04 Feb 2022 05:46) (111/71 - 135/84)  BP(mean): --  RR: 19 (04 Feb 2022 05:46) (18 - 19)  SpO2: 96% (04 Feb 2022 05:46) (96% - 98%)      MEDICATIONS:  MEDICATIONS  (STANDING):  amitriptyline 25 milliGRAM(s) Oral every 24 hours  baclofen 20 milliGRAM(s) Oral every 8 hours  enoxaparin Injectable 40 milliGRAM(s) SubCutaneous every 24 hours  gabapentin 300 milliGRAM(s) Oral every 8 hours  influenza   Vaccine 0.5 milliLiter(s) IntraMuscular once  polyethylene glycol 3350 17 Gram(s) Oral every 12 hours  senna 2 Tablet(s) Oral at bedtime  zinc sulfate 220 milliGRAM(s) Oral daily    MEDICATIONS  (PRN):  cyclobenzaprine 5 milliGRAM(s) Oral three times a day PRN Muscle Spasm  dronabinol 5 milliGRAM(s) Oral every 12 hours PRN Nausea and/or Vomiting      ALLERGIES:  Allergies    No Known Allergies    Intolerances        LABS:                        10.6   6.06  )-----------( 231      ( 03 Feb 2022 07:08 )             33.8     02-03    137  |  103  |  12  ----------------------------<  92  3.9   |  25  |  0.28<L>    Ca    8.8      03 Feb 2022 07:08  Phos  4.5     02-03  Mg     2.2     02-03          CAPILLARY BLOOD GLUCOSE          RADIOLOGY & ADDITIONAL TESTS: Reviewed.
OVERNIGHT EVENTS: NAEO.     SUBJECTIVE / INTERVAL HPI: Patient seen and examined at bedside. Still c/o constipation and mild abdominal bloating, denies abdominal pain AlsoPatient denying chest pain, SOB, palpitations, cough. Patient denies fever, chills, HA, Dizziness, N/V, diarrhea, hematochezia/melena, dysuria, hematuria, new onset LE pain and/or swelling.    Remaining ROS negative     PHYSICAL EXAM:  Constitutional: NAD, resting comfortably in bed  Head: NC/AT  Eyes: PERRL, EOMI, anicteric sclera  ENT: no nasal discharge; uvula midline, no oropharyngeal erythema or exudates; MMM  Neck: supple; no JVD   Respiratory: CTA B/L; no W/R/R  Cardiac: +S1/S2; RRR; no M/R/G  Gastrointestinal: soft NT, ND; no rebound or guarding; +BSx4, slightly hypoactive   Genitourinary: urostomy bag in place, draining clear yellow urine  Extremities: WWP, no clubbing or cyanosis; no peripheral edema  Musculoskeletal: 0/5 strength testing of lower extremities, patient reports cannot wiggle toes; 3/5 UE flexion; 1+/5 UE extension  Vascular: 2+ radial, DP pulses B/L  Dermatologic: skin warm and dry  Neurologic: AAOx3; CNII-XII grossly intact; reports intact sensation on all 4 extremities  Psychiatric: affect and characteristics of appearance, verbalizations, behaviors are appropriate    VITAL SIGNS:  Vital Signs Last 24 Hrs  T(C): 36.3 (01 Feb 2022 05:22), Max: 36.7 (01 Feb 2022 01:58)  T(F): 97.4 (01 Feb 2022 05:22), Max: 98.1 (01 Feb 2022 01:58)  HR: 63 (01 Feb 2022 05:22) (59 - 68)  BP: 112/72 (01 Feb 2022 05:22) (99/66 - 196/132)  BP(mean): --  RR: 18 (01 Feb 2022 05:22) (16 - 18)  SpO2: 100% (01 Feb 2022 05:22) (97% - 100%)      MEDICATIONS:  MEDICATIONS  (STANDING):  amitriptyline 25 milliGRAM(s) Oral every 24 hours  baclofen 20 milliGRAM(s) Oral every 8 hours  dronabinol 5 milliGRAM(s) Oral every 12 hours  enoxaparin Injectable 40 milliGRAM(s) SubCutaneous every 24 hours  gabapentin 300 milliGRAM(s) Oral every 8 hours  influenza   Vaccine 0.5 milliLiter(s) IntraMuscular once  polyethylene glycol 3350 17 Gram(s) Oral every 12 hours  senna 2 Tablet(s) Oral at bedtime  zinc sulfate 220 milliGRAM(s) Oral daily    MEDICATIONS  (PRN):      ALLERGIES:  Allergies    No Known Allergies    Intolerances        LABS:                        10.8   5.27  )-----------( 210      ( 01 Feb 2022 07:30 )             35.9     02-01    138  |  106  |  14  ----------------------------<  95  See note   |  22  |  0.40<L>    Ca    8.8      01 Feb 2022 07:30  Phos  4.6     02-01  Mg     1.9     02-01    TPro  6.7  /  Alb  3.9  /  TBili  0.2  /  DBili  x   /  AST  19  /  ALT  9<L>  /  AlkPhos  231<H>  01-31    PT/INR - ( 31 Jan 2022 20:10 )   PT: 10.9 sec;   INR: 0.90          PTT - ( 31 Jan 2022 20:10 )  PTT:25.8 sec    CAPILLARY BLOOD GLUCOSE          RADIOLOGY & ADDITIONAL TESTS: Reviewed.

## 2022-02-08 NOTE — PROGRESS NOTE ADULT - PROBLEM SELECTOR PROBLEM 3
Quadriplegia

## 2022-02-08 NOTE — PROGRESS NOTE ADULT - PROBLEM SELECTOR PROBLEM 5
Elevated alkaline phosphatase level
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
## 2022-02-08 NOTE — PROGRESS NOTE ADULT - PROBLEM SELECTOR PLAN 6
F: tolerating PO, no IVF  E: replete K<4, Mg<2  N: regular  VTE Prophylaxis: Lovenox 40 Q24H  D: RMF --> pending rehab placement  - Medically ready for discharge to subacute rehab
F: tolerating PO, no IVF  E: replete K<4, Mg<2  N: regular  VTE Prophylaxis: Lovenox 40 Q24H  D: RMF --> pending rehab placement  - Medically ready for discharge to subacute rehab
F: tolerating PO, no IVF  E: replete K<4, Mg<2  N: regular  VTE Prophylaxis: Lovenox 40 Q24H  D: RMF --> pending rehab placement
F: tolerating PO, no IVF  E: replete K<4, Mg<2  N: regular  VTE Prophylaxis: Lovenox 40 Q24H  D: RMF --> pending rehab placement
F: tolerating PO, no IVF  E: replete K<4, Mg<2  N: regular  VTE Prophylaxis: Lovenox 40 Q24H  D: RMF --> pending rehab placement  - Medically ready for discharge to subacute rehab
F: tolerating PO, no IVF  E: replete K<4, Mg<2  N: regular  VTE Prophylaxis: Lovenox 40 Q24H  D: RMF --> pending rehab placement
F: tolerating PO, no IVF  E: replete K<4, Mg<2  N: regular  VTE Prophylaxis: Lovenox 40 Q24H  D: RMF --> pending rehab placement
F: tolerating PO, no IVF  E: replete K<4, Mg<2  N: regular  VTE Prophylaxis: Lovenox 40 Q24H  D: RMF --> pending rehab placement  - Medically ready for discharge to subacute rehab

## 2022-02-08 NOTE — PROGRESS NOTE ADULT - REASON FOR ADMISSION
Constipation, safe discharge planning

## 2022-02-09 ENCOUNTER — TRANSCRIPTION ENCOUNTER (OUTPATIENT)
Age: 52
End: 2022-02-09

## 2022-02-09 ENCOUNTER — INPATIENT (INPATIENT)
Facility: HOSPITAL | Age: 52
LOS: 4 days | Discharge: NURSING, REHAB OR LONG-TERM CARE | DRG: 52 | End: 2022-02-14
Attending: INTERNAL MEDICINE | Admitting: INTERNAL MEDICINE
Payer: MEDICARE

## 2022-02-09 VITALS
RESPIRATION RATE: 18 BRPM | DIASTOLIC BLOOD PRESSURE: 64 MMHG | TEMPERATURE: 98 F | HEART RATE: 61 BPM | SYSTOLIC BLOOD PRESSURE: 123 MMHG | OXYGEN SATURATION: 96 %

## 2022-02-09 VITALS
DIASTOLIC BLOOD PRESSURE: 72 MMHG | HEIGHT: 67 IN | SYSTOLIC BLOOD PRESSURE: 98 MMHG | RESPIRATION RATE: 18 BRPM | HEART RATE: 98 BPM | OXYGEN SATURATION: 99 % | TEMPERATURE: 97 F

## 2022-02-09 DIAGNOSIS — K59.00 CONSTIPATION, UNSPECIFIED: ICD-10-CM

## 2022-02-09 DIAGNOSIS — M25.572 PAIN IN LEFT ANKLE AND JOINTS OF LEFT FOOT: ICD-10-CM

## 2022-02-09 DIAGNOSIS — L89.90 PRESSURE ULCER OF UNSPECIFIED SITE, UNSPECIFIED STAGE: ICD-10-CM

## 2022-02-09 DIAGNOSIS — Z29.9 ENCOUNTER FOR PROPHYLACTIC MEASURES, UNSPECIFIED: ICD-10-CM

## 2022-02-09 DIAGNOSIS — G82.50 QUADRIPLEGIA, UNSPECIFIED: ICD-10-CM

## 2022-02-09 DIAGNOSIS — E83.39 OTHER DISORDERS OF PHOSPHORUS METABOLISM: ICD-10-CM

## 2022-02-09 DIAGNOSIS — G62.9 POLYNEUROPATHY, UNSPECIFIED: ICD-10-CM

## 2022-02-09 DIAGNOSIS — Z98.890 OTHER SPECIFIED POSTPROCEDURAL STATES: Chronic | ICD-10-CM

## 2022-02-09 LAB
ANION GAP SERPL CALC-SCNC: 11 MMOL/L — SIGNIFICANT CHANGE UP (ref 5–17)
BUN SERPL-MCNC: 12 MG/DL — SIGNIFICANT CHANGE UP (ref 7–23)
CALCIUM SERPL-MCNC: 9.4 MG/DL — SIGNIFICANT CHANGE UP (ref 8.4–10.5)
CHLORIDE SERPL-SCNC: 107 MMOL/L — SIGNIFICANT CHANGE UP (ref 96–108)
CO2 SERPL-SCNC: 23 MMOL/L — SIGNIFICANT CHANGE UP (ref 22–31)
CREAT SERPL-MCNC: 0.26 MG/DL — LOW (ref 0.5–1.3)
GLUCOSE SERPL-MCNC: 88 MG/DL — SIGNIFICANT CHANGE UP (ref 70–99)
HCT VFR BLD CALC: 36.7 % — SIGNIFICANT CHANGE UP (ref 34.5–45)
HGB BLD-MCNC: 11.2 G/DL — LOW (ref 11.5–15.5)
MAGNESIUM SERPL-MCNC: 1.8 MG/DL — SIGNIFICANT CHANGE UP (ref 1.6–2.6)
MCHC RBC-ENTMCNC: 30.2 PG — SIGNIFICANT CHANGE UP (ref 27–34)
MCHC RBC-ENTMCNC: 30.5 GM/DL — LOW (ref 32–36)
MCV RBC AUTO: 98.9 FL — SIGNIFICANT CHANGE UP (ref 80–100)
NRBC # BLD: 0 /100 WBCS — SIGNIFICANT CHANGE UP (ref 0–0)
PHOSPHATE SERPL-MCNC: 4.6 MG/DL — HIGH (ref 2.5–4.5)
PLATELET # BLD AUTO: 235 K/UL — SIGNIFICANT CHANGE UP (ref 150–400)
POTASSIUM SERPL-MCNC: 3.8 MMOL/L — SIGNIFICANT CHANGE UP (ref 3.5–5.3)
POTASSIUM SERPL-SCNC: 3.8 MMOL/L — SIGNIFICANT CHANGE UP (ref 3.5–5.3)
RBC # BLD: 3.71 M/UL — LOW (ref 3.8–5.2)
RBC # FLD: 13.7 % — SIGNIFICANT CHANGE UP (ref 10.3–14.5)
SODIUM SERPL-SCNC: 141 MMOL/L — SIGNIFICANT CHANGE UP (ref 135–145)
WBC # BLD: 5.1 K/UL — SIGNIFICANT CHANGE UP (ref 3.8–10.5)
WBC # FLD AUTO: 5.1 K/UL — SIGNIFICANT CHANGE UP (ref 3.8–10.5)

## 2022-02-09 PROCEDURE — 97110 THERAPEUTIC EXERCISES: CPT

## 2022-02-09 PROCEDURE — 99285 EMERGENCY DEPT VISIT HI MDM: CPT

## 2022-02-09 PROCEDURE — U0003: CPT

## 2022-02-09 PROCEDURE — 84100 ASSAY OF PHOSPHORUS: CPT

## 2022-02-09 PROCEDURE — 85027 COMPLETE CBC AUTOMATED: CPT

## 2022-02-09 PROCEDURE — 99239 HOSP IP/OBS DSCHRG MGMT >30: CPT | Mod: GC

## 2022-02-09 PROCEDURE — 97140 MANUAL THERAPY 1/> REGIONS: CPT

## 2022-02-09 PROCEDURE — 80048 BASIC METABOLIC PNL TOTAL CA: CPT

## 2022-02-09 PROCEDURE — 82977 ASSAY OF GGT: CPT

## 2022-02-09 PROCEDURE — U0005: CPT

## 2022-02-09 PROCEDURE — 85610 PROTHROMBIN TIME: CPT

## 2022-02-09 PROCEDURE — 85730 THROMBOPLASTIN TIME PARTIAL: CPT

## 2022-02-09 PROCEDURE — 36415 COLL VENOUS BLD VENIPUNCTURE: CPT

## 2022-02-09 PROCEDURE — 97161 PT EVAL LOW COMPLEX 20 MIN: CPT

## 2022-02-09 PROCEDURE — 80053 COMPREHEN METABOLIC PANEL: CPT

## 2022-02-09 PROCEDURE — 74177 CT ABD & PELVIS W/CONTRAST: CPT | Mod: MA

## 2022-02-09 PROCEDURE — 97162 PT EVAL MOD COMPLEX 30 MIN: CPT

## 2022-02-09 PROCEDURE — 83735 ASSAY OF MAGNESIUM: CPT

## 2022-02-09 PROCEDURE — 85025 COMPLETE CBC W/AUTO DIFF WBC: CPT

## 2022-02-09 PROCEDURE — 83605 ASSAY OF LACTIC ACID: CPT

## 2022-02-09 PROCEDURE — 99223 1ST HOSP IP/OBS HIGH 75: CPT

## 2022-02-09 PROCEDURE — 82306 VITAMIN D 25 HYDROXY: CPT

## 2022-02-09 RX ORDER — BACLOFEN 100 %
20 POWDER (GRAM) MISCELLANEOUS EVERY 8 HOURS
Refills: 0 | Status: DISCONTINUED | OUTPATIENT
Start: 2022-02-09 | End: 2022-02-14

## 2022-02-09 RX ORDER — ENOXAPARIN SODIUM 100 MG/ML
40 INJECTION SUBCUTANEOUS
Qty: 0 | Refills: 0 | DISCHARGE
Start: 2022-02-09

## 2022-02-09 RX ORDER — AMITRIPTYLINE HCL 25 MG
25 TABLET ORAL AT BEDTIME
Refills: 0 | Status: DISCONTINUED | OUTPATIENT
Start: 2022-02-09 | End: 2022-02-14

## 2022-02-09 RX ORDER — GABAPENTIN 400 MG/1
300 CAPSULE ORAL ONCE
Refills: 0 | Status: COMPLETED | OUTPATIENT
Start: 2022-02-09 | End: 2022-02-09

## 2022-02-09 RX ORDER — CYCLOBENZAPRINE HYDROCHLORIDE 10 MG/1
5 TABLET, FILM COATED ORAL THREE TIMES A DAY
Refills: 0 | Status: DISCONTINUED | OUTPATIENT
Start: 2022-02-09 | End: 2022-02-14

## 2022-02-09 RX ORDER — POLYETHYLENE GLYCOL 3350 17 G/17G
17 POWDER, FOR SOLUTION ORAL DAILY
Refills: 0 | Status: DISCONTINUED | OUTPATIENT
Start: 2022-02-09 | End: 2022-02-14

## 2022-02-09 RX ORDER — DRONABINOL 2.5 MG
5 CAPSULE ORAL EVERY 12 HOURS
Refills: 0 | Status: DISCONTINUED | OUTPATIENT
Start: 2022-02-09 | End: 2022-02-14

## 2022-02-09 RX ORDER — LANOLIN ALCOHOL/MO/W.PET/CERES
5 CREAM (GRAM) TOPICAL AT BEDTIME
Refills: 0 | Status: DISCONTINUED | OUTPATIENT
Start: 2022-02-09 | End: 2022-02-14

## 2022-02-09 RX ORDER — ASCORBIC ACID 60 MG
500 TABLET,CHEWABLE ORAL DAILY
Refills: 0 | Status: DISCONTINUED | OUTPATIENT
Start: 2022-02-09 | End: 2022-02-14

## 2022-02-09 RX ORDER — SENNA PLUS 8.6 MG/1
2 TABLET ORAL AT BEDTIME
Refills: 0 | Status: DISCONTINUED | OUTPATIENT
Start: 2022-02-09 | End: 2022-02-14

## 2022-02-09 RX ORDER — ZINC SULFATE TAB 220 MG (50 MG ZINC EQUIVALENT) 220 (50 ZN) MG
220 TAB ORAL DAILY
Refills: 0 | Status: DISCONTINUED | OUTPATIENT
Start: 2022-02-09 | End: 2022-02-14

## 2022-02-09 RX ORDER — BACLOFEN 100 %
20 POWDER (GRAM) MISCELLANEOUS ONCE
Refills: 0 | Status: COMPLETED | OUTPATIENT
Start: 2022-02-09 | End: 2022-02-09

## 2022-02-09 RX ORDER — CYCLOBENZAPRINE HYDROCHLORIDE 10 MG/1
1 TABLET, FILM COATED ORAL
Qty: 0 | Refills: 0 | DISCHARGE
Start: 2022-02-09

## 2022-02-09 RX ORDER — LANOLIN ALCOHOL/MO/W.PET/CERES
1 CREAM (GRAM) TOPICAL
Qty: 0 | Refills: 0 | DISCHARGE
Start: 2022-02-09

## 2022-02-09 RX ORDER — ENOXAPARIN SODIUM 100 MG/ML
40 INJECTION SUBCUTANEOUS EVERY 24 HOURS
Refills: 0 | Status: DISCONTINUED | OUTPATIENT
Start: 2022-02-10 | End: 2022-02-10

## 2022-02-09 RX ORDER — GABAPENTIN 400 MG/1
300 CAPSULE ORAL EVERY 8 HOURS
Refills: 0 | Status: DISCONTINUED | OUTPATIENT
Start: 2022-02-09 | End: 2022-02-13

## 2022-02-09 RX ORDER — NYSTATIN CREAM 100000 [USP'U]/G
1 CREAM TOPICAL
Qty: 0 | Refills: 0 | DISCHARGE
Start: 2022-02-09

## 2022-02-09 RX ORDER — AMITRIPTYLINE HCL 25 MG
25 TABLET ORAL ONCE
Refills: 0 | Status: COMPLETED | OUTPATIENT
Start: 2022-02-09 | End: 2022-02-09

## 2022-02-09 RX ADMIN — Medication 25 MILLIGRAM(S): at 07:06

## 2022-02-09 RX ADMIN — Medication 20 MILLIGRAM(S): at 07:07

## 2022-02-09 RX ADMIN — Medication 20 MILLIGRAM(S): at 23:22

## 2022-02-09 RX ADMIN — GABAPENTIN 300 MILLIGRAM(S): 400 CAPSULE ORAL at 23:22

## 2022-02-09 RX ADMIN — GABAPENTIN 300 MILLIGRAM(S): 400 CAPSULE ORAL at 13:33

## 2022-02-09 RX ADMIN — NYSTATIN CREAM 1 APPLICATION(S): 100000 CREAM TOPICAL at 07:33

## 2022-02-09 RX ADMIN — GABAPENTIN 300 MILLIGRAM(S): 400 CAPSULE ORAL at 07:07

## 2022-02-09 RX ADMIN — ZINC SULFATE TAB 220 MG (50 MG ZINC EQUIVALENT) 220 MILLIGRAM(S): 220 (50 ZN) TAB at 11:10

## 2022-02-09 RX ADMIN — Medication 20 MILLIGRAM(S): at 13:33

## 2022-02-09 RX ADMIN — ENOXAPARIN SODIUM 40 MILLIGRAM(S): 100 INJECTION SUBCUTANEOUS at 07:06

## 2022-02-09 NOTE — ED PROVIDER NOTE - CARE PLAN
1 Principal Discharge DX:	Social problem  Secondary Diagnosis:	Evaluation by medical service required

## 2022-02-09 NOTE — ED PROVIDER NOTE - OBJECTIVE STATEMENT
Pt is a 51F who presents to ED for admission. pt recently discharged from [facility] to nursing home. Upon arrival, pt was not received since the facility is unable to accommodate motorized wheelchairs. Pt sent back to [facility] for further management. Pt has no acute complaints.

## 2022-02-09 NOTE — DISCHARGE NOTE NURSING/CASE MANAGEMENT/SOCIAL WORK - NSDCPEFALRISK_GEN_ALL_CORE
For information on Fall & Injury Prevention, visit: [email or web address] OR  [email or web address] OR  https://www.cdc.gov/steadi/patient.html
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
## 2022-02-09 NOTE — H&P ADULT - PROBLEM SELECTOR PLAN 5
- c/w home Gabapentin 300mg PO TID   - c/w home amitriptyline 25mg PO daily once QTc confirmed within normal limits  - c/w home Baclofen 20mg PO TID  - c/w dronabinol 5mg PO BID.

## 2022-02-09 NOTE — H&P ADULT - PROBLEM SELECTOR PLAN 7
F: tolerating PO, no IVF  E: replete K<4, Mg<2  N: regular  VTE Prophylaxis: Lovenox 40 Q24H  D: RMF --> pending rehab placement

## 2022-02-09 NOTE — H&P ADULT - NSHPPHYSICALEXAM_GEN_ALL_CORE
Vital Signs (24 Hrs):  T(C): 36.2 (02-09-22 @ 22:17), Max: 36.8 (02-09-22 @ 11:39)  HR: 98 (02-09-22 @ 22:17) (59 - 98)  BP: 98/72 (02-09-22 @ 22:17) (96/58 - 129/63)  RR: 18 (02-09-22 @ 22:17) (18 - 19)  SpO2: 99% (02-09-22 @ 22:17) (96% - 99%)  Wt(kg): --    PHYSICAL EXAM:  GENERAL: NAD  HEENT: MMM  NECK: NO LAD  CHEST/LUNG: Clear to auscultation bilaterally; No wheeze, rhonchi, or rales  HEART: Regular rate and rhythm; No murmurs, rubs, or gallops  ABDOMEN: Soft, Nontender, Nondistended; Bowel sounds present  EXTREMITIES:  2+ Peripheral Pulses, No clubbing, cyanosis, or edema  PSYCH: AAOx3, cooperative, appropriate  NEUROLOGY: AAOx3, CN II-XII intact. Paraplegic. No swelling over LLE

## 2022-02-09 NOTE — DISCHARGE NOTE NURSING/CASE MANAGEMENT/SOCIAL WORK - PATIENT PORTAL LINK FT
You can access the FollowMyHealth Patient Portal offered by [facility] by registering at the following website: [email or web address]. By joining [facility]’s FollowMyHealth portal, you will also be able to view your health information using other applications (apps) compatible with our system.

## 2022-02-09 NOTE — DISCHARGE NOTE PROVIDER - CARE PROVIDER_API CALL
kashmir Mcelroy (NP)  [address] [facility]  [place] [address]  Phone: [phone]  Phone: (   )    -  Fax: (   )    -  Established Patient  Follow Up Time:    kashmir Mcelroy (NP)  [address] [facility], [address]nd [place] [address]  Phone: [phone]  Phone: (   )    -  Fax: (   )    -  Established Patient  Scheduled Appointment: 03/28/2022 03:20 PM

## 2022-02-09 NOTE — H&P ADULT - NSHPLABSRESULTS_GEN_ALL_CORE
.  LABS:                         11.2   5.10  )-----------( 235      ( 09 Feb 2022 08:10 )             36.7     02-09    141  |  107  |  12  ----------------------------<  88  3.8   |  23  |  0.26<L>    Ca    9.4      09 Feb 2022 08:10  Phos  4.6     02-09  Mg     1.8     02-09                    RADIOLOGY, EKG & ADDITIONAL TESTS: Reviewed.

## 2022-02-09 NOTE — DISCHARGE NOTE PROVIDER - PROVIDER TOKENS
FREE:[LAST:[Navi],FIRST:[kashmir (RAVEN)],PHONE:[(   )    -],FAX:[(   )    -],ADDRESS:[[address] [place] [address], [address] [place] [address]  Phone: [phone]],ESTABLISHEDPATIENT:[T]] FREE:[LAST:[Mcelroy],FIRST:[kashmir (RAVEN)],PHONE:[(   )    -],FAX:[(   )    -],ADDRESS:[[address] [place] [address]  Phone: [phone]],SCHEDULEDAPPT:[03/28/2022],SCHEDULEDAPPTTIME:[03:20 PM],ESTABLISHEDPATIENT:[T]]

## 2022-02-09 NOTE — H&P ADULT - PROBLEM SELECTOR PLAN 2
Last BM yesterday.  - c/w miralax BID  - c/w senna 2tabs qhs  - PRN fleet enemas  - Duloclax suppository as needed.

## 2022-02-09 NOTE — H&P ADULT - PROBLEM SELECTOR PLAN 3
after working with PT today. Ankle with no TTP, no edema.  - Xray ordered by ED provider  - pain control as below

## 2022-02-09 NOTE — ED PROVIDER NOTE - CLINICAL SUMMARY MEDICAL DECISION MAKING FREE TEXT BOX
Pt is a 51F presenting to ED for social admit.   Vitals stable, no acute complaints. Pt is AAox3 and in NAD in ED.

## 2022-02-09 NOTE — DISCHARGE NOTE PROVIDER - NSDCMRMEDTOKEN_GEN_ALL_CORE_FT
AMITRIPTYLINE 25MG TABLETS: 1 tab(s) orally once a day (at bedtime)  ascorbic acid 500 mg oral tablet: 1 tab(s) orally once a day  BACLOFEN 20MG TAB: 1 tab(s) orally every 8 hours  BISACODYL 10MG SUPPOSITORIES: 1 each rectal once a day (at bedtime), As Needed  cyclobenzaprine 5 mg oral tablet: 1 tab(s) orally 3 times a day, As needed, Muscle Spasm  dronabinol 5 mg oral capsule: 1 cap(s) orally every 12 hours  enoxaparin: 40 milligram(s) for dvt ppx  GABAPENTIN 300MG CAP: 1 cap(s) orally every 8 hours  melatonin 5 mg oral tablet: 1 tab(s) orally once a day (at bedtime)  Multiple Vitamins oral tablet: 1 tab(s) orally once a day  nystatin 100,000 units/g topical powder: 1 application topically 2 times a day  polyethylene glycol 3350 oral powder for reconstitution: 17 gram(s) orally once a day  senna oral tablet: 2 tab(s) orally every 24 hours  zinc sulfate 220 mg oral capsule: 1 cap(s) orally once a day

## 2022-02-09 NOTE — H&P ADULT - NSHPSOCIALHISTORY_GEN_ALL_CORE
quadriplegia at baseline, gets around with motorized vehicle. Admits to smoking marijuana on a daily basis. Denies other tobacco product use
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
## 2022-02-09 NOTE — H&P ADULT - PROBLEM SELECTOR PLAN 6
- Wound care consult   - c/w nystatin powder as needed  - c/w frequent repositioning and dressing changes per nursing.

## 2022-02-09 NOTE — DISCHARGE NOTE PROVIDER - NSDCFUADDAPPT_GEN_ALL_CORE_FT
Please bring your Insurance card, Photo ID and Discharge paperwork to the following appointment:    (1) Please follow up with your Primary Care Provider, Nurse Practitioner Kaley Mcelroy at [address] [place] [address], [address] [place] [address] on 03/28/2022 at 3:20pm.    Please note that the office will contact you for an earlier appointment once available.    Appointment was scheduled by Ms. JANEEN Farley, Referral Coordinator.

## 2022-02-09 NOTE — DISCHARGE NOTE PROVIDER - DETAILS OF MALNUTRITION DIAGNOSIS/DIAGNOSES
This patient has been assessed with a concern for Malnutrition and was treated during this hospitalization for the following Nutrition diagnosis/diagnoses:     -  02/01/2022: Severe protein-calorie malnutrition   -  02/01/2022: Underweight (BMI < 19)
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
## 2022-02-10 ENCOUNTER — TRANSCRIPTION ENCOUNTER (OUTPATIENT)
Age: 52
End: 2022-02-10

## 2022-02-10 PROBLEM — K59.00 CONSTIPATION, UNSPECIFIED: Chronic | Status: ACTIVE | Noted: 2022-02-01

## 2022-02-10 LAB — SARS-COV-2 RNA SPEC QL NAA+PROBE: NEGATIVE — SIGNIFICANT CHANGE UP

## 2022-02-10 PROCEDURE — 99232 SBSQ HOSP IP/OBS MODERATE 35: CPT

## 2022-02-10 PROCEDURE — 73590 X-RAY EXAM OF LOWER LEG: CPT | Mod: 26,LT

## 2022-02-10 RX ORDER — CEFTRIAXONE 500 MG/1
1000 INJECTION, POWDER, FOR SOLUTION INTRAMUSCULAR; INTRAVENOUS EVERY 24 HOURS
Refills: 0 | Status: DISCONTINUED | OUTPATIENT
Start: 2022-02-10 | End: 2022-02-11

## 2022-02-10 RX ORDER — ACETAMINOPHEN 500 MG
650 TABLET ORAL EVERY 6 HOURS
Refills: 0 | Status: DISCONTINUED | OUTPATIENT
Start: 2022-02-10 | End: 2022-02-14

## 2022-02-10 RX ORDER — ENOXAPARIN SODIUM 100 MG/ML
40 INJECTION SUBCUTANEOUS EVERY 24 HOURS
Refills: 0 | Status: DISCONTINUED | OUTPATIENT
Start: 2022-02-10 | End: 2022-02-14

## 2022-02-10 RX ORDER — IBUPROFEN 200 MG
400 TABLET ORAL ONCE
Refills: 0 | Status: COMPLETED | OUTPATIENT
Start: 2022-02-10 | End: 2022-02-10

## 2022-02-10 RX ORDER — NYSTATIN CREAM 100000 [USP'U]/G
1 CREAM TOPICAL
Refills: 0 | Status: DISCONTINUED | OUTPATIENT
Start: 2022-02-10 | End: 2022-02-14

## 2022-02-10 RX ADMIN — Medication 5 MILLIGRAM(S): at 22:23

## 2022-02-10 RX ADMIN — Medication 20 MILLIGRAM(S): at 06:58

## 2022-02-10 RX ADMIN — NYSTATIN CREAM 1 APPLICATION(S): 100000 CREAM TOPICAL at 06:55

## 2022-02-10 RX ADMIN — Medication 20 MILLIGRAM(S): at 22:22

## 2022-02-10 RX ADMIN — Medication 1 TABLET(S): at 11:20

## 2022-02-10 RX ADMIN — Medication 400 MILLIGRAM(S): at 12:00

## 2022-02-10 RX ADMIN — Medication 25 MILLIGRAM(S): at 22:22

## 2022-02-10 RX ADMIN — Medication 400 MILLIGRAM(S): at 11:20

## 2022-02-10 RX ADMIN — Medication 5 MILLIGRAM(S): at 06:56

## 2022-02-10 RX ADMIN — GABAPENTIN 300 MILLIGRAM(S): 400 CAPSULE ORAL at 06:55

## 2022-02-10 RX ADMIN — CYCLOBENZAPRINE HYDROCHLORIDE 5 MILLIGRAM(S): 10 TABLET, FILM COATED ORAL at 02:53

## 2022-02-10 RX ADMIN — Medication 10 MILLIGRAM(S): at 22:23

## 2022-02-10 RX ADMIN — Medication 500 MILLIGRAM(S): at 11:20

## 2022-02-10 RX ADMIN — GABAPENTIN 300 MILLIGRAM(S): 400 CAPSULE ORAL at 22:23

## 2022-02-10 RX ADMIN — Medication 5 MILLIGRAM(S): at 18:27

## 2022-02-10 RX ADMIN — ENOXAPARIN SODIUM 40 MILLIGRAM(S): 100 INJECTION SUBCUTANEOUS at 10:05

## 2022-02-10 RX ADMIN — NYSTATIN CREAM 1 APPLICATION(S): 100000 CREAM TOPICAL at 18:26

## 2022-02-10 RX ADMIN — GABAPENTIN 300 MILLIGRAM(S): 400 CAPSULE ORAL at 14:27

## 2022-02-10 RX ADMIN — Medication 20 MILLIGRAM(S): at 14:27

## 2022-02-10 RX ADMIN — Medication 25 MILLIGRAM(S): at 00:53

## 2022-02-10 RX ADMIN — SENNA PLUS 2 TABLET(S): 8.6 TABLET ORAL at 22:22

## 2022-02-10 NOTE — PROGRESS NOTE ADULT - PROBLEM SELECTOR PLAN 3
after working with PT today. Ankle with no TTP, no edema.  - Xray ordered by ED provider  - pain control as below after working with PT today. Ankle with no TTP, no edema.  - Xray ordered by ED provider  - tylenol 650 q6hrs PRN   - additional pain control as below

## 2022-02-10 NOTE — DISCHARGE NOTE PROVIDER - NSDCMRMEDTOKEN_GEN_ALL_CORE_FT
AMITRIPTYLINE 25MG TABLETS: 1 tab(s) orally once a day (at bedtime)  ascorbic acid 500 mg oral tablet: 1 tab(s) orally once a day  BACLOFEN 20MG TAB: 1 tab(s) orally every 8 hours  BISACODYL 10MG SUPPOSITORIES: 1 each rectal once a day (at bedtime), As Needed  cyclobenzaprine 5 mg oral tablet: 1 tab(s) orally 3 times a day, As needed, Muscle Spasm  dronabinol 5 mg oral capsule: 1 cap(s) orally every 12 hours  enoxaparin: 40 milligram(s) for dvt ppx  GABAPENTIN 300MG CAP: 1 cap(s) orally every 8 hours  melatonin 5 mg oral tablet: 1 tab(s) orally once a day (at bedtime)  Multiple Vitamins oral tablet: 1 tab(s) orally once a day  nystatin 100,000 units/g topical powder: 1 application topically 2 times a day  polyethylene glycol 3350 oral powder for reconstitution: 17 gram(s) orally once a day  senna oral tablet: 2 tab(s) orally every 24 hours  zinc sulfate 220 mg oral capsule: 1 cap(s) orally once a day   AMITRIPTYLINE 25MG TABLETS: 1 tab(s) orally once a day (at bedtime)  ascorbic acid 500 mg oral tablet: 1 tab(s) orally once a day  BACLOFEN 20MG TAB: 1 tab(s) orally every 8 hours  BISACODYL 10MG SUPPOSITORIES: 1 each rectal once a day (at bedtime), As Needed for constipation  cefpodoxime 100 mg oral tablet: 1 tab(s) orally every 12 hours  cyclobenzaprine 5 mg oral tablet: 1 tab(s) orally 3 times a day, As needed, Muscle Spasm  dronabinol 5 mg oral capsule: 1 cap(s) orally every 12 hours  enoxaparin: 40 milligram(s) for dvt ppx  gabapentin 300 mg oral capsule: 1 cap(s) orally every 24 hours  gabapentin 300 mg oral capsule: 1 cap(s) orally every 24 hours  gabapentin 600 mg oral tablet: 1 tab(s) orally every 24 hours  melatonin 5 mg oral tablet: 1 tab(s) orally once a day (at bedtime)  Multiple Vitamins oral tablet: 1 tab(s) orally once a day  nystatin 100,000 units/g topical powder: 1 application topically 2 times a day  polyethylene glycol 3350 oral powder for reconstitution: 17 gram(s) orally once a day  senna oral tablet: 2 tab(s) orally every 24 hours  zinc sulfate 220 mg oral capsule: 1 cap(s) orally once a day

## 2022-02-10 NOTE — DISCHARGE NOTE PROVIDER - PROVIDER TOKENS
FREE:[LAST:[Navi],FIRST:[Kaley],PHONE:[(   )    -],FAX:[(   )    -],ADDRESS:[Primary Care Provider,   Nurse Practitioner Kaley Mcelroy at   [address] [place] [address], [address] [place] [address]],SCHEDULEDAPPT:[03/28/2022],SCHEDULEDAPPTTIME:[03:20 PM]]

## 2022-02-10 NOTE — PATIENT PROFILE ADULT - FALL HARM RISK - HARM RISK INTERVENTIONS

## 2022-02-10 NOTE — DISCHARGE NOTE PROVIDER - NSDCFUADDAPPT_GEN_ALL_CORE_FT
Please bring your Insurance card, Photo ID and Discharge paperwork to the following appointment:    (1) Please follow up with your Primary Care Provider, Nurse Practitioner Kaley Mcelroy at [address] [place] [address], [address] [place] [address] on 03/28/2022 at 3:20pm.    Please note that the office will contact you for an earlier appointment once available.    Appointment was scheduled by Ms. JANEEN Farley, Referral Coordinator.    Regular Diet - No restrictions  No restrictions
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
## 2022-02-10 NOTE — PHYSICAL THERAPY INITIAL EVALUATION ADULT - ADDITIONAL COMMENTS
Patient presents from [facility]. Per chart review, patient currently resides alone in elevator apartment. Bedbound "for many years". Had 24/7 caregiver for 2 years until recently "cancelled service", which brought her admission to rehab. Caregiver assisted w/ home management, meals, cleaning, cooking, bathing, dressing, and transfers onto wheelchair. Pt. comes from rehab. Pt states she transfers via macho lift or scoots, states she uses built up utensils and needs assist with ADL. Patient presents from [facility]. Per chart review, patient previously resides alone in elevator apartment. Bedbound "for many years". Had 24/7 caregiver for 2 years until recently "cancelled service", which brought her admission to rehab. Caregiver assisted w/ home management, meals, cleaning, cooking, bathing, dressing, and transfers onto wheelchair. Pt. comes from rehab. Pt states she transfers via macho lift or scoots, states she uses built up utensils and needs assist with ADL.
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
## 2022-02-10 NOTE — DIETITIAN INITIAL EVALUATION ADULT. - ORAL INTAKE PTA/DIET HISTORY
Patient with limited feedback.Patient had reported she has someone who cooks for her.No specific allergies or avoidances.Patient requesting Ensure Plant supplement

## 2022-02-10 NOTE — OCCUPATIONAL THERAPY INITIAL EVALUATION ADULT - GENERAL OBSERVATIONS, REHAB EVAL
Pt received semi-supine in bed, +heplock, +b/l multi-podus boots, in NAD and agreeable to OT. Cleared by DANICA Randle to see.
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
## 2022-02-10 NOTE — DISCHARGE NOTE PROVIDER - CARE PROVIDER_API CALL
Kaley Mcelroy  Primary Care Provider,   Nurse Practitioner Kaley Mcelroy at   [address] [place] [address], [address] Floor, [place], [place] [id number]  Phone: (   )    -  Fax: (   )    -  Scheduled Appointment: 03/28/2022 03:20 PM

## 2022-02-10 NOTE — PHYSICAL THERAPY INITIAL EVALUATION ADULT - DIAGNOSIS, PT EVAL
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

## 2022-02-10 NOTE — PROGRESS NOTE ADULT - SUBJECTIVE AND OBJECTIVE BOX
INTERVAL HPI/OVERNIGHT EVENTS:    SUBJECTIVE: Patient seen and examined at bedside.     OBJECTIVE:    VITAL SIGNS:  ICU Vital Signs Last 24 Hrs  T(C): 36.3 (10 Feb 2022 06:34), Max: 36.8 (09 Feb 2022 11:39)  T(F): 97.3 (10 Feb 2022 06:34), Max: 98.3 (09 Feb 2022 11:39)  HR: 73 (10 Feb 2022 06:34) (61 - 98)  BP: 108/75 (10 Feb 2022 06:34) (98/72 - 163/95)  BP(mean): --  ABP: --  ABP(mean): --  RR: 18 (10 Feb 2022 06:34) (18 - 19)  SpO2: 97% (10 Feb 2022 06:34) (96% - 99%)        CAPILLARY BLOOD GLUCOSE          PHYSICAL EXAM:    Constitutional: NAD, well-groomed, well-developed  HEENT: PERRLA, EOMI, Normal Hearing, MMM  Neck: No LAD, No JVD  Back: Normal spine flexure, No CVA tenderness  Respiratory: CTAB   Cardiovascular: S1 and S2, RRR, no M/G/R  Gastrointestinal: BS+, soft, NT/ND  Extremities: No peripheral edema  Vascular: 2+ peripheral pulses  Neurological: A/O x 3, no focal deficits  Psychiatric: Normal mood, normal affect  Musculoskeletal: 5/5 strength b/l upper and lower extremities  Skin: No rashes    MEDICATIONS:  MEDICATIONS  (STANDING):  amitriptyline 25 milliGRAM(s) Oral at bedtime  ascorbic acid 500 milliGRAM(s) Oral daily  baclofen 20 milliGRAM(s) Oral every 8 hours  dronabinol 5 milliGRAM(s) Oral every 12 hours  enoxaparin Injectable 40 milliGRAM(s) SubCutaneous every 24 hours  gabapentin 300 milliGRAM(s) Oral every 8 hours  melatonin 5 milliGRAM(s) Oral at bedtime  multivitamin 1 Tablet(s) Oral daily  nystatin Powder 1 Application(s) Topical two times a day  polyethylene glycol 3350 17 Gram(s) Oral daily  senna 2 Tablet(s) Oral at bedtime  zinc sulfate 220 milliGRAM(s) Oral daily    MEDICATIONS  (PRN):  bisacodyl Suppository 10 milliGRAM(s) Rectal at bedtime PRN Constipation  cyclobenzaprine 5 milliGRAM(s) Oral three times a day PRN Muscle Spasm      ALLERGIES:  Allergies    No Known Allergies    Intolerances        LABS:                        11.2   5.10  )-----------( 235      ( 09 Feb 2022 08:10 )             36.7     02-09    141  |  107  |  12  ----------------------------<  88  3.8   |  23  |  0.26<L>    Ca    9.4      09 Feb 2022 08:10  Phos  4.6     02-09  Mg     1.8     02-09            RADIOLOGY & ADDITIONAL TESTS: Reviewed. INTERVAL HPI/OVERNIGHT EVENTS: NAOE     SUBJECTIVE: Patient seen and examined at bedside. Patient reporting left lower extremity pain, and lack of sleep due to noises coming from the other patient in the room.     OBJECTIVE:    VITAL SIGNS:  ICU Vital Signs Last 24 Hrs  T(C): 36.3 (10 Feb 2022 06:34), Max: 36.8 (09 Feb 2022 11:39)  T(F): 97.3 (10 Feb 2022 06:34), Max: 98.3 (09 Feb 2022 11:39)  HR: 73 (10 Feb 2022 06:34) (61 - 98)  BP: 108/75 (10 Feb 2022 06:34) (98/72 - 163/95)  BP(mean): --  ABP: --  ABP(mean): --  RR: 18 (10 Feb 2022 06:34) (18 - 19)  SpO2: 97% (10 Feb 2022 06:34) (96% - 99%)        CAPILLARY BLOOD GLUCOSE          PHYSICAL EXAM:    GENERAL: NAD, cooperative   HEENT: NC/AT, PERRL, EOMI, anicteric sclera, no nasal discharge; uvula midline, no oropharyngeal erythema or exudates; MMM  Neck: supple; no JVD   Respiratory: CTA B/L; no W/R/R  Cardiac: +S1/S2; RRR; no M/R/G  Gastrointestinal: soft NT, ND; no rebound or guarding; +BSx4  Extremities: WWP, no clubbing or cyanosis; no peripheral edema  Musculoskeletal: 0/5 strength testing of lower extremities, patient reports cannot wiggle toes; 3/5 UE flexion; 1+/5 UE extension  Vascular: 2+ radial, DP pulses B/L  Dermatologic: skin warm and dry  Neurologic: AAOx3    MEDICATIONS:  MEDICATIONS  (STANDING):  amitriptyline 25 milliGRAM(s) Oral at bedtime  ascorbic acid 500 milliGRAM(s) Oral daily  baclofen 20 milliGRAM(s) Oral every 8 hours  dronabinol 5 milliGRAM(s) Oral every 12 hours  enoxaparin Injectable 40 milliGRAM(s) SubCutaneous every 24 hours  gabapentin 300 milliGRAM(s) Oral every 8 hours  melatonin 5 milliGRAM(s) Oral at bedtime  multivitamin 1 Tablet(s) Oral daily  nystatin Powder 1 Application(s) Topical two times a day  polyethylene glycol 3350 17 Gram(s) Oral daily  senna 2 Tablet(s) Oral at bedtime  zinc sulfate 220 milliGRAM(s) Oral daily    MEDICATIONS  (PRN):  bisacodyl Suppository 10 milliGRAM(s) Rectal at bedtime PRN Constipation  cyclobenzaprine 5 milliGRAM(s) Oral three times a day PRN Muscle Spasm      ALLERGIES:  Allergies    No Known Allergies    Intolerances        LABS:                        11.2   5.10  )-----------( 235      ( 09 Feb 2022 08:10 )             36.7     02-09    141  |  107  |  12  ----------------------------<  88  3.8   |  23  |  0.26<L>    Ca    9.4      09 Feb 2022 08:10  Phos  4.6     02-09  Mg     1.8     02-09            RADIOLOGY & ADDITIONAL TESTS: Reviewed.

## 2022-02-10 NOTE — ED ADULT NURSE NOTE - NS ED NURSE LEVEL OF CONSCIOUSNESS SPEECH
12/25/19 1431   Post-Acute Status   Post-Acute Authorization   (Home with family; patient will schedule follow-ups)   Discharge Delays None known at this time      Speaking Coherently

## 2022-02-10 NOTE — OCCUPATIONAL THERAPY INITIAL EVALUATION ADULT - ADDITIONAL COMMENTS
Pt recent re-admit from [facility]. Prior to hospitalizations pt resides alone in an apartment with elevator access. Bedbound "for many years", had 24/7 caregiver for 2 years until recently "cancelled service", which brought her 1st admission to rehab and then pt states she was sent home with caregivers for only a couple of hours. Caregivers assisted w/ home management, meals, cleaning, cooking, bathing, dressing, and transfers onto wheelchair. Pt has a hospital bed, states she transfers via macho lift or scoots. Pt states that caregivers sponge bathe her in bed, she is able to brush her teeth and feed self using universal cuff with setup.
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
## 2022-02-10 NOTE — DIETITIAN INITIAL EVALUATION ADULT. - OTHER INFO
50y/o female C4 quadriplegia( wheelchair bound),bladder CA with resection and urostomy re-admitted from [facility].Patient this am, stated she was not hungry and did not want breakfast.No N/V/D.BM 2/8.Skin with right ankle stage III and Decub ulcer(?stage).UBW:135lbs, Present weight:115lbs,BMI:18,85% of IBW,20lb weight loss (14.8%) in 2 months identified at severe PCM due to weight loss and suspected inconsistent energy intake.RD will request Ensure Plant based BID(510kcal/60gmprotein)

## 2022-02-10 NOTE — DIETITIAN INITIAL EVALUATION ADULT. - PHYSCIAL ASSESSMENT
noted loss of muscle and fat with 20lb weight loss in 2 months reported.RD deferred complete physical assessment at this time due to patients request to sleep debilitated

## 2022-02-10 NOTE — DISCHARGE NOTE PROVIDER - DETAILS OF MALNUTRITION DIAGNOSIS/DIAGNOSES
This patient has been assessed with a concern for Malnutrition and was treated during this hospitalization for the following Nutrition diagnosis/diagnoses:     -  02/10/2022: Severe protein-calorie malnutrition   -  02/10/2022: Underweight (BMI < 19)
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
## 2022-02-10 NOTE — PHYSICAL THERAPY INITIAL EVALUATION ADULT - BED MOBILITY LIMITATIONS, REHAB EVAL
Demo impaired strength with bed mobility benefiting from second person assist for trunk control and BLE advancement 2/2 BLE weakness and reports of L ankle pain/decreased ability to use arms for pushing/pulling/decreased ability to use legs for bridging/pushing/impaired ability to control trunk for mobility

## 2022-02-10 NOTE — OCCUPATIONAL THERAPY INITIAL EVALUATION ADULT - MODALITIES TREATMENT COMMENTS
Pt re-admitted from [facility]. Pt with c/o of new L ankle pain, team aware, despite max encouragement pt declined to sit up at EOB for initial OT evaluation, only agreeable for rolling in bed, dependent x2 for rolling. Pt is a quadriplegic, able to demonstrate some shoulder and elbow movements to complete grooming task with universal cuff. Pt is at baseline as she receives 24/7 care for ADLs and dependent via macho lift for transfers. No further acute OT needs, discussed case with MELBA Blackburn. Pt will be d/c from OT at this time.

## 2022-02-11 DIAGNOSIS — L89.519 PRESSURE ULCER OF RIGHT ANKLE, UNSPECIFIED STAGE: ICD-10-CM

## 2022-02-11 DIAGNOSIS — N30.90 CYSTITIS, UNSPECIFIED WITHOUT HEMATURIA: ICD-10-CM

## 2022-02-11 DIAGNOSIS — G82.50 QUADRIPLEGIA, UNSPECIFIED: ICD-10-CM

## 2022-02-11 DIAGNOSIS — G62.9 POLYNEUROPATHY, UNSPECIFIED: ICD-10-CM

## 2022-02-11 DIAGNOSIS — K59.00 CONSTIPATION, UNSPECIFIED: ICD-10-CM

## 2022-02-11 DIAGNOSIS — R10.9 UNSPECIFIED ABDOMINAL PAIN: ICD-10-CM

## 2022-02-11 DIAGNOSIS — E43 UNSPECIFIED SEVERE PROTEIN-CALORIE MALNUTRITION: ICD-10-CM

## 2022-02-11 LAB
ANION GAP SERPL CALC-SCNC: 10 MMOL/L — SIGNIFICANT CHANGE UP (ref 5–17)
APPEARANCE UR: ABNORMAL
BACTERIA # UR AUTO: ABNORMAL /HPF
BASOPHILS # BLD AUTO: 0.03 K/UL — SIGNIFICANT CHANGE UP (ref 0–0.2)
BASOPHILS NFR BLD AUTO: 0.7 % — SIGNIFICANT CHANGE UP (ref 0–2)
BILIRUB UR-MCNC: NEGATIVE — SIGNIFICANT CHANGE UP
BUN SERPL-MCNC: 21 MG/DL — SIGNIFICANT CHANGE UP (ref 7–23)
CALCIUM SERPL-MCNC: 9.2 MG/DL — SIGNIFICANT CHANGE UP (ref 8.4–10.5)
CHLORIDE SERPL-SCNC: 102 MMOL/L — SIGNIFICANT CHANGE UP (ref 96–108)
CO2 SERPL-SCNC: 24 MMOL/L — SIGNIFICANT CHANGE UP (ref 22–31)
COLOR SPEC: YELLOW — SIGNIFICANT CHANGE UP
COMMENT - URINE: SIGNIFICANT CHANGE UP
CREAT SERPL-MCNC: 0.41 MG/DL — LOW (ref 0.5–1.3)
DIFF PNL FLD: NEGATIVE — SIGNIFICANT CHANGE UP
EOSINOPHIL # BLD AUTO: 0.3 K/UL — SIGNIFICANT CHANGE UP (ref 0–0.5)
EOSINOPHIL NFR BLD AUTO: 6.7 % — HIGH (ref 0–6)
EPI CELLS # UR: SIGNIFICANT CHANGE UP /HPF (ref 0–5)
GLUCOSE SERPL-MCNC: 92 MG/DL — SIGNIFICANT CHANGE UP (ref 70–99)
GLUCOSE UR QL: NEGATIVE — SIGNIFICANT CHANGE UP
HCT VFR BLD CALC: 32.7 % — LOW (ref 34.5–45)
HGB BLD-MCNC: 10.3 G/DL — LOW (ref 11.5–15.5)
IMM GRANULOCYTES NFR BLD AUTO: 0.2 % — SIGNIFICANT CHANGE UP (ref 0–1.5)
KETONES UR-MCNC: NEGATIVE — SIGNIFICANT CHANGE UP
LEUKOCYTE ESTERASE UR-ACNC: NEGATIVE — SIGNIFICANT CHANGE UP
LYMPHOCYTES # BLD AUTO: 1.75 K/UL — SIGNIFICANT CHANGE UP (ref 1–3.3)
LYMPHOCYTES # BLD AUTO: 38.8 % — SIGNIFICANT CHANGE UP (ref 13–44)
MCHC RBC-ENTMCNC: 30.6 PG — SIGNIFICANT CHANGE UP (ref 27–34)
MCHC RBC-ENTMCNC: 31.5 GM/DL — LOW (ref 32–36)
MCV RBC AUTO: 97 FL — SIGNIFICANT CHANGE UP (ref 80–100)
MONOCYTES # BLD AUTO: 0.33 K/UL — SIGNIFICANT CHANGE UP (ref 0–0.9)
MONOCYTES NFR BLD AUTO: 7.3 % — SIGNIFICANT CHANGE UP (ref 2–14)
NEUTROPHILS # BLD AUTO: 2.09 K/UL — SIGNIFICANT CHANGE UP (ref 1.8–7.4)
NEUTROPHILS NFR BLD AUTO: 46.3 % — SIGNIFICANT CHANGE UP (ref 43–77)
NITRITE UR-MCNC: POSITIVE
NRBC # BLD: 0 /100 WBCS — SIGNIFICANT CHANGE UP (ref 0–0)
PH UR: 8 — SIGNIFICANT CHANGE UP (ref 5–8)
PLATELET # BLD AUTO: 244 K/UL — SIGNIFICANT CHANGE UP (ref 150–400)
POTASSIUM SERPL-MCNC: 3.8 MMOL/L — SIGNIFICANT CHANGE UP (ref 3.5–5.3)
POTASSIUM SERPL-SCNC: 3.8 MMOL/L — SIGNIFICANT CHANGE UP (ref 3.5–5.3)
PROT UR-MCNC: NEGATIVE MG/DL — SIGNIFICANT CHANGE UP
RBC # BLD: 3.37 M/UL — LOW (ref 3.8–5.2)
RBC # FLD: 13.6 % — SIGNIFICANT CHANGE UP (ref 10.3–14.5)
RBC CASTS # UR COMP ASSIST: < 5 /HPF — SIGNIFICANT CHANGE UP
SODIUM SERPL-SCNC: 136 MMOL/L — SIGNIFICANT CHANGE UP (ref 135–145)
SP GR SPEC: 1.01 — SIGNIFICANT CHANGE UP (ref 1–1.03)
TRI-PHOS CRY UR QL COMP ASSIST: ABNORMAL /HPF
UROBILINOGEN FLD QL: 1 E.U./DL — SIGNIFICANT CHANGE UP
WBC # BLD: 4.51 K/UL — SIGNIFICANT CHANGE UP (ref 3.8–10.5)
WBC # FLD AUTO: 4.51 K/UL — SIGNIFICANT CHANGE UP (ref 3.8–10.5)
WBC UR QL: ABNORMAL /HPF

## 2022-02-11 PROCEDURE — 36000 PLACE NEEDLE IN VEIN: CPT

## 2022-02-11 PROCEDURE — 76937 US GUIDE VASCULAR ACCESS: CPT | Mod: 26,59

## 2022-02-11 PROCEDURE — 99232 SBSQ HOSP IP/OBS MODERATE 35: CPT

## 2022-02-11 RX ORDER — CEFPODOXIME PROXETIL 100 MG
100 TABLET ORAL EVERY 12 HOURS
Refills: 0 | Status: DISCONTINUED | OUTPATIENT
Start: 2022-02-11 | End: 2022-02-14

## 2022-02-11 RX ORDER — POTASSIUM CHLORIDE 20 MEQ
20 PACKET (EA) ORAL ONCE
Refills: 0 | Status: COMPLETED | OUTPATIENT
Start: 2022-02-11 | End: 2022-02-11

## 2022-02-11 RX ORDER — POLYETHYLENE GLYCOL 3350 17 G/17G
17 POWDER, FOR SOLUTION ORAL ONCE
Refills: 0 | Status: COMPLETED | OUTPATIENT
Start: 2022-02-11 | End: 2022-02-11

## 2022-02-11 RX ADMIN — Medication 5 MILLIGRAM(S): at 17:48

## 2022-02-11 RX ADMIN — SENNA PLUS 2 TABLET(S): 8.6 TABLET ORAL at 21:36

## 2022-02-11 RX ADMIN — Medication 5 MILLIGRAM(S): at 05:36

## 2022-02-11 RX ADMIN — POLYETHYLENE GLYCOL 3350 17 GRAM(S): 17 POWDER, FOR SOLUTION ORAL at 05:36

## 2022-02-11 RX ADMIN — ENOXAPARIN SODIUM 40 MILLIGRAM(S): 100 INJECTION SUBCUTANEOUS at 09:42

## 2022-02-11 RX ADMIN — Medication 20 MILLIGRAM(S): at 21:36

## 2022-02-11 RX ADMIN — Medication 20 MILLIGRAM(S): at 05:36

## 2022-02-11 RX ADMIN — NYSTATIN CREAM 1 APPLICATION(S): 100000 CREAM TOPICAL at 18:33

## 2022-02-11 RX ADMIN — GABAPENTIN 300 MILLIGRAM(S): 400 CAPSULE ORAL at 05:36

## 2022-02-11 RX ADMIN — Medication 20 MILLIEQUIVALENT(S): at 14:06

## 2022-02-11 RX ADMIN — Medication 5 MILLIGRAM(S): at 21:35

## 2022-02-11 RX ADMIN — GABAPENTIN 300 MILLIGRAM(S): 400 CAPSULE ORAL at 21:36

## 2022-02-11 RX ADMIN — GABAPENTIN 300 MILLIGRAM(S): 400 CAPSULE ORAL at 14:06

## 2022-02-11 RX ADMIN — NYSTATIN CREAM 1 APPLICATION(S): 100000 CREAM TOPICAL at 05:36

## 2022-02-11 RX ADMIN — Medication 100 MILLIGRAM(S): at 19:22

## 2022-02-11 RX ADMIN — Medication 25 MILLIGRAM(S): at 21:36

## 2022-02-11 RX ADMIN — CEFTRIAXONE 100 MILLIGRAM(S): 500 INJECTION, POWDER, FOR SOLUTION INTRAMUSCULAR; INTRAVENOUS at 00:24

## 2022-02-11 RX ADMIN — Medication 20 MILLIGRAM(S): at 14:06

## 2022-02-11 RX ADMIN — Medication 1 TABLET(S): at 13:21

## 2022-02-11 RX ADMIN — Medication 500 MILLIGRAM(S): at 13:21

## 2022-02-11 RX ADMIN — ZINC SULFATE TAB 220 MG (50 MG ZINC EQUIVALENT) 220 MILLIGRAM(S): 220 (50 ZN) TAB at 13:20

## 2022-02-11 NOTE — PROGRESS NOTE ADULT - SUBJECTIVE AND OBJECTIVE BOX
JUDI called sister, Ángela, informed of transport time. JUDI talked with Deysi CINTRON [phone] updated of transport. Requested she inform patient of transport.     Completed COBRA, transfer packet, PASRR completed by JUDI, Lia, tt. Dr. Gillette for D/C summary and orders, packet given to Deysi CINTRON.    JUDI tt. Salina Estrada of [facility] to update.    PLAN: Patient to D/C to SNF: [facility]. Authorization pending for [facility] pending still. Once authorization is compete patient is able to transport to [facility] if he wants.    INTERVAL HPI/OVERNIGHT EVENTS:    SUBJECTIVE: Patient seen and examined at bedside.     OBJECTIVE:    VITAL SIGNS:  ICU Vital Signs Last 24 Hrs  T(C): 36.3 (2022 06:04), Max: 36.6 (10 Feb 2022 21:24)  T(F): 97.3 (2022 06:04), Max: 97.8 (10 Feb 2022 21:24)  HR: 59 (2022 06:04) (59 - 70)  BP: 108/75 (2022 06:04) (92/56 - 108/75)  BP(mean): 80 (10 Feb 2022 21:24) (80 - 80)  ABP: --  ABP(mean): --  RR: 17 (2022 06:04) (16 - 18)  SpO2: 98% (2022 06:04) (97% - 98%)        02-10 @ 07:01  -  02-11 @ 07:00  --------------------------------------------------------  IN: 400 mL / OUT: 1000 mL / NET: -600 mL      CAPILLARY BLOOD GLUCOSE          PHYSICAL EXAM:    Constitutional: NAD, well-groomed, well-developed  HEENT: PERRLA, EOMI, Normal Hearing, MMM  Neck: No LAD, No JVD  Back: Normal spine flexure, No CVA tenderness  Respiratory: CTAB   Cardiovascular: S1 and S2, RRR, no M/G/R  Gastrointestinal: BS+, soft, NT/ND  Extremities: No peripheral edema  Vascular: 2+ peripheral pulses  Neurological: A/O x 3, no focal deficits  Psychiatric: Normal mood, normal affect  Musculoskeletal: 5/5 strength b/l upper and lower extremities  Skin: No rashes    MEDICATIONS:  MEDICATIONS  (STANDING):  amitriptyline 25 milliGRAM(s) Oral at bedtime  ascorbic acid 500 milliGRAM(s) Oral daily  baclofen 20 milliGRAM(s) Oral every 8 hours  cefTRIAXone   IVPB 1000 milliGRAM(s) IV Intermittent every 24 hours  dronabinol 5 milliGRAM(s) Oral every 12 hours  enoxaparin Injectable 40 milliGRAM(s) SubCutaneous every 24 hours  gabapentin 300 milliGRAM(s) Oral every 8 hours  melatonin 5 milliGRAM(s) Oral at bedtime  multivitamin 1 Tablet(s) Oral daily  nystatin Powder 1 Application(s) Topical two times a day  polyethylene glycol 3350 17 Gram(s) Oral daily  senna 2 Tablet(s) Oral at bedtime  zinc sulfate 220 milliGRAM(s) Oral daily    MEDICATIONS  (PRN):  acetaminophen     Tablet .. 650 milliGRAM(s) Oral every 6 hours PRN Temp greater or equal to 38C (100.4F), Mild Pain (1 - 3)  bisacodyl Suppository 10 milliGRAM(s) Rectal at bedtime PRN Constipation  cyclobenzaprine 5 milliGRAM(s) Oral three times a day PRN Muscle Spasm      ALLERGIES:  Allergies    No Known Allergies    Intolerances        LABS:                        10.3   4.51  )-----------( 244      ( 2022 07:43 )             32.7             Urinalysis Basic - ( 2022 00:06 )    Color: Yellow / Appearance: Cloudy / S.015 / pH: x  Gluc: x / Ketone: NEGATIVE  / Bili: Negative / Urobili: 1.0 E.U./dL   Blood: x / Protein: NEGATIVE mg/dL / Nitrite: POSITIVE   Leuk Esterase: NEGATIVE / RBC: < 5 /HPF / WBC 5-10 /HPF   Sq Epi: x / Non Sq Epi: 0-5 /HPF / Bacteria: Many /HPF        RADIOLOGY & ADDITIONAL TESTS: Reviewed. INTERVAL HPI/OVERNIGHT EVENTS: NAOE    SUBJECTIVE: Patient seen and examined at bedside. Patient report improving in Lower ankle pain, and improvement in dysuria. No other complaints. Rest of ROS negative.     OBJECTIVE:    VITAL SIGNS:  ICU Vital Signs Last 24 Hrs  T(C): 36.3 (2022 06:04), Max: 36.6 (10 Feb 2022 21:24)  T(F): 97.3 (2022 06:04), Max: 97.8 (10 Feb 2022 21:24)  HR: 59 (2022 06:04) (59 - 70)  BP: 108/75 (2022 06:04) (92/56 - 108/75)  BP(mean): 80 (10 Feb 2022 21:24) (80 - 80)  ABP: --  ABP(mean): --  RR: 17 (2022 06:04) (16 - 18)  SpO2: 98% (2022 06:04) (97% - 98%)        -10 @ 07:01  -  02-11 @ 07:00  --------------------------------------------------------  IN: 400 mL / OUT: 1000 mL / NET: -600 mL      CAPILLARY BLOOD GLUCOSE          PHYSICAL EXAM:    GENERAL: NAD, cooperative   HEENT: NC/AT, PERRL, EOMI, anicteric sclera, no nasal discharge; uvula midline, no oropharyngeal erythema or exudates; MMM  Neck: supple; no JVD   Respiratory: CTA B/L; no W/R/R  Cardiac: +S1/S2; RRR; no M/R/G  Gastrointestinal: soft NT, ND; no rebound or guarding; +BSx4  Extremities: WWP, no clubbing or cyanosis; no peripheral edema  Musculoskeletal: 0/5 strength testing of lower extremities, patient reports cannot wiggle toes; 3/5 UE flexion; 1+/5 UE extension  Vascular: 2+ radial, DP pulses B/L  Dermatologic: skin warm and dry  Neurologic: AAOx3    MEDICATIONS:  MEDICATIONS  (STANDING):  amitriptyline 25 milliGRAM(s) Oral at bedtime  ascorbic acid 500 milliGRAM(s) Oral daily  baclofen 20 milliGRAM(s) Oral every 8 hours  cefTRIAXone   IVPB 1000 milliGRAM(s) IV Intermittent every 24 hours  dronabinol 5 milliGRAM(s) Oral every 12 hours  enoxaparin Injectable 40 milliGRAM(s) SubCutaneous every 24 hours  gabapentin 300 milliGRAM(s) Oral every 8 hours  melatonin 5 milliGRAM(s) Oral at bedtime  multivitamin 1 Tablet(s) Oral daily  nystatin Powder 1 Application(s) Topical two times a day  polyethylene glycol 3350 17 Gram(s) Oral daily  senna 2 Tablet(s) Oral at bedtime  zinc sulfate 220 milliGRAM(s) Oral daily    MEDICATIONS  (PRN):  acetaminophen     Tablet .. 650 milliGRAM(s) Oral every 6 hours PRN Temp greater or equal to 38C (100.4F), Mild Pain (1 - 3)  bisacodyl Suppository 10 milliGRAM(s) Rectal at bedtime PRN Constipation  cyclobenzaprine 5 milliGRAM(s) Oral three times a day PRN Muscle Spasm      ALLERGIES:  Allergies    No Known Allergies    Intolerances        LABS:                        10.3   4.51  )-----------( 244      ( 2022 07:43 )             32.7             Urinalysis Basic - ( 2022 00:06 )    Color: Yellow / Appearance: Cloudy / S.015 / pH: x  Gluc: x / Ketone: NEGATIVE  / Bili: Negative / Urobili: 1.0 E.U./dL   Blood: x / Protein: NEGATIVE mg/dL / Nitrite: POSITIVE   Leuk Esterase: NEGATIVE / RBC: < 5 /HPF / WBC 5-10 /HPF   Sq Epi: x / Non Sq Epi: 0-5 /HPF / Bacteria: Many /HPF        RADIOLOGY & ADDITIONAL TESTS: Reviewed.

## 2022-02-11 NOTE — PROCEDURE NOTE - NSICDXPROCEDURE_GEN_ALL_CORE_FT
PROCEDURES:  Ultrasound guidance for vascular access 11-Feb-2022 02:01:30  Shelby Roque  Insertion, needle, vein 11-Feb-2022 02:01:39  Shelby Roque

## 2022-02-12 PROCEDURE — 99232 SBSQ HOSP IP/OBS MODERATE 35: CPT

## 2022-02-12 RX ORDER — ACETAMINOPHEN 500 MG
1000 TABLET ORAL ONCE
Refills: 0 | Status: COMPLETED | OUTPATIENT
Start: 2022-02-12 | End: 2022-02-12

## 2022-02-12 RX ORDER — METHOCARBAMOL 500 MG/1
500 TABLET, FILM COATED ORAL ONCE
Refills: 0 | Status: DISCONTINUED | OUTPATIENT
Start: 2022-02-12 | End: 2022-02-12

## 2022-02-12 RX ORDER — OXYCODONE AND ACETAMINOPHEN 5; 325 MG/1; MG/1
1 TABLET ORAL ONCE
Refills: 0 | Status: DISCONTINUED | OUTPATIENT
Start: 2022-02-12 | End: 2022-02-12

## 2022-02-12 RX ORDER — SODIUM CHLORIDE 9 MG/ML
500 INJECTION, SOLUTION INTRAVENOUS ONCE
Refills: 0 | Status: COMPLETED | OUTPATIENT
Start: 2022-02-12 | End: 2022-02-12

## 2022-02-12 RX ADMIN — Medication 1000 MILLIGRAM(S): at 04:37

## 2022-02-12 RX ADMIN — ZINC SULFATE TAB 220 MG (50 MG ZINC EQUIVALENT) 220 MILLIGRAM(S): 220 (50 ZN) TAB at 12:54

## 2022-02-12 RX ADMIN — Medication 500 MILLIGRAM(S): at 12:55

## 2022-02-12 RX ADMIN — ENOXAPARIN SODIUM 40 MILLIGRAM(S): 100 INJECTION SUBCUTANEOUS at 09:21

## 2022-02-12 RX ADMIN — Medication 100 MILLIGRAM(S): at 19:13

## 2022-02-12 RX ADMIN — Medication 20 MILLIGRAM(S): at 06:30

## 2022-02-12 RX ADMIN — Medication 100 MILLIGRAM(S): at 07:20

## 2022-02-12 RX ADMIN — NYSTATIN CREAM 1 APPLICATION(S): 100000 CREAM TOPICAL at 06:33

## 2022-02-12 RX ADMIN — Medication 5 MILLIGRAM(S): at 22:20

## 2022-02-12 RX ADMIN — NYSTATIN CREAM 1 APPLICATION(S): 100000 CREAM TOPICAL at 19:14

## 2022-02-12 RX ADMIN — GABAPENTIN 300 MILLIGRAM(S): 400 CAPSULE ORAL at 12:57

## 2022-02-12 RX ADMIN — CYCLOBENZAPRINE HYDROCHLORIDE 5 MILLIGRAM(S): 10 TABLET, FILM COATED ORAL at 01:39

## 2022-02-12 RX ADMIN — SENNA PLUS 2 TABLET(S): 8.6 TABLET ORAL at 22:20

## 2022-02-12 RX ADMIN — Medication 5 MILLIGRAM(S): at 06:30

## 2022-02-12 RX ADMIN — Medication 400 MILLIGRAM(S): at 07:44

## 2022-02-12 RX ADMIN — SODIUM CHLORIDE 500 MILLILITER(S): 9 INJECTION, SOLUTION INTRAVENOUS at 14:41

## 2022-02-12 RX ADMIN — Medication 20 MILLIGRAM(S): at 12:58

## 2022-02-12 RX ADMIN — Medication 5 MILLIGRAM(S): at 19:13

## 2022-02-12 RX ADMIN — POLYETHYLENE GLYCOL 3350 17 GRAM(S): 17 POWDER, FOR SOLUTION ORAL at 12:54

## 2022-02-12 RX ADMIN — OXYCODONE AND ACETAMINOPHEN 1 TABLET(S): 5; 325 TABLET ORAL at 04:39

## 2022-02-12 RX ADMIN — GABAPENTIN 300 MILLIGRAM(S): 400 CAPSULE ORAL at 22:20

## 2022-02-12 RX ADMIN — Medication 1 TABLET(S): at 12:54

## 2022-02-12 RX ADMIN — GABAPENTIN 300 MILLIGRAM(S): 400 CAPSULE ORAL at 06:30

## 2022-02-12 RX ADMIN — Medication 25 MILLIGRAM(S): at 22:21

## 2022-02-12 RX ADMIN — Medication 20 MILLIGRAM(S): at 22:20

## 2022-02-12 RX ADMIN — OXYCODONE AND ACETAMINOPHEN 1 TABLET(S): 5; 325 TABLET ORAL at 03:10

## 2022-02-12 NOTE — PROGRESS NOTE ADULT - SUBJECTIVE AND OBJECTIVE BOX
INTERVAL HPI/OVERNIGHT EVENTS: NAOE    SUBJECTIVE: Patient seen and examined at bedside. Patient report improving in Lower ankle pain, and improvement in dysuria. No other complaints. Rest of ROS negative.     OBJECTIVE:    VITAL SIGNS:  reviewed          PHYSICAL EXAM:    GENERAL: NAD, cooperative   HEENT: NC/AT, EOMI  Neck: supple; no JVD   Respiratory: CTA B/l  Cardiac: +S1/S2; RRR;  Gastrointestinal: soft NT, ND  Extremities: no edema  Neurologic: AAOx3          LABS:  lab holiday

## 2022-02-12 NOTE — CHART NOTE - NSCHARTNOTEFT_GEN_A_CORE
pt with low BP ~70's/~50s systolic. Patient seen and examined at bedside. Exam at baseline and pt mentating well. No dizziness/light-headedness and states "I feel fine". rest of vitals 98F 60bpm 97RA Given 500cc bolus, patient had soup and tea, repeat 1 hr later 100/62 rest of vitals wnl

## 2022-02-13 LAB
ANION GAP SERPL CALC-SCNC: 13 MMOL/L — SIGNIFICANT CHANGE UP (ref 5–17)
BASOPHILS # BLD AUTO: 0.04 K/UL — SIGNIFICANT CHANGE UP (ref 0–0.2)
BASOPHILS NFR BLD AUTO: 0.9 % — SIGNIFICANT CHANGE UP (ref 0–2)
BUN SERPL-MCNC: 16 MG/DL — SIGNIFICANT CHANGE UP (ref 7–23)
CALCIUM SERPL-MCNC: 9.3 MG/DL — SIGNIFICANT CHANGE UP (ref 8.4–10.5)
CHLORIDE SERPL-SCNC: 107 MMOL/L — SIGNIFICANT CHANGE UP (ref 96–108)
CO2 SERPL-SCNC: 21 MMOL/L — LOW (ref 22–31)
CREAT SERPL-MCNC: 0.29 MG/DL — LOW (ref 0.5–1.3)
EOSINOPHIL # BLD AUTO: 0.3 K/UL — SIGNIFICANT CHANGE UP (ref 0–0.5)
EOSINOPHIL NFR BLD AUTO: 6.9 % — HIGH (ref 0–6)
GLUCOSE SERPL-MCNC: 85 MG/DL — SIGNIFICANT CHANGE UP (ref 70–99)
HCT VFR BLD CALC: 32.9 % — LOW (ref 34.5–45)
HGB BLD-MCNC: 10.4 G/DL — LOW (ref 11.5–15.5)
IMM GRANULOCYTES NFR BLD AUTO: 0.2 % — SIGNIFICANT CHANGE UP (ref 0–1.5)
LYMPHOCYTES # BLD AUTO: 1.69 K/UL — SIGNIFICANT CHANGE UP (ref 1–3.3)
LYMPHOCYTES # BLD AUTO: 38.8 % — SIGNIFICANT CHANGE UP (ref 13–44)
MAGNESIUM SERPL-MCNC: 1.8 MG/DL — SIGNIFICANT CHANGE UP (ref 1.6–2.6)
MCHC RBC-ENTMCNC: 31 PG — SIGNIFICANT CHANGE UP (ref 27–34)
MCHC RBC-ENTMCNC: 31.6 GM/DL — LOW (ref 32–36)
MCV RBC AUTO: 97.9 FL — SIGNIFICANT CHANGE UP (ref 80–100)
MONOCYTES # BLD AUTO: 0.32 K/UL — SIGNIFICANT CHANGE UP (ref 0–0.9)
MONOCYTES NFR BLD AUTO: 7.3 % — SIGNIFICANT CHANGE UP (ref 2–14)
NEUTROPHILS # BLD AUTO: 2 K/UL — SIGNIFICANT CHANGE UP (ref 1.8–7.4)
NEUTROPHILS NFR BLD AUTO: 45.9 % — SIGNIFICANT CHANGE UP (ref 43–77)
NRBC # BLD: 0 /100 WBCS — SIGNIFICANT CHANGE UP (ref 0–0)
PHOSPHATE SERPL-MCNC: 4.4 MG/DL — SIGNIFICANT CHANGE UP (ref 2.5–4.5)
PLATELET # BLD AUTO: 240 K/UL — SIGNIFICANT CHANGE UP (ref 150–400)
POTASSIUM SERPL-MCNC: 3.9 MMOL/L — SIGNIFICANT CHANGE UP (ref 3.5–5.3)
POTASSIUM SERPL-SCNC: 3.9 MMOL/L — SIGNIFICANT CHANGE UP (ref 3.5–5.3)
RBC # BLD: 3.36 M/UL — LOW (ref 3.8–5.2)
RBC # FLD: 13.6 % — SIGNIFICANT CHANGE UP (ref 10.3–14.5)
SODIUM SERPL-SCNC: 141 MMOL/L — SIGNIFICANT CHANGE UP (ref 135–145)
WBC # BLD: 4.36 K/UL — SIGNIFICANT CHANGE UP (ref 3.8–10.5)
WBC # FLD AUTO: 4.36 K/UL — SIGNIFICANT CHANGE UP (ref 3.8–10.5)

## 2022-02-13 PROCEDURE — 99232 SBSQ HOSP IP/OBS MODERATE 35: CPT

## 2022-02-13 RX ORDER — ACETAMINOPHEN 500 MG
1000 TABLET ORAL ONCE
Refills: 0 | Status: DISCONTINUED | OUTPATIENT
Start: 2022-02-13 | End: 2022-02-13

## 2022-02-13 RX ORDER — MAGNESIUM SULFATE 500 MG/ML
2 VIAL (ML) INJECTION ONCE
Refills: 0 | Status: COMPLETED | OUTPATIENT
Start: 2022-02-13 | End: 2022-02-14

## 2022-02-13 RX ORDER — ACETAMINOPHEN 500 MG
1000 TABLET ORAL ONCE
Refills: 0 | Status: COMPLETED | OUTPATIENT
Start: 2022-02-13 | End: 2022-02-13

## 2022-02-13 RX ORDER — GABAPENTIN 400 MG/1
300 CAPSULE ORAL DAILY
Refills: 0 | Status: DISCONTINUED | OUTPATIENT
Start: 2022-02-14 | End: 2022-02-14

## 2022-02-13 RX ORDER — GABAPENTIN 400 MG/1
300 CAPSULE ORAL DAILY
Refills: 0 | Status: DISCONTINUED | OUTPATIENT
Start: 2022-02-13 | End: 2022-02-14

## 2022-02-13 RX ORDER — KETOROLAC TROMETHAMINE 30 MG/ML
10 SYRINGE (ML) INJECTION EVERY 12 HOURS
Refills: 0 | Status: DISCONTINUED | OUTPATIENT
Start: 2022-02-13 | End: 2022-02-14

## 2022-02-13 RX ORDER — OXYCODONE HYDROCHLORIDE 5 MG/1
5 TABLET ORAL ONCE
Refills: 0 | Status: DISCONTINUED | OUTPATIENT
Start: 2022-02-13 | End: 2022-02-13

## 2022-02-13 RX ORDER — GABAPENTIN 400 MG/1
600 CAPSULE ORAL DAILY
Refills: 0 | Status: DISCONTINUED | OUTPATIENT
Start: 2022-02-13 | End: 2022-02-14

## 2022-02-13 RX ADMIN — OXYCODONE HYDROCHLORIDE 5 MILLIGRAM(S): 5 TABLET ORAL at 05:57

## 2022-02-13 RX ADMIN — Medication 5 MILLIGRAM(S): at 19:22

## 2022-02-13 RX ADMIN — GABAPENTIN 300 MILLIGRAM(S): 400 CAPSULE ORAL at 06:04

## 2022-02-13 RX ADMIN — SENNA PLUS 2 TABLET(S): 8.6 TABLET ORAL at 23:00

## 2022-02-13 RX ADMIN — Medication 100 MILLIGRAM(S): at 19:20

## 2022-02-13 RX ADMIN — Medication 10 MILLIGRAM(S): at 01:55

## 2022-02-13 RX ADMIN — Medication 1 TABLET(S): at 13:19

## 2022-02-13 RX ADMIN — ENOXAPARIN SODIUM 40 MILLIGRAM(S): 100 INJECTION SUBCUTANEOUS at 09:11

## 2022-02-13 RX ADMIN — OXYCODONE HYDROCHLORIDE 5 MILLIGRAM(S): 5 TABLET ORAL at 04:57

## 2022-02-13 RX ADMIN — Medication 20 MILLIGRAM(S): at 13:25

## 2022-02-13 RX ADMIN — Medication 25 MILLIGRAM(S): at 23:00

## 2022-02-13 RX ADMIN — GABAPENTIN 300 MILLIGRAM(S): 400 CAPSULE ORAL at 13:23

## 2022-02-13 RX ADMIN — Medication 400 MILLIGRAM(S): at 03:37

## 2022-02-13 RX ADMIN — Medication 100 MILLIGRAM(S): at 06:04

## 2022-02-13 RX ADMIN — NYSTATIN CREAM 1 APPLICATION(S): 100000 CREAM TOPICAL at 06:05

## 2022-02-13 RX ADMIN — Medication 5 MILLIGRAM(S): at 06:05

## 2022-02-13 RX ADMIN — Medication 20 MILLIGRAM(S): at 06:04

## 2022-02-13 RX ADMIN — Medication 20 MILLIGRAM(S): at 23:01

## 2022-02-13 RX ADMIN — ZINC SULFATE TAB 220 MG (50 MG ZINC EQUIVALENT) 220 MILLIGRAM(S): 220 (50 ZN) TAB at 13:23

## 2022-02-13 RX ADMIN — GABAPENTIN 600 MILLIGRAM(S): 400 CAPSULE ORAL at 23:00

## 2022-02-13 RX ADMIN — Medication 500 MILLIGRAM(S): at 13:19

## 2022-02-13 RX ADMIN — Medication 5 MILLIGRAM(S): at 23:00

## 2022-02-13 NOTE — PROGRESS NOTE ADULT - SUBJECTIVE AND OBJECTIVE BOX
INTERVAL HPI/OVERNIGHT EVENTS: IV tylenol and oxycodone 5mg IR for severe pain.    SUBJECTIVE: Patient seen and examined at bedside. Reports continuous sensation of pins and neededl through her body, that improved after PM meds. Patient reports improvement in dysuria. No further complaints.     OBJECTIVE:    VITAL SIGNS:  ICU Vital Signs Last 24 Hrs  T(C): 36.4 (13 Feb 2022 05:40), Max: 36.9 (12 Feb 2022 14:22)  T(F): 97.6 (13 Feb 2022 05:40), Max: 98.4 (12 Feb 2022 14:22)  HR: 64 (13 Feb 2022 05:40) (55 - 64)  BP: 119/81 (13 Feb 2022 05:40) (71/45 - 137/84)  BP(mean): --  ABP: --  ABP(mean): --  RR: 17 (13 Feb 2022 05:40) (17 - 18)  SpO2: 98% (13 Feb 2022 05:40) (98% - 98%)        02-12 @ 07:01  -  02-13 @ 07:00  --------------------------------------------------------  IN: 0 mL / OUT: 775 mL / NET: -775 mL      CAPILLARY BLOOD GLUCOSE          PHYSICAL EXAM:    GENERAL: NAD, cooperative   HEENT: NC/AT, PERRL, EOMI, anicteric sclera, no nasal discharge; uvula midline, no oropharyngeal erythema or exudates; MMM  Neck: supple; no JVD   Respiratory: CTA B/L; no W/R/R  Cardiac: +S1/S2; RRR; no M/R/G  Gastrointestinal: soft NT, ND; no rebound or guarding; +BSx4, suprapubic cath bag for urine   Extremities: WWP, no clubbing or cyanosis; no peripheral edema  Musculoskeletal: 0/5 strength testing of lower extremities, patient reports cannot wiggle toes; 3/5 UE flexion; 1+/5 UE extension  Vascular: 2+ radial, DP pulses B/L  Dermatologic: skin warm and dry  Neurologic: AAOx3    MEDICATIONS:  MEDICATIONS  (STANDING):  amitriptyline 25 milliGRAM(s) Oral at bedtime  ascorbic acid 500 milliGRAM(s) Oral daily  baclofen 20 milliGRAM(s) Oral every 8 hours  cefpodoxime 100 milliGRAM(s) Oral every 12 hours  dronabinol 5 milliGRAM(s) Oral every 12 hours  enoxaparin Injectable 40 milliGRAM(s) SubCutaneous every 24 hours  gabapentin 300 milliGRAM(s) Oral every 8 hours  melatonin 5 milliGRAM(s) Oral at bedtime  multivitamin 1 Tablet(s) Oral daily  nystatin Powder 1 Application(s) Topical two times a day  polyethylene glycol 3350 17 Gram(s) Oral daily  senna 2 Tablet(s) Oral at bedtime  zinc sulfate 220 milliGRAM(s) Oral daily    MEDICATIONS  (PRN):  acetaminophen     Tablet .. 650 milliGRAM(s) Oral every 6 hours PRN Temp greater or equal to 38C (100.4F), Mild Pain (1 - 3)  bisacodyl Suppository 10 milliGRAM(s) Rectal at bedtime PRN Constipation  cyclobenzaprine 5 milliGRAM(s) Oral three times a day PRN Muscle Spasm      ALLERGIES:  Allergies    No Known Allergies    Intolerances        LABS:                        10.4   4.36  )-----------( 240      ( 13 Feb 2022 07:37 )             32.9     02-13    141  |  107  |  16  ----------------------------<  85  3.9   |  21<L>  |  0.29<L>    Ca    9.3      13 Feb 2022 07:37  Phos  4.4     02-13  Mg     1.8     02-13            RADIOLOGY & ADDITIONAL TESTS: Reviewed.

## 2022-02-14 ENCOUNTER — TRANSCRIPTION ENCOUNTER (OUTPATIENT)
Age: 52
End: 2022-02-14

## 2022-02-14 VITALS
HEART RATE: 62 BPM | OXYGEN SATURATION: 97 % | DIASTOLIC BLOOD PRESSURE: 65 MMHG | SYSTOLIC BLOOD PRESSURE: 108 MMHG | RESPIRATION RATE: 18 BRPM | TEMPERATURE: 97 F

## 2022-02-14 PROCEDURE — 83735 ASSAY OF MAGNESIUM: CPT

## 2022-02-14 PROCEDURE — 84100 ASSAY OF PHOSPHORUS: CPT

## 2022-02-14 PROCEDURE — 80048 BASIC METABOLIC PNL TOTAL CA: CPT

## 2022-02-14 PROCEDURE — 99232 SBSQ HOSP IP/OBS MODERATE 35: CPT

## 2022-02-14 PROCEDURE — 36415 COLL VENOUS BLD VENIPUNCTURE: CPT

## 2022-02-14 PROCEDURE — 99285 EMERGENCY DEPT VISIT HI MDM: CPT

## 2022-02-14 PROCEDURE — 73590 X-RAY EXAM OF LOWER LEG: CPT

## 2022-02-14 PROCEDURE — 97161 PT EVAL LOW COMPLEX 20 MIN: CPT

## 2022-02-14 PROCEDURE — 81001 URINALYSIS AUTO W/SCOPE: CPT

## 2022-02-14 PROCEDURE — 85025 COMPLETE CBC W/AUTO DIFF WBC: CPT

## 2022-02-14 PROCEDURE — 87635 SARS-COV-2 COVID-19 AMP PRB: CPT

## 2022-02-14 RX ORDER — CEFPODOXIME PROXETIL 100 MG
1 TABLET ORAL
Qty: 0 | Refills: 0 | DISCHARGE
Start: 2022-02-14 | End: 2022-02-15

## 2022-02-14 RX ORDER — GABAPENTIN 400 MG/1
1 CAPSULE ORAL
Qty: 0 | Refills: 0 | DISCHARGE
Start: 2022-02-14

## 2022-02-14 RX ORDER — GABAPENTIN 400 MG/1
300 CAPSULE ORAL EVERY 24 HOURS
Refills: 0 | Status: DISCONTINUED | OUTPATIENT
Start: 2022-02-15 | End: 2022-02-14

## 2022-02-14 RX ORDER — NYSTATIN CREAM 100000 [USP'U]/G
1 CREAM TOPICAL
Qty: 0 | Refills: 0 | DISCHARGE
Start: 2022-02-14

## 2022-02-14 RX ORDER — IBUPROFEN 200 MG
400 TABLET ORAL ONCE
Refills: 0 | Status: COMPLETED | OUTPATIENT
Start: 2022-02-14 | End: 2022-02-14

## 2022-02-14 RX ORDER — GABAPENTIN 400 MG/1
300 CAPSULE ORAL EVERY 24 HOURS
Refills: 0 | Status: DISCONTINUED | OUTPATIENT
Start: 2022-02-14 | End: 2022-02-14

## 2022-02-14 RX ORDER — GABAPENTIN 400 MG/1
600 CAPSULE ORAL EVERY 24 HOURS
Refills: 0 | Status: DISCONTINUED | OUTPATIENT
Start: 2022-02-14 | End: 2022-02-14

## 2022-02-14 RX ORDER — GABAPENTIN 400 MG/1
1 CAPSULE ORAL
Qty: 0 | Refills: 0 | DISCHARGE

## 2022-02-14 RX ADMIN — NYSTATIN CREAM 1 APPLICATION(S): 100000 CREAM TOPICAL at 07:13

## 2022-02-14 RX ADMIN — ENOXAPARIN SODIUM 40 MILLIGRAM(S): 100 INJECTION SUBCUTANEOUS at 09:33

## 2022-02-14 RX ADMIN — Medication 10 MILLIGRAM(S): at 00:05

## 2022-02-14 RX ADMIN — Medication 25 GRAM(S): at 01:03

## 2022-02-14 RX ADMIN — GABAPENTIN 300 MILLIGRAM(S): 400 CAPSULE ORAL at 13:13

## 2022-02-14 RX ADMIN — Medication 500 MILLIGRAM(S): at 11:49

## 2022-02-14 RX ADMIN — ZINC SULFATE TAB 220 MG (50 MG ZINC EQUIVALENT) 220 MILLIGRAM(S): 220 (50 ZN) TAB at 11:49

## 2022-02-14 RX ADMIN — Medication 20 MILLIGRAM(S): at 07:12

## 2022-02-14 RX ADMIN — Medication 100 MILLIGRAM(S): at 07:12

## 2022-02-14 RX ADMIN — Medication 1 TABLET(S): at 11:48

## 2022-02-14 RX ADMIN — Medication 10 MILLIGRAM(S): at 11:48

## 2022-02-14 RX ADMIN — Medication 10 MILLIGRAM(S): at 12:48

## 2022-02-14 RX ADMIN — Medication 20 MILLIGRAM(S): at 13:13

## 2022-02-14 RX ADMIN — Medication 5 MILLIGRAM(S): at 07:12

## 2022-02-14 RX ADMIN — Medication 400 MILLIGRAM(S): at 01:03

## 2022-02-14 RX ADMIN — Medication 400 MILLIGRAM(S): at 02:08

## 2022-02-14 NOTE — DISCHARGE NOTE NURSING/CASE MANAGEMENT/SOCIAL WORK - PATIENT PORTAL LINK FT
You can access the FollowMyHealth Patient Portal offered by [facility] by registering at the following website: [email or web address]. By joining [employer]’s FollowMyHealth portal, you will also be able to view your health information using other applications (apps) compatible with our system.

## 2022-02-14 NOTE — PROGRESS NOTE ADULT - SUBJECTIVE AND OBJECTIVE BOX
INTERVAL HPI/OVERNIGHT EVENTS:    SUBJECTIVE: Patient seen and examined at bedside.     OBJECTIVE:    VITAL SIGNS:  ICU Vital Signs Last 24 Hrs  T(C): 36.3 (14 Feb 2022 05:59), Max: 36.6 (13 Feb 2022 14:27)  T(F): 97.3 (14 Feb 2022 05:59), Max: 97.8 (13 Feb 2022 14:27)  HR: 54 (14 Feb 2022 05:59) (54 - 68)  BP: 127/71 (14 Feb 2022 05:59) (94/65 - 127/71)  BP(mean): --  ABP: --  ABP(mean): --  RR: 18 (14 Feb 2022 05:59) (18 - 18)  SpO2: 98% (14 Feb 2022 05:59) (97% - 99%)        02-13 @ 07:01  -  02-14 @ 07:00  --------------------------------------------------------  IN: 0 mL / OUT: 1050 mL / NET: -1050 mL      CAPILLARY BLOOD GLUCOSE          PHYSICAL EXAM:    Constitutional: NAD, well-groomed, well-developed  HEENT: PERRLA, EOMI, Normal Hearing, MMM  Neck: No LAD, No JVD  Back: Normal spine flexure, No CVA tenderness  Respiratory: CTAB   Cardiovascular: S1 and S2, RRR, no M/G/R  Gastrointestinal: BS+, soft, NT/ND  Extremities: No peripheral edema  Vascular: 2+ peripheral pulses  Neurological: A/O x 3, no focal deficits  Psychiatric: Normal mood, normal affect  Musculoskeletal: 5/5 strength b/l upper and lower extremities  Skin: No rashes    MEDICATIONS:  MEDICATIONS  (STANDING):  amitriptyline 25 milliGRAM(s) Oral at bedtime  ascorbic acid 500 milliGRAM(s) Oral daily  baclofen 20 milliGRAM(s) Oral every 8 hours  cefpodoxime 100 milliGRAM(s) Oral every 12 hours  dronabinol 5 milliGRAM(s) Oral every 12 hours  enoxaparin Injectable 40 milliGRAM(s) SubCutaneous every 24 hours  gabapentin 300 milliGRAM(s) Oral daily  gabapentin 300 milliGRAM(s) Oral daily  gabapentin 600 milliGRAM(s) Oral daily  melatonin 5 milliGRAM(s) Oral at bedtime  multivitamin 1 Tablet(s) Oral daily  nystatin Powder 1 Application(s) Topical two times a day  polyethylene glycol 3350 17 Gram(s) Oral daily  senna 2 Tablet(s) Oral at bedtime  zinc sulfate 220 milliGRAM(s) Oral daily    MEDICATIONS  (PRN):  acetaminophen     Tablet .. 650 milliGRAM(s) Oral every 6 hours PRN Temp greater or equal to 38C (100.4F), Mild Pain (1 - 3)  bisacodyl Suppository 10 milliGRAM(s) Rectal at bedtime PRN Constipation  cyclobenzaprine 5 milliGRAM(s) Oral three times a day PRN Muscle Spasm  ketorolac 10 milliGRAM(s) Oral every 12 hours PRN Severe Pain (7 - 10)      ALLERGIES:  Allergies    No Known Allergies    Intolerances        LABS:                        10.4   4.36  )-----------( 240      ( 13 Feb 2022 07:37 )             32.9     02-13    141  |  107  |  16  ----------------------------<  85  3.9   |  21<L>  |  0.29<L>    Ca    9.3      13 Feb 2022 07:37  Phos  4.4     02-13  Mg     1.8     02-13            RADIOLOGY & ADDITIONAL TESTS: Reviewed. INTERVAL HPI/OVERNIGHT EVENTS:  Ibuprofen 400mg for pain.    SUBJECTIVE: Patient seen and examined at bedside. Patient report improvement of pins and needle sensation with new gabapentin 600 mg hS and ibuprofen. No other complaints.     OBJECTIVE:    VITAL SIGNS:  ICU Vital Signs Last 24 Hrs  T(C): 36.3 (14 Feb 2022 05:59), Max: 36.6 (13 Feb 2022 14:27)  T(F): 97.3 (14 Feb 2022 05:59), Max: 97.8 (13 Feb 2022 14:27)  HR: 54 (14 Feb 2022 05:59) (54 - 68)  BP: 127/71 (14 Feb 2022 05:59) (94/65 - 127/71)  BP(mean): --  ABP: --  ABP(mean): --  RR: 18 (14 Feb 2022 05:59) (18 - 18)  SpO2: 98% (14 Feb 2022 05:59) (97% - 99%)        02-13 @ 07:01  -  02-14 @ 07:00  --------------------------------------------------------  IN: 0 mL / OUT: 1050 mL / NET: -1050 mL      CAPILLARY BLOOD GLUCOSE        PHYSICAL EXAM:    GENERAL: NAD, cooperative   HEENT: NC/AT, PERRL, EOMI, anicteric sclera, no nasal discharge; uvula midline, no oropharyngeal erythema or exudates; MMM  Neck: supple; no JVD   Respiratory: CTA B/L; no W/R/R  Cardiac: +S1/S2; RRR; no M/R/G  Gastrointestinal: soft NT, ND; no rebound or guarding; +BSx4, suprapubic cath bag for urine   Extremities: WWP, no clubbing or cyanosis; no peripheral edema  Musculoskeletal: 0/5 strength testing of lower extremities, patient reports cannot wiggle toes; 3/5 UE flexion; 1+/5 UE extension  Vascular: 2+ radial, DP pulses B/L  Dermatologic: skin warm and dry  Neurologic: AAOx3    MEDICATIONS:  MEDICATIONS  (STANDING):  amitriptyline 25 milliGRAM(s) Oral at bedtime  ascorbic acid 500 milliGRAM(s) Oral daily  baclofen 20 milliGRAM(s) Oral every 8 hours  cefpodoxime 100 milliGRAM(s) Oral every 12 hours  dronabinol 5 milliGRAM(s) Oral every 12 hours  enoxaparin Injectable 40 milliGRAM(s) SubCutaneous every 24 hours  gabapentin 300 milliGRAM(s) Oral daily  gabapentin 300 milliGRAM(s) Oral daily  gabapentin 600 milliGRAM(s) Oral daily  melatonin 5 milliGRAM(s) Oral at bedtime  multivitamin 1 Tablet(s) Oral daily  nystatin Powder 1 Application(s) Topical two times a day  polyethylene glycol 3350 17 Gram(s) Oral daily  senna 2 Tablet(s) Oral at bedtime  zinc sulfate 220 milliGRAM(s) Oral daily    MEDICATIONS  (PRN):  acetaminophen     Tablet .. 650 milliGRAM(s) Oral every 6 hours PRN Temp greater or equal to 38C (100.4F), Mild Pain (1 - 3)  bisacodyl Suppository 10 milliGRAM(s) Rectal at bedtime PRN Constipation  cyclobenzaprine 5 milliGRAM(s) Oral three times a day PRN Muscle Spasm  ketorolac 10 milliGRAM(s) Oral every 12 hours PRN Severe Pain (7 - 10)      ALLERGIES:  Allergies    No Known Allergies    Intolerances        LABS:                        10.4   4.36  )-----------( 240      ( 13 Feb 2022 07:37 )             32.9     02-13    141  |  107  |  16  ----------------------------<  85  3.9   |  21<L>  |  0.29<L>    Ca    9.3      13 Feb 2022 07:37  Phos  4.4     02-13  Mg     1.8     02-13            RADIOLOGY & ADDITIONAL TESTS: Reviewed.

## 2022-02-14 NOTE — PROGRESS NOTE ADULT - REASON FOR ADMISSION
No motorized wheelchairs allowed at [facility]

## 2022-02-14 NOTE — PROGRESS NOTE ADULT - PROBLEM SELECTOR PLAN 5
c hx of elevated alk phos and normal ggt. likely 2/2 to bedbound status and bone turnover.
- c/w home Gabapentin 300mg PO TID   - c/w home amitriptyline 25mg PO daily once QTc confirmed within normal limits  - c/w home Baclofen 20mg PO TID  - c/w dronabinol 5mg PO BID.
c hx of elevated alk phos and normal ggt. likely 2/2 to bedbound status and bone turnover.

## 2022-02-14 NOTE — PROGRESS NOTE ADULT - PROBLEM SELECTOR PLAN 3
Last BM yesterday.  - c/w miralax BID  - c/w senna 2tabs qhs  - PRN fleet enemas  - Dulcolax suppository as needed.

## 2022-02-14 NOTE — PROGRESS NOTE ADULT - PROBLEM SELECTOR PLAN 7
- Wound care consult   - c/w nystatin powder as needed  - c/w frequent repositioning and dressing changes per nursing.
F: tolerating PO, no IVF  E: replete K<4, Mg<2  N: regular  VTE Prophylaxis: Lovenox 40 Q24H  D: [facility] --> pending rehab placement
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
## 2022-02-14 NOTE — PROGRESS NOTE ADULT - PROBLEM SELECTOR PLAN 4
RESOLVED:   After working with PT today. Ankle with no TTP, no edema.  - Xray ordered by ED provider: negative for fractures or dislocation  - tylenol 650 q6hrs PRN   - additional pain control as below
RESOLVED:   After working with PT today. Ankle with no TTP, no edema.  - Xray ordered by ED provider: negative for fractures or dislocation  - tylenol 650 q6hrs PRN   - additional pain control as below
c hx of elevated alk phos and normal ggt. likely 2/2 to bedbound status and bone turnover.
after working with PT today. Ankle with no TTP, no edema.  - Xray ordered by ED provider  - tylenol 650 q6hrs PRN   - additional pain control as below
after working with PT today. Ankle with no TTP, no edema.  - Xray ordered by ED provider  - tylenol 650 q6hrs PRN   - additional pain control as below

## 2022-02-14 NOTE — PROGRESS NOTE ADULT - NUTRITIONAL ASSESSMENT
This patient has been assessed with a concern for Malnutrition and has been determined to have a diagnosis/diagnoses of Severe protein-calorie malnutrition and Underweight (BMI < 19).    This patient is being managed with:   Diet Regular-  Entered: Feb 10 2022  4:51AM    
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
## 2022-02-14 NOTE — DISCHARGE NOTE NURSING/CASE MANAGEMENT/SOCIAL WORK - NSDCFUADDAPPT_GEN_ALL_CORE_FT
Please bring your Insurance card, Photo ID and Discharge paperwork to the following appointment:    (1) Please follow up with your Primary Care Provider, Nurse Practitioner Kaley Mcelroy at [address] [place] [address], [address] [place] [address] on 03/28/2022 at 3:20pm.    Please note that the office will contact you for an earlier appointment once available.    Appointment was scheduled by Ms. JANEEN Farley, Referral Coordinator.    Regular Diet - No restrictions  No restrictions

## 2022-02-14 NOTE — PROGRESS NOTE ADULT - PROBLEM SELECTOR PLAN 6
- c/w home Gabapentin 300mg PO TID   - c/w home amitriptyline 25mg PO daily once QTc confirmed within normal limits  - c/w home Baclofen 20mg PO TID  - c/w dronabinol 5mg PO BID.
- c/w home Gabapentin 300mg PO TID   - c/w home amitriptyline 25mg PO daily once QTc confirmed within normal limits  - c/w home Baclofen 20mg PO TID  - c/w dronabinol 5mg PO BID.
- c/w home amitriptyline 25mg PO daily once QTc confirmed within normal limits  - c/w home Baclofen 20mg PO TID  - c/w dronabinol 5mg PO BID  - At home on home Gabapentin 300mg PO TID       - Will continue Gabapentin 300mg at 5am and 1pm,  with Gabapentin 600 HS  - ibuprofen PRN
- c/w home amitriptyline 25mg PO daily once QTc confirmed within normal limits  - c/w home Baclofen 20mg PO TID  - c/w dronabinol 5mg PO BID  - At home on home Gabapentin 300mg PO TID       - Will continue Gabapentin 300 am and pm with Gabapentin 600 HS  - will start toradol PRN
- Wound care consult   - c/w nystatin powder as needed  - c/w frequent repositioning and dressing changes per nursing.

## 2022-02-14 NOTE — DISCHARGE NOTE NURSING/CASE MANAGEMENT/SOCIAL WORK - NSDCPEFALRISK_GEN_ALL_CORE
For information on Fall & Injury Prevention, visit: [email or web address] OR  [email or web address] OR  https://www.cdc.gov/steadi/patient.html

## 2022-02-14 NOTE — PROGRESS NOTE ADULT - PROBLEM SELECTOR PLAN 2
Patient reported dysuria on 02/10/22.   - UA 2/2/22 positive for Nitrates, 5-10 WBC and many bacteria  - UCx 2/2/222: + klebsiella and E coli   - UA 2/10/22: positive nitrates, WBC 5-10, pH 8.0 with moderate triple phosphate crystals   - s/p Ceftriaxone 1 gram IV 1/10/22  - c/w Cefpodoxime 100 mg BID for 4 days
Patient reported dysuria on 02/10/22.   - UA 2/2/22 positive for Nitrates, 5-10 WBC and many bacteria  - UCx 2/2/222: + klebsiella and E coli   - UA 2/10/22: positive nitrates, WBC 5-10, pH 8.0 with moderate triple phosphate crystals   - s/p Ceftriaxone 1 gram IV 1/10/22  - started Cefpodoxime 100 mg BID for 4 days
Last BM yesterday.  - c/w miralax BID  - c/w senna 2tabs qhs  - PRN fleet enemas  - Duloclax suppository as needed.
Patient reported dysuria on 02/10/22.   - UA 2/2/22 positive for Nitrates, 5-10 WBC and many bacteria  - UCx 2/2/222: + klebsiella and E coli   - UA 2/10/22: positive nitrates, WBC 5-10, pH 8.0 with moderate triple phosphate crystals   - s/p Ceftriaxone 1 gram IV 1/10/22  - c/w Cefpodoxime 100 mg BID for 4 days
Patient reported dysuria on 02/10/22.   - UA 2/2/22 positive for Nitrates, 5-10 WBC and many bacteria  - UCx 2/2/222: + klebsiella and E coli   - UA 2/10/22: positive nitrates, WBC 5-10, pH 8.0 with moderate triple phosphate crystals   - s/p Ceftriaxone 1 gram IV 1/10/22  - started Cefpodoxime 100 mg BID for 4 days

## 2022-02-16 DIAGNOSIS — G82.50 QUADRIPLEGIA, UNSPECIFIED: ICD-10-CM

## 2022-02-16 DIAGNOSIS — Z93.6 OTHER ARTIFICIAL OPENINGS OF URINARY TRACT STATUS: ICD-10-CM

## 2022-02-16 DIAGNOSIS — R53.1 WEAKNESS: ICD-10-CM

## 2022-02-16 DIAGNOSIS — Z90.6 ACQUIRED ABSENCE OF OTHER PARTS OF URINARY TRACT: ICD-10-CM

## 2022-02-16 DIAGNOSIS — E43 UNSPECIFIED SEVERE PROTEIN-CALORIE MALNUTRITION: ICD-10-CM

## 2022-02-16 DIAGNOSIS — N30.00 ACUTE CYSTITIS WITHOUT HEMATURIA: ICD-10-CM

## 2022-02-16 DIAGNOSIS — F12.99 CANNABIS USE, UNSPECIFIED WITH UNSPECIFIED CANNABIS-INDUCED DISORDER: ICD-10-CM

## 2022-02-16 DIAGNOSIS — K59.00 CONSTIPATION, UNSPECIFIED: ICD-10-CM

## 2022-02-16 DIAGNOSIS — G62.9 POLYNEUROPATHY, UNSPECIFIED: ICD-10-CM

## 2022-02-16 DIAGNOSIS — Z75.1 PERSON AWAITING ADMISSION TO ADEQUATE FACILITY ELSEWHERE: ICD-10-CM

## 2022-02-16 DIAGNOSIS — E83.39 OTHER DISORDERS OF PHOSPHORUS METABOLISM: ICD-10-CM

## 2022-02-16 DIAGNOSIS — L89.612 PRESSURE ULCER OF RIGHT HEEL, STAGE 2: ICD-10-CM
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
## 2022-10-13 NOTE — OCCUPATIONAL THERAPY INITIAL EVALUATION ADULT - BED MOBILITY/TRANSFERS, PREVIOUS LEVEL OF FUNCTION, OT EVAL
Medications that were prescribed for you today are:  -Narcan: Use in the case of known or suspected overdose. Spray the content of 1 device into 1 nostril. Call 911. May repeat with 2nd device in alternate nostril if no response in 2-3 minutes.  -Oxycodone- Acetaminophen (Percocet) 5-325mg (1-2 tablets daily as needed for pain, maximum of 20 tablets per 30 days)  -PLEASE CONTACT THE OFFICE PRIOR TO STOPPING ANY MEDICATIONS  - Please allow 72 hours for all refill requests.  We will not refill any pain medicine after business hours or on weekends. Thank you!    -Stop at the lab TODAY for a urine drug screen. If you do not stop today, Dr Liang will no longer be able to prescribe any controlled substances for you.   FYI: Every insurance has different coverage for the urine drug screen testing. You may have out of pocket costs associated with the test.     -Today you signed an opioid contract with our team.    -Today Narcan was prescribed. Use in the case of known or suspected overdose. Spray the content of 1 device into 1 nostril. Call 911. May repeat with 2nd device in alternate nostril if no response in 2-3 minutes.  `  IMAGING:  You have been recommended to undergo an XRAY of your Thoracic and Lumbar to help us better understand and treat your symptoms.   We will call you with your results! You do not need an appointment for an X-ray, you just go to the imaging department at any [place] location.      It is recommended you schedule a follow-up appointment with Nilson Liang MD, in 3 month(s).    Office hours are 8:00 am to 5:00 pm Monday through Friday. If it is urgent that you speak with someone outside of these hours, our [facility] will be able to assist you. You can reach the office by calling the [facility] [place] [facility] at [phone] or [facility] at [phone].     We do highly recommend [facility], if you do not already have this. You can request access  via the internet or by simply talking with a  at any of the clinics. Vani    Thank you for choosing [facility] as your Pain Management provider!    If you have any questions or concerns prior to your next office visit, please call our Pain Line: [phone].  They will take a message and send it to Dr Cook's staff.  You will receive a response within 48 hours.     needs device and assist

## 2022-10-31 ENCOUNTER — EMERGENCY (EMERGENCY)
Facility: HOSPITAL | Age: 52
LOS: 1 days | Discharge: HOME | End: 2022-10-31
Attending: STUDENT IN AN ORGANIZED HEALTH CARE EDUCATION/TRAINING PROGRAM | Admitting: STUDENT IN AN ORGANIZED HEALTH CARE EDUCATION/TRAINING PROGRAM
Payer: MEDICARE

## 2022-10-31 VITALS
HEART RATE: 73 BPM | WEIGHT: 119.93 LBS | SYSTOLIC BLOOD PRESSURE: 107 MMHG | OXYGEN SATURATION: 99 % | RESPIRATION RATE: 22 BRPM | HEIGHT: 67 IN | DIASTOLIC BLOOD PRESSURE: 74 MMHG | TEMPERATURE: 98 F

## 2022-10-31 VITALS
DIASTOLIC BLOOD PRESSURE: 55 MMHG | OXYGEN SATURATION: 95 % | SYSTOLIC BLOOD PRESSURE: 95 MMHG | HEART RATE: 75 BPM | RESPIRATION RATE: 16 BRPM

## 2022-10-31 DIAGNOSIS — Z98.890 OTHER SPECIFIED POSTPROCEDURAL STATES: Chronic | ICD-10-CM

## 2022-10-31 LAB
ANION GAP SERPL CALC-SCNC: 13 MMOL/L — SIGNIFICANT CHANGE UP (ref 5–17)
BASE EXCESS BLDV CALC-SCNC: -0.2 MMOL/L — SIGNIFICANT CHANGE UP (ref -2–3)
BASOPHILS # BLD AUTO: 0.02 K/UL — SIGNIFICANT CHANGE UP (ref 0–0.2)
BASOPHILS NFR BLD AUTO: 0.9 % — SIGNIFICANT CHANGE UP (ref 0–2)
BUN SERPL-MCNC: 9 MG/DL — SIGNIFICANT CHANGE UP (ref 7–23)
CALCIUM SERPL-MCNC: 9.1 MG/DL — SIGNIFICANT CHANGE UP (ref 8.4–10.5)
CHLORIDE SERPL-SCNC: 100 MMOL/L — SIGNIFICANT CHANGE UP (ref 96–108)
CO2 BLDV-SCNC: 25.1 MMOL/L — SIGNIFICANT CHANGE UP (ref 22–26)
CO2 SERPL-SCNC: 22 MMOL/L — SIGNIFICANT CHANGE UP (ref 22–31)
CREAT SERPL-MCNC: 0.23 MG/DL — LOW (ref 0.5–1.3)
EGFR: 137 ML/MIN/1.73M2 — SIGNIFICANT CHANGE UP
EOSINOPHIL # BLD AUTO: 0 K/UL — SIGNIFICANT CHANGE UP (ref 0–0.5)
EOSINOPHIL NFR BLD AUTO: 0 % — SIGNIFICANT CHANGE UP (ref 0–6)
FLUAV AG NPH QL: SIGNIFICANT CHANGE UP
FLUBV AG NPH QL: SIGNIFICANT CHANGE UP
GIANT PLATELETS BLD QL SMEAR: PRESENT — SIGNIFICANT CHANGE UP
GLUCOSE SERPL-MCNC: 113 MG/DL — HIGH (ref 70–99)
HCO3 BLDV-SCNC: 24 MMOL/L — SIGNIFICANT CHANGE UP (ref 22–29)
HCT VFR BLD CALC: 39.2 % — SIGNIFICANT CHANGE UP (ref 34.5–45)
HGB BLD-MCNC: 12.9 G/DL — SIGNIFICANT CHANGE UP (ref 11.5–15.5)
LYMPHOCYTES # BLD AUTO: 0.34 K/UL — LOW (ref 1–3.3)
LYMPHOCYTES # BLD AUTO: 12.3 % — LOW (ref 13–44)
MANUAL SMEAR VERIFICATION: SIGNIFICANT CHANGE UP
MCHC RBC-ENTMCNC: 32.3 PG — SIGNIFICANT CHANGE UP (ref 27–34)
MCHC RBC-ENTMCNC: 32.9 GM/DL — SIGNIFICANT CHANGE UP (ref 32–36)
MCV RBC AUTO: 98.2 FL — SIGNIFICANT CHANGE UP (ref 80–100)
MONOCYTES # BLD AUTO: 0.14 K/UL — SIGNIFICANT CHANGE UP (ref 0–0.9)
MONOCYTES NFR BLD AUTO: 5.2 % — SIGNIFICANT CHANGE UP (ref 2–14)
NEUTROPHILS # BLD AUTO: 2.24 K/UL — SIGNIFICANT CHANGE UP (ref 1.8–7.4)
NEUTROPHILS NFR BLD AUTO: 81.6 % — HIGH (ref 43–77)
NT-PROBNP SERPL-SCNC: 121 PG/ML — SIGNIFICANT CHANGE UP (ref 0–300)
OVALOCYTES BLD QL SMEAR: SLIGHT — SIGNIFICANT CHANGE UP
PCO2 BLDV: 37 MMHG — LOW (ref 39–42)
PH BLDV: 7.42 — SIGNIFICANT CHANGE UP (ref 7.32–7.43)
PLAT MORPH BLD: ABNORMAL
PLATELET # BLD AUTO: 189 K/UL — SIGNIFICANT CHANGE UP (ref 150–400)
PO2 BLDV: 71 MMHG — HIGH (ref 25–45)
POLYCHROMASIA BLD QL SMEAR: SLIGHT — SIGNIFICANT CHANGE UP
POTASSIUM SERPL-MCNC: 4.1 MMOL/L — SIGNIFICANT CHANGE UP (ref 3.5–5.3)
POTASSIUM SERPL-SCNC: 4.1 MMOL/L — SIGNIFICANT CHANGE UP (ref 3.5–5.3)
RBC # BLD: 3.99 M/UL — SIGNIFICANT CHANGE UP (ref 3.8–5.2)
RBC # FLD: 15.2 % — HIGH (ref 10.3–14.5)
RBC BLD AUTO: ABNORMAL
RSV RNA NPH QL NAA+NON-PROBE: SIGNIFICANT CHANGE UP
SAO2 % BLDV: 96.3 % — HIGH (ref 67–88)
SARS-COV-2 RNA SPEC QL NAA+PROBE: SIGNIFICANT CHANGE UP
SODIUM SERPL-SCNC: 135 MMOL/L — SIGNIFICANT CHANGE UP (ref 135–145)
TROPONIN T SERPL-MCNC: <0.01 NG/ML — SIGNIFICANT CHANGE UP (ref 0–0.01)
WBC # BLD: 2.74 K/UL — LOW (ref 3.8–10.5)
WBC # FLD AUTO: 2.74 K/UL — LOW (ref 3.8–10.5)

## 2022-10-31 PROCEDURE — 85025 COMPLETE CBC W/AUTO DIFF WBC: CPT

## 2022-10-31 PROCEDURE — 80048 BASIC METABOLIC PNL TOTAL CA: CPT

## 2022-10-31 PROCEDURE — 99285 EMERGENCY DEPT VISIT HI MDM: CPT | Mod: 25

## 2022-10-31 PROCEDURE — 96374 THER/PROPH/DIAG INJ IV PUSH: CPT

## 2022-10-31 PROCEDURE — 71045 X-RAY EXAM CHEST 1 VIEW: CPT

## 2022-10-31 PROCEDURE — 93005 ELECTROCARDIOGRAM TRACING: CPT

## 2022-10-31 PROCEDURE — 83880 ASSAY OF NATRIURETIC PEPTIDE: CPT

## 2022-10-31 PROCEDURE — 87637 SARSCOV2&INF A&B&RSV AMP PRB: CPT

## 2022-10-31 PROCEDURE — 99053 MED SERV 10PM-8AM 24 HR FAC: CPT

## 2022-10-31 PROCEDURE — 99285 EMERGENCY DEPT VISIT HI MDM: CPT

## 2022-10-31 PROCEDURE — 82803 BLOOD GASES ANY COMBINATION: CPT

## 2022-10-31 PROCEDURE — 36415 COLL VENOUS BLD VENIPUNCTURE: CPT

## 2022-10-31 PROCEDURE — 94640 AIRWAY INHALATION TREATMENT: CPT

## 2022-10-31 PROCEDURE — 71045 X-RAY EXAM CHEST 1 VIEW: CPT | Mod: 26

## 2022-10-31 PROCEDURE — 84484 ASSAY OF TROPONIN QUANT: CPT

## 2022-10-31 RX ORDER — BACLOFEN 100 %
5 POWDER (GRAM) MISCELLANEOUS ONCE
Refills: 0 | Status: COMPLETED | OUTPATIENT
Start: 2022-10-31 | End: 2022-10-31

## 2022-10-31 RX ORDER — GABAPENTIN 400 MG/1
300 CAPSULE ORAL ONCE
Refills: 0 | Status: COMPLETED | OUTPATIENT
Start: 2022-10-31 | End: 2022-10-31

## 2022-10-31 RX ORDER — IPRATROPIUM/ALBUTEROL SULFATE 18-103MCG
3 AEROSOL WITH ADAPTER (GRAM) INHALATION ONCE
Refills: 0 | Status: COMPLETED | OUTPATIENT
Start: 2022-10-31 | End: 2022-10-31

## 2022-10-31 RX ORDER — BACLOFEN 100 %
5 POWDER (GRAM) MISCELLANEOUS ONCE
Refills: 0 | Status: DISCONTINUED | OUTPATIENT
Start: 2022-10-31 | End: 2022-10-31

## 2022-10-31 RX ORDER — ALBUTEROL 90 UG/1
2 AEROSOL, METERED ORAL
Qty: 1 | Refills: 0
Start: 2022-10-31

## 2022-10-31 RX ORDER — METHOCARBAMOL 500 MG/1
1000 TABLET, FILM COATED ORAL ONCE
Refills: 0 | Status: COMPLETED | OUTPATIENT
Start: 2022-10-31 | End: 2022-10-31

## 2022-10-31 RX ORDER — AZITHROMYCIN 500 MG/1
1 TABLET, FILM COATED ORAL
Qty: 6 | Refills: 0
Start: 2022-10-31 | End: 2022-11-05

## 2022-10-31 RX ADMIN — GABAPENTIN 300 MILLIGRAM(S): 400 CAPSULE ORAL at 08:31

## 2022-10-31 RX ADMIN — METHOCARBAMOL 1000 MILLIGRAM(S): 500 TABLET, FILM COATED ORAL at 08:31

## 2022-10-31 RX ADMIN — Medication 3 MILLILITER(S): at 10:31

## 2022-10-31 RX ADMIN — Medication 125 MILLIGRAM(S): at 10:28

## 2022-10-31 RX ADMIN — Medication 3 MILLILITER(S): at 09:00

## 2022-10-31 NOTE — ED ADULT NURSE REASSESSMENT NOTE - NS ED NURSE REASSESS COMMENT FT1
report received from nightshift DANICA yang. PT resting in stretcher. no complaints at this time. will cont to reassess.
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
## 2022-10-31 NOTE — ED PROVIDER NOTE - NSFOLLOWUPINSTRUCTIONS_ED_ALL_ED_FT
Please reach out to Claudia Tavares ([facility] clinical referral coordinator) to assist you with your follow-up appointment with PULMONOLOGY.     Monday - Friday 11am-7pm  [phone]  [email or web address]  ___________________________    Acute Bronchitis, Adult    A comparison between normal and swollen airways, or bronchi, in the lungs.   Acute bronchitis is sudden inflammation of the main airways (bronchi) that come off the windpipe (trachea) in the lungs. The swelling causes the airways to get smaller and make more mucus than normal. This can make it hard to breathe and can cause coughing or noisy breathing (wheezing).    Acute bronchitis may last several weeks. The cough may last longer. Allergies, asthma, and exposure to smoke may make the condition worse.      What are the causes?    This condition can be caused by germs and by substances that irritate the lungs, including:  •Cold and flu viruses. The most common cause of this condition is the virus that causes the common cold.      •Bacteria. This is less common.    •Breathing in substances that irritate the lungs, including:  •Smoke from cigarettes and other forms of tobacco.      •Dust and pollen.      •Fumes from household cleaning products, gases, or burned fuel.      •Indoor or outdoor air pollution.          What increases the risk?    The following factors may make you more likely to develop this condition:  •A weak body's defense system, also called the immune system.      •A condition that affects your lungs and breathing, such as asthma.        What are the signs or symptoms?    Common symptoms of this condition include:  •Coughing. This may bring up clear, yellow, or green mucus from your lungs (sputum).      •Wheezing.      •Runny or stuffy nose.      •Having too much mucus in your lungs (chest congestion).      •Shortness of breath.      •Aches and pains, including sore throat or chest.        How is this diagnosed?    This condition is usually diagnosed based on:  •Your symptoms and medical history.      •A physical exam.      You may also have other tests, including tests to rule out other conditions, such as pneumonia. These tests include:  •A test of lung function.      •Test of a mucus sample to look for the presence of bacteria.      •Tests to check the oxygen level in your blood.      •Blood tests.      •Chest X-ray.        How is this treated?    Most cases of acute bronchitis clear up over time without treatment. Your health care provider may recommend:  •Drinking more fluids to help thin your mucus so it is easier to cough up.      •Taking inhaled medicine (inhaler) to improve air flow in and out of your lungs.      •Using a vaporizer or a humidifier. These are machines that add water to the air to help you breathe better.      •Taking a medicine that thins mucus and clears congestion (expectorant).      •Taking a medicine that prevents or stops coughing (cough suppressant).      It is notcommon to take an antibiotic medicine for this condition.      Follow these instructions at home:  A do not smoke cigarettes sign.   •Take over-the-counter and prescription medicines only as told by your health care provider.      •Use an inhaler, vaporizer, or humidifier as told by your health care provider.      •Take two teaspoons (10 mL) of honey at bedtime to lessen coughing at night.      •Drink enough fluid to keep your urine pale yellow.      • Do not use any products that contain nicotine or tobacco. These products include cigarettes, chewing tobacco, and vaping devices, such as e-cigarettes. If you need help quitting, ask your health care provider.      •Get plenty of rest.      •Return to your normal activities as told by your health care provider. Ask your health care provider what activities are safe for you.      •Keep all follow-up visits. This is important.        How is this prevented?  Washing hands with soap and water.   To lower your risk of getting this condition again:  •Wash your hands often with soap and water for at least 20 seconds. If soap and water are not available, use hand .      •Avoid contact with people who have cold symptoms.      •Try not to touch your mouth, nose, or eyes with your hands.      •Avoid breathing in smoke or chemical fumes. Breathing smoke or chemical fumes will make your condition worse.      •Get the flu shot every year.        Contact a health care provider if:    •Your symptoms do not improve after 2 weeks.      •You have trouble coughing up the mucus.      •Your cough keeps you awake at night.      •You have a fever.        Get help right away if you:    •Cough up blood.      •Feel pain in your chest.      •Have severe shortness of breath.      •Faint or keep feeling like you are going to faint.      •Have a severe headache.      •Have a fever or chills that get worse.      These symptoms may represent a serious problem that is an emergency. Do not wait to see if the symptoms will go away. Get medical help right away. Call your local emergency services (911 in the [place] Do not drive yourself to the hospital.       Summary    •Acute bronchitis is inflammation of the main airways (bronchi) that come off the windpipe (trachea) in the lungs. The swelling causes the airways to get smaller and make more mucus than normal.      •Drinking more fluids can help thin your mucus so it is easier to cough up.      •Take over-the-counter and prescription medicines only as told by your health care provider.      • Do not use any products that contain nicotine or tobacco. These products include cigarettes, chewing tobacco, and vaping devices, such as e-cigarettes. If you need help quitting, ask your health care provider.      •Contact a health care provider if your symptoms do not improve after 2 weeks.      This information is not intended to replace advice given to you by your health care provider. Make sure you discuss any questions you have with your health care provider.      Document Revised: 04/20/2022 Document Reviewed: 04/20/2022    Elsevier Patient Education © 2022 [employer]

## 2022-10-31 NOTE — ED PROVIDER NOTE - PROGRESS NOTE DETAILS
reassessed after nebs/steroids and pt with improvement in respiratory exam, clear lungs, states symptoms improved. will dc with albuterol/prednisone and pulm f/u

## 2022-10-31 NOTE — ED PROVIDER NOTE - PHYSICAL EXAMINATION
CONST: nontoxic NAD speaking in full sentences  HEAD: atraumatic  EYES: conjunctivae clear, PERRL, EOMI  ENT: mmm  NECK: supple  CARD: rrr   CHEST: b/l wheezing and rhonchi  ABD: soft, nd, nttp, no rebound/guarding  EXT: quadriplegic  SKIN: warm, dry, no rash, no pedal edema/ttp/rash, cap refill <2sec  NEURO: a+ox3, has movements of b/l UE
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
## 2022-10-31 NOTE — ED PROVIDER NOTE - CLINICAL SUMMARY MEDICAL DECISION MAKING FREE TEXT BOX
r/o pneumonia vs bronchitis vs less likely pulm edema/cardiac wheeze  plan for labs, ekg, cxr  nebs steroids reassess

## 2022-10-31 NOTE — ED PROVIDER NOTE - PATIENT PORTAL LINK FT
You can access the FollowMyHealth Patient Portal offered by [facility] by registering at the following website: [email or web address]. By joining [employer]’s FollowMyHealth portal, you will also be able to view your health information using other applications (apps) compatible with our system.

## 2022-11-02 DIAGNOSIS — R05.9 COUGH, UNSPECIFIED: ICD-10-CM

## 2022-11-02 DIAGNOSIS — Z90.6 ACQUIRED ABSENCE OF OTHER PARTS OF URINARY TRACT: ICD-10-CM

## 2022-11-02 DIAGNOSIS — Z79.01 LONG TERM (CURRENT) USE OF ANTICOAGULANTS: ICD-10-CM

## 2022-11-02 DIAGNOSIS — Z87.09 PERSONAL HISTORY OF OTHER DISEASES OF THE RESPIRATORY SYSTEM: ICD-10-CM

## 2022-11-02 DIAGNOSIS — Z87.891 PERSONAL HISTORY OF NICOTINE DEPENDENCE: ICD-10-CM

## 2022-11-02 DIAGNOSIS — J40 BRONCHITIS, NOT SPECIFIED AS ACUTE OR CHRONIC: ICD-10-CM

## 2022-11-02 DIAGNOSIS — G82.50 QUADRIPLEGIA, UNSPECIFIED: ICD-10-CM

## 2022-11-02 DIAGNOSIS — Z20.822 CONTACT WITH AND (SUSPECTED) EXPOSURE TO COVID-19: ICD-10-CM

## 2022-11-09 ENCOUNTER — APPOINTMENT (OUTPATIENT)
Dept: PULMONOLOGY | Facility: CLINIC | Age: 52
End: 2022-11-09

## 2022-11-09 PROBLEM — Z00.00 ENCOUNTER FOR PREVENTIVE HEALTH EXAMINATION: Status: ACTIVE | Noted: 2022-11-09

## 2023-01-04 NOTE — DISCHARGE NOTE NURSING/CASE MANAGEMENT/SOCIAL WORK - NSTRANSFERBELONGINGSDISPO_GEN_A_NUR
Subjective:       Patient ID: Caro Hayden with patient

## 2023-01-19 NOTE — ED PROVIDER NOTE - CADM POA PRESS ULCER
Okay to try diflucan x2. Rx sent to pharmacy. If no improvement will need office visit next week with Dr. Alberto Mcclure. Thank you. No
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
## 2023-06-09 VITALS
HEART RATE: 55 BPM | TEMPERATURE: 98 F | DIASTOLIC BLOOD PRESSURE: 50 MMHG | OXYGEN SATURATION: 98 % | RESPIRATION RATE: 18 BRPM | SYSTOLIC BLOOD PRESSURE: 77 MMHG

## 2023-06-09 LAB
ANION GAP SERPL CALC-SCNC: 7 MMOL/L — SIGNIFICANT CHANGE UP (ref 5–17)
APPEARANCE UR: CLEAR — SIGNIFICANT CHANGE UP
BACTERIA # UR AUTO: ABNORMAL /HPF
BASE EXCESS BLDV CALC-SCNC: -2 MMOL/L — SIGNIFICANT CHANGE UP (ref -2–3)
BASOPHILS # BLD AUTO: 0.04 K/UL — SIGNIFICANT CHANGE UP (ref 0–0.2)
BASOPHILS NFR BLD AUTO: 1 % — SIGNIFICANT CHANGE UP (ref 0–2)
BILIRUB DIRECT SERPL-MCNC: <0.2 MG/DL — SIGNIFICANT CHANGE UP (ref 0–0.3)
BILIRUB INDIRECT FLD-MCNC: SIGNIFICANT CHANGE UP MG/DL (ref 0.2–1)
BILIRUB SERPL-MCNC: 0.3 MG/DL — SIGNIFICANT CHANGE UP (ref 0.2–1.2)
BILIRUB UR-MCNC: NEGATIVE — SIGNIFICANT CHANGE UP
BUN SERPL-MCNC: 14 MG/DL — SIGNIFICANT CHANGE UP (ref 7–23)
CALCIUM SERPL-MCNC: 8.9 MG/DL — SIGNIFICANT CHANGE UP (ref 8.4–10.5)
CHLORIDE SERPL-SCNC: 102 MMOL/L — SIGNIFICANT CHANGE UP (ref 96–108)
CO2 BLDV-SCNC: 25 MMOL/L — SIGNIFICANT CHANGE UP (ref 22–26)
CO2 SERPL-SCNC: 25 MMOL/L — SIGNIFICANT CHANGE UP (ref 22–31)
COLOR SPEC: YELLOW — SIGNIFICANT CHANGE UP
COMMENT - URINE: SIGNIFICANT CHANGE UP
CREAT SERPL-MCNC: 0.25 MG/DL — LOW (ref 0.5–1.3)
DIFF PNL FLD: NEGATIVE — SIGNIFICANT CHANGE UP
EGFR: 133 ML/MIN/1.73M2 — SIGNIFICANT CHANGE UP
EOSINOPHIL # BLD AUTO: 0.15 K/UL — SIGNIFICANT CHANGE UP (ref 0–0.5)
EOSINOPHIL NFR BLD AUTO: 3.8 % — SIGNIFICANT CHANGE UP (ref 0–6)
EPI CELLS # UR: SIGNIFICANT CHANGE UP /HPF (ref 0–5)
GAS PNL BLDV: SIGNIFICANT CHANGE UP
GLUCOSE SERPL-MCNC: 84 MG/DL — SIGNIFICANT CHANGE UP (ref 70–99)
GLUCOSE UR QL: NEGATIVE — SIGNIFICANT CHANGE UP
HCO3 BLDV-SCNC: 24 MMOL/L — SIGNIFICANT CHANGE UP (ref 22–29)
HCT VFR BLD CALC: 35.1 % — SIGNIFICANT CHANGE UP (ref 34.5–45)
HGB BLD-MCNC: 11.8 G/DL — SIGNIFICANT CHANGE UP (ref 11.5–15.5)
IMM GRANULOCYTES NFR BLD AUTO: 0.3 % — SIGNIFICANT CHANGE UP (ref 0–0.9)
KETONES UR-MCNC: NEGATIVE — SIGNIFICANT CHANGE UP
LACTATE SERPL-SCNC: 1 MMOL/L — SIGNIFICANT CHANGE UP (ref 0.5–2)
LEUKOCYTE ESTERASE UR-ACNC: NEGATIVE — SIGNIFICANT CHANGE UP
LIDOCAIN IGE QN: 31 U/L — SIGNIFICANT CHANGE UP (ref 7–60)
LYMPHOCYTES # BLD AUTO: 1.36 K/UL — SIGNIFICANT CHANGE UP (ref 1–3.3)
LYMPHOCYTES # BLD AUTO: 34.3 % — SIGNIFICANT CHANGE UP (ref 13–44)
MCHC RBC-ENTMCNC: 33.1 PG — SIGNIFICANT CHANGE UP (ref 27–34)
MCHC RBC-ENTMCNC: 33.6 GM/DL — SIGNIFICANT CHANGE UP (ref 32–36)
MCV RBC AUTO: 98.3 FL — SIGNIFICANT CHANGE UP (ref 80–100)
MONOCYTES # BLD AUTO: 0.34 K/UL — SIGNIFICANT CHANGE UP (ref 0–0.9)
MONOCYTES NFR BLD AUTO: 8.6 % — SIGNIFICANT CHANGE UP (ref 2–14)
NEUTROPHILS # BLD AUTO: 2.06 K/UL — SIGNIFICANT CHANGE UP (ref 1.8–7.4)
NEUTROPHILS NFR BLD AUTO: 52 % — SIGNIFICANT CHANGE UP (ref 43–77)
NITRITE UR-MCNC: POSITIVE
NRBC # BLD: 0 /100 WBCS — SIGNIFICANT CHANGE UP (ref 0–0)
PCO2 BLDV: 43 MMHG — HIGH (ref 39–42)
PH BLDV: 7.35 — SIGNIFICANT CHANGE UP (ref 7.32–7.43)
PH UR: 5.5 — SIGNIFICANT CHANGE UP (ref 5–8)
PLATELET # BLD AUTO: 178 K/UL — SIGNIFICANT CHANGE UP (ref 150–400)
PO2 BLDV: 72 MMHG — HIGH (ref 25–45)
POTASSIUM SERPL-MCNC: 4.4 MMOL/L — SIGNIFICANT CHANGE UP (ref 3.5–5.3)
POTASSIUM SERPL-SCNC: 4.4 MMOL/L — SIGNIFICANT CHANGE UP (ref 3.5–5.3)
PROT UR-MCNC: NEGATIVE MG/DL — SIGNIFICANT CHANGE UP
RBC # BLD: 3.57 M/UL — LOW (ref 3.8–5.2)
RBC # FLD: 13.9 % — SIGNIFICANT CHANGE UP (ref 10.3–14.5)
RBC CASTS # UR COMP ASSIST: ABNORMAL /HPF
SAO2 % BLDV: 95 % — SIGNIFICANT CHANGE UP (ref 67–88)
SARS-COV-2 RNA SPEC QL NAA+PROBE: SIGNIFICANT CHANGE UP
SODIUM SERPL-SCNC: 134 MMOL/L — LOW (ref 135–145)
SP GR SPEC: 1.01 — SIGNIFICANT CHANGE UP (ref 1–1.03)
TROPONIN T, HIGH SENSITIVITY RESULT: 12 NG/L — SIGNIFICANT CHANGE UP (ref 0–51)
UROBILINOGEN FLD QL: 0.2 E.U./DL — SIGNIFICANT CHANGE UP
WBC # BLD: 3.96 K/UL — SIGNIFICANT CHANGE UP (ref 3.8–10.5)
WBC # FLD AUTO: 3.96 K/UL — SIGNIFICANT CHANGE UP (ref 3.8–10.5)
WBC UR QL: ABNORMAL /HPF

## 2023-06-09 PROCEDURE — 74177 CT ABD & PELVIS W/CONTRAST: CPT | Mod: 26,MA

## 2023-06-09 PROCEDURE — 71275 CT ANGIOGRAPHY CHEST: CPT | Mod: 26,MA

## 2023-06-09 PROCEDURE — 99285 EMERGENCY DEPT VISIT HI MDM: CPT

## 2023-06-09 PROCEDURE — 71045 X-RAY EXAM CHEST 1 VIEW: CPT | Mod: 26

## 2023-06-09 RX ORDER — VANCOMYCIN HCL 1 G
1000 VIAL (EA) INTRAVENOUS ONCE
Refills: 0 | Status: COMPLETED | OUTPATIENT
Start: 2023-06-09 | End: 2023-06-09

## 2023-06-09 RX ORDER — IOHEXOL 300 MG/ML
30 INJECTION, SOLUTION INTRAVENOUS ONCE
Refills: 0 | Status: COMPLETED | OUTPATIENT
Start: 2023-06-09 | End: 2023-06-09

## 2023-06-09 RX ORDER — CEFTRIAXONE 500 MG/1
1000 INJECTION, POWDER, FOR SOLUTION INTRAMUSCULAR; INTRAVENOUS ONCE
Refills: 0 | Status: COMPLETED | OUTPATIENT
Start: 2023-06-09 | End: 2023-06-09

## 2023-06-09 RX ORDER — SODIUM CHLORIDE 9 MG/ML
1000 INJECTION INTRAMUSCULAR; INTRAVENOUS; SUBCUTANEOUS ONCE
Refills: 0 | Status: COMPLETED | OUTPATIENT
Start: 2023-06-09 | End: 2023-06-09

## 2023-06-09 RX ORDER — PIPERACILLIN AND TAZOBACTAM 4; .5 G/20ML; G/20ML
3.38 INJECTION, POWDER, LYOPHILIZED, FOR SOLUTION INTRAVENOUS ONCE
Refills: 0 | Status: COMPLETED | OUTPATIENT
Start: 2023-06-09 | End: 2023-06-09

## 2023-06-09 RX ORDER — IPRATROPIUM/ALBUTEROL SULFATE 18-103MCG
3 AEROSOL WITH ADAPTER (GRAM) INHALATION ONCE
Refills: 0 | Status: COMPLETED | OUTPATIENT
Start: 2023-06-09 | End: 2023-06-09

## 2023-06-09 RX ADMIN — CEFTRIAXONE 100 MILLIGRAM(S): 500 INJECTION, POWDER, FOR SOLUTION INTRAMUSCULAR; INTRAVENOUS at 21:10

## 2023-06-09 RX ADMIN — IOHEXOL 30 MILLILITER(S): 300 INJECTION, SOLUTION INTRAVENOUS at 19:44

## 2023-06-09 RX ADMIN — SODIUM CHLORIDE 1000 MILLILITER(S): 9 INJECTION INTRAMUSCULAR; INTRAVENOUS; SUBCUTANEOUS at 19:21

## 2023-06-09 RX ADMIN — Medication 3 MILLILITER(S): at 23:20

## 2023-06-09 NOTE — ED ADULT TRIAGE NOTE - HEART RATE (BEATS/MIN)
PATIENT INFORMATION SHEET: PRETEEN      For:  Eleanor LO61  [address]  [facility] [address]  Visit Vitals  BP 95/64   Pulse 83   Ht 4' 4\" (1.321 m)   Wt 29.4 kg (64 lb 12.8 oz)   BMI 16.85 kg/m²     Weight: 64.8 lbs    NUTRITION: Children in this age range will be under increasing peer pressure to eat junk food and to emulate celebrity teens pushing soda and other junk foods. Common problems are excess weight gain, low calcium intake, and high fat intake. The majority of bone calcium is formed at this age range and if the diet is low in calcium, early osteoporosis may result. Teens concerned about weight may want to consider low-fat dairy products or calcium fortified juices. Non-milk dairy products most often do not contain vitamin D but soy milk for those allergic to milk or preferring non-animal foods does. Continue to emphasize fruits and vegetables as snacks. Try to set a good example yourself. A multivitamin with 200 units vitamin D should be given to all children consuming less that 16 ounces of milk per day.      SAFETY: Accidents are the most common cause of death in this age range. Automobile deaths, falls, pedestrian, bike and fire and drowning are at the top of the list. Be firm. No seat belt, no go, no fun. The seat belt should be across the hips and not across the stomach. The child should ride in the back seat. The center seat is the safest if it has a shoulder harness. Be willing to call off the activity or they will call your bluff. It is important to teach about gun safety even if there are no guns in the home. At home make sure guns are locked, unloaded and with ammunition stored separately. Teach bike riding limits. Insist on helmet and protective gear for anything with wheels (skateboards/roller blades/roller skates/scooters, etc.). Wrist fractures are very common and wrist braces are a good investment. Children are fascinated by power equipment but are not able to use it  55 safely in this age range. They may do all right when things are going well but panic easily. Backpacks and book bags have been an increasing source of injury. They should be limited to 15% of the child's body weight and carried over both shoulders.         DEVELOPMENT:  By the age of 8 or 9 a child:  1. has an attention span of one hour.  2. is learning to tell time  3. is capable of chores without constant verbal reminders (although a written list is helpful).  4. is very concerned about rules and fairness. He/she will be very angry and upset if another child is excused from chores, [employer] or other obligations because of other activities even if the parents perceive those activities as important (e.g., sports, music, clubs). He/she will refuse healthy foods if parents don't eat them.  5. can read for enjoyment  6. has plans for the future, although they are not often realistic and tend to be occupations that either attract attention such as sports stars or performers. They often imagine themselves having the same job as their parent or other adults that they know well.   Watch with interest the improvement in your child's reasoning ability.  6-7 year olds will think that a tall thin container holds more than a slightly shorter wide one. They will think that two quarters are more money than a dollar and may not know which numbers are bigger despite being able to count. 8-11 year olds can tell that both height and width are important. They can follow from 3 to five commands in a row (e.g., 1. go to your room and 2. get your socks and 3. shoes and 4. jacket and 5. back pack). Twelve year olds can tell you how to figure out which of two containers holds more liquid.    All children in this age range will choose fun activities before chores (homework or even family activities). They have an unrealistic understanding of how long it will take to complete a task. \"I can finish vacuuming in five minutes.\" They may expect to  finish a large homework assignment beginning at 9 pm on  night. Insist on work before pleasure but breaks are ok. Try to teach them to break down chores and homework into smaller pieces consistent with their attention span. DON'T DO THEIR CHORES FOR THEM in the mistaken belief that they will be grateful. Instead they will regard you as their servant and respect you less. Do not allow distractions. TV and loud music rarely help homework. Set a minimum time at the dinner table.     BEHAVIOR PROBLEMS: The most common causes are problems with friends or school mates.  If there has been a major change in your child's behavior, look for the followin. rejection by a friend  2. death or separation in the family  3. arguments especially if one parent has threatened to leave.  4. fears: commonly weather, kidnapping or robbery. These fears are often set off by real events, scary movies or television. In general children are much more upset by what happens to children on TV than by what happens to adults.  5. bullies.  6. violent TV, movies or video games. Boys in particular tend to play \"chicken\" with their friends to see who can watch the scariest movie.  Lying, cheating, or stealing: Children just begin to tell the truth under pressure by the age of 8. Correct them privately but make them replace anything that was stolen or gained by cheating.   Watch yourself and your mouth. If you park in handicapped zones or complain about speed limits or complain loudly about unfair traffic rules, be aware that little ears are soaking this up.  Listen carefully to conversations among your children and their friends while you are driving. They often forget that you are there. It is amazing what you can learn. Try not to interrupt. You can correct them later. As one famous man said, \"I never learned a thing while I was talking.\"  MEDICINE:    Acetaminophen dose:  44-55 pounds: 240 mg,   55-66 pounds: 320 mg (two tsp or one adult  regular strength tablet or 4 chewables),  66-99 pounds: 3 tsp. 99 pounds  up 4 tsp or two adult regular strength    Ibuprofen dose:  44-88 pounds: 200mg (one adult over-the-counter tablet).  88 pounds and up: 400 mg     Cold and cough medicines:    Over 12: adult dose.    6-12: 1/2 the adult dose.  NEXT VISIT: 2 YEARS.

## 2023-06-09 NOTE — ED ADULT NURSE NOTE - NSFALLHARMRISKINTERV_ED_ALL_ED

## 2023-06-09 NOTE — ED PROVIDER NOTE - CARE PLAN
1 Principal Discharge DX:	Chest pain  Secondary Diagnosis:	Hypotension   Principal Discharge DX:	Pneumonia, aspiration  Secondary Diagnosis:	Hypotension

## 2023-06-09 NOTE — ED ADULT NURSE NOTE - OBJECTIVE STATEMENT
Pt received aaox3 c/o sharp CP since 9am this morning. Pt also c/o rash on the upper right neck, c/o itching took benadryl PTA. Pt has pmhx of quadriplegia. Pt noted to be hypotensive 70's/50's in triage. As per patient BP is typically 90/60. Pt denies any lightheadedness, HA, dizziness, fever, chills, N/V/D, cough or SOB.

## 2023-06-09 NOTE — ED PROVIDER NOTE - PHYSICAL EXAMINATION
General: comfortable, resting in ED  HEENT: atraumatic, no eye erythema or discharge  Pulm: no cyanosis, no added work of breathing  Cardiac: extremities warm, intact peripheral pulse  GI: no abdominal distension, abdomen nontender  Neuro: alert, conversant  Psych: neutral affect, cooperative  Msk: contractures of hands bilaterally  Skin: scattered papules and patches around neck and lower face
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
## 2023-06-09 NOTE — ED ADULT TRIAGE NOTE - CHIEF COMPLAINT QUOTE
BIBEMS from [facility] co midsternal CP radiating down L arm since 9am this morning. Reports pain has been gradually improving since receiving 324mg ASA via EMS. Pt hypotensive upon arrival, EMS reports steady downtrend in BP throuhg out trnasport.

## 2023-06-09 NOTE — ED ADULT TRIAGE NOTE - NS ED TRIAGE CLINICAL UPGRADE
See [employer] message below.     Routing to provider to review and advise.    Humaira Calles RN  [facility]     Deteriorating patient status - Patient was clinically upgraded due to deteriorating patient status.

## 2023-06-09 NOTE — ED PROVIDER NOTE - CLINICAL SUMMARY MEDICAL DECISION MAKING FREE TEXT BOX
#chest pain: plan to r/o emergent causes of chest pain, including PNA, pneumothorax, PE (given immobility and hypotension), anemia with resulting myocardial ischemia, and rib fracture.    # hypotension: possible dehydration combined with autonomic instability 2/2 quadriplegia, will provide IV hydration and trend blood pressure.  R/o occult infection ?PNA as above ?UTI.  Will screen for hepatobiliary process ?cholecystitis ?pancreatitis ?hepatitis on labs, and will add CTAP r/o other acute intraabdominal process ?sbo ?perforation ?appendicitis ?diverticulitis ?volvulus ?intussusception.  No history of heart failure, unlikely acute systolic dysfunction.  Possible PE, r/o as above.

## 2023-06-09 NOTE — ED PROVIDER NOTE - PROGRESS NOTE DETAILS
Labs with UTI, will give ceftriaxone in ED.  Pending CT chest and CTAP r/o other acute pathology including PE or intraabdominal infection.    Will sign out to Dr. Maher: 53F with quadriplegia, now with chest pain and hypotension, hypotension improved with fluids, found with UTI will likely need admission given earlier hemodynamic instability, pending CTA chest ?PE and CTAP ?intraabdominal infectious process.

## 2023-06-09 NOTE — ED ADULT NURSE NOTE - CHIEF COMPLAINT QUOTE
BIBEMS from [facility] co midsternal CP radiating down L arm since 9am this morning. Reports pain has been gradually improving since receiving 324mg ASA via EMS. Pt hypotensive upon arrival, EMS reports steady downtrend in BP throuhg out trnasport.

## 2023-06-10 ENCOUNTER — TRANSCRIPTION ENCOUNTER (OUTPATIENT)
Age: 53
End: 2023-06-10

## 2023-06-10 ENCOUNTER — INPATIENT (INPATIENT)
Facility: HOSPITAL | Age: 53
LOS: 0 days | Discharge: HOME | DRG: 73 | End: 2023-06-10
Attending: GENERAL ACUTE CARE HOSPITAL | Admitting: GENERAL ACUTE CARE HOSPITAL
Payer: MEDICARE

## 2023-06-10 VITALS
TEMPERATURE: 98 F | RESPIRATION RATE: 18 BRPM | HEART RATE: 70 BPM | DIASTOLIC BLOOD PRESSURE: 76 MMHG | SYSTOLIC BLOOD PRESSURE: 110 MMHG | OXYGEN SATURATION: 95 %

## 2023-06-10 DIAGNOSIS — L89.90 PRESSURE ULCER OF UNSPECIFIED SITE, UNSPECIFIED STAGE: ICD-10-CM

## 2023-06-10 DIAGNOSIS — R07.9 CHEST PAIN, UNSPECIFIED: ICD-10-CM

## 2023-06-10 DIAGNOSIS — Z01.818 ENCOUNTER FOR OTHER PREPROCEDURAL EXAMINATION: ICD-10-CM

## 2023-06-10 DIAGNOSIS — Z86.69 PERSONAL HISTORY OF OTHER DISEASES OF THE NERVOUS SYSTEM AND SENSE ORGANS: ICD-10-CM

## 2023-06-10 DIAGNOSIS — K59.00 CONSTIPATION, UNSPECIFIED: ICD-10-CM

## 2023-06-10 DIAGNOSIS — G62.9 POLYNEUROPATHY, UNSPECIFIED: ICD-10-CM

## 2023-06-10 DIAGNOSIS — S72.001A FRACTURE OF UNSPECIFIED PART OF NECK OF RIGHT FEMUR, INITIAL ENCOUNTER FOR CLOSED FRACTURE: ICD-10-CM

## 2023-06-10 DIAGNOSIS — Z98.890 OTHER SPECIFIED POSTPROCEDURAL STATES: Chronic | ICD-10-CM

## 2023-06-10 DIAGNOSIS — R63.8 OTHER SYMPTOMS AND SIGNS CONCERNING FOOD AND FLUID INTAKE: ICD-10-CM

## 2023-06-10 DIAGNOSIS — J69.0 PNEUMONITIS DUE TO INHALATION OF FOOD AND VOMIT: ICD-10-CM

## 2023-06-10 DIAGNOSIS — R21 RASH AND OTHER NONSPECIFIC SKIN ERUPTION: ICD-10-CM

## 2023-06-10 LAB
24R-OH-CALCIDIOL SERPL-MCNC: 66.3 NG/ML — SIGNIFICANT CHANGE UP (ref 30–80)
AMPHET UR-MCNC: NEGATIVE — SIGNIFICANT CHANGE UP
ANION GAP SERPL CALC-SCNC: 10 MMOL/L — SIGNIFICANT CHANGE UP (ref 5–17)
APTT BLD: 32.2 SEC — SIGNIFICANT CHANGE UP (ref 27.5–35.5)
APTT BLD: 33.5 SEC — SIGNIFICANT CHANGE UP (ref 27.5–35.5)
BARBITURATES UR SCN-MCNC: NEGATIVE — SIGNIFICANT CHANGE UP
BASOPHILS # BLD AUTO: 0.02 K/UL — SIGNIFICANT CHANGE UP (ref 0–0.2)
BASOPHILS NFR BLD AUTO: 0.5 % — SIGNIFICANT CHANGE UP (ref 0–2)
BENZODIAZ UR-MCNC: NEGATIVE — SIGNIFICANT CHANGE UP
BLD GP AB SCN SERPL QL: NEGATIVE — SIGNIFICANT CHANGE UP
BLD GP AB SCN SERPL QL: NEGATIVE — SIGNIFICANT CHANGE UP
BUN SERPL-MCNC: 12 MG/DL — SIGNIFICANT CHANGE UP (ref 7–23)
CALCIUM SERPL-MCNC: 8.6 MG/DL — SIGNIFICANT CHANGE UP (ref 8.4–10.5)
CHLORIDE SERPL-SCNC: 104 MMOL/L — SIGNIFICANT CHANGE UP (ref 96–108)
CK MB CFR SERPL CALC: 1.7 NG/ML — SIGNIFICANT CHANGE UP (ref 0–6.7)
CK SERPL-CCNC: 66 U/L — SIGNIFICANT CHANGE UP (ref 25–170)
CO2 SERPL-SCNC: 21 MMOL/L — LOW (ref 22–31)
COCAINE METAB.OTHER UR-MCNC: NEGATIVE — SIGNIFICANT CHANGE UP
CREAT SERPL-MCNC: 0.33 MG/DL — LOW (ref 0.5–1.3)
EGFR: 125 ML/MIN/1.73M2 — SIGNIFICANT CHANGE UP
EOSINOPHIL # BLD AUTO: 0.04 K/UL — SIGNIFICANT CHANGE UP (ref 0–0.5)
EOSINOPHIL NFR BLD AUTO: 1 % — SIGNIFICANT CHANGE UP (ref 0–6)
GLUCOSE BLDC GLUCOMTR-MCNC: 108 MG/DL — HIGH (ref 70–99)
GLUCOSE SERPL-MCNC: 108 MG/DL — HIGH (ref 70–99)
HCG SERPL-ACNC: 3 MIU/ML — SIGNIFICANT CHANGE UP
HCG SERPL-ACNC: 3 MIU/ML — SIGNIFICANT CHANGE UP
HCT VFR BLD CALC: 31.5 % — LOW (ref 34.5–45)
HGB BLD-MCNC: 10.5 G/DL — LOW (ref 11.5–15.5)
IMM GRANULOCYTES NFR BLD AUTO: 0.2 % — SIGNIFICANT CHANGE UP (ref 0–0.9)
INR BLD: 0.98 — SIGNIFICANT CHANGE UP (ref 0.88–1.16)
INR BLD: 1 — SIGNIFICANT CHANGE UP (ref 0.88–1.16)
LYMPHOCYTES # BLD AUTO: 0.86 K/UL — LOW (ref 1–3.3)
LYMPHOCYTES # BLD AUTO: 20.7 % — SIGNIFICANT CHANGE UP (ref 13–44)
MAGNESIUM SERPL-MCNC: 1.8 MG/DL — SIGNIFICANT CHANGE UP (ref 1.6–2.6)
MCHC RBC-ENTMCNC: 32.6 PG — SIGNIFICANT CHANGE UP (ref 27–34)
MCHC RBC-ENTMCNC: 33.3 GM/DL — SIGNIFICANT CHANGE UP (ref 32–36)
MCV RBC AUTO: 97.8 FL — SIGNIFICANT CHANGE UP (ref 80–100)
METHADONE UR-MCNC: NEGATIVE — SIGNIFICANT CHANGE UP
MONOCYTES # BLD AUTO: 0.28 K/UL — SIGNIFICANT CHANGE UP (ref 0–0.9)
MONOCYTES NFR BLD AUTO: 6.7 % — SIGNIFICANT CHANGE UP (ref 2–14)
NEUTROPHILS # BLD AUTO: 2.95 K/UL — SIGNIFICANT CHANGE UP (ref 1.8–7.4)
NEUTROPHILS NFR BLD AUTO: 70.9 % — SIGNIFICANT CHANGE UP (ref 43–77)
NRBC # BLD: 0 /100 WBCS — SIGNIFICANT CHANGE UP (ref 0–0)
OPIATES UR-MCNC: POSITIVE
PCP SPEC-MCNC: SIGNIFICANT CHANGE UP
PCP UR-MCNC: NEGATIVE — SIGNIFICANT CHANGE UP
PHOSPHATE SERPL-MCNC: 4 MG/DL — SIGNIFICANT CHANGE UP (ref 2.5–4.5)
PLATELET # BLD AUTO: 163 K/UL — SIGNIFICANT CHANGE UP (ref 150–400)
POTASSIUM SERPL-MCNC: 4.1 MMOL/L — SIGNIFICANT CHANGE UP (ref 3.5–5.3)
POTASSIUM SERPL-SCNC: 4.1 MMOL/L — SIGNIFICANT CHANGE UP (ref 3.5–5.3)
PROCALCITONIN SERPL-MCNC: <0.02 NG/ML — SIGNIFICANT CHANGE UP (ref 0.02–0.1)
PROTHROM AB SERPL-ACNC: 11.7 SEC — SIGNIFICANT CHANGE UP (ref 10.5–13.4)
PROTHROM AB SERPL-ACNC: 11.9 SEC — SIGNIFICANT CHANGE UP (ref 10.5–13.4)
PTH-INTACT FLD-MCNC: 19 PG/ML — SIGNIFICANT CHANGE UP (ref 15–65)
RAPID RVP RESULT: SIGNIFICANT CHANGE UP
RBC # BLD: 3.22 M/UL — LOW (ref 3.8–5.2)
RBC # FLD: 14 % — SIGNIFICANT CHANGE UP (ref 10.3–14.5)
RH IG SCN BLD-IMP: POSITIVE — SIGNIFICANT CHANGE UP
RH IG SCN BLD-IMP: POSITIVE — SIGNIFICANT CHANGE UP
SARS-COV-2 RNA SPEC QL NAA+PROBE: SIGNIFICANT CHANGE UP
SODIUM SERPL-SCNC: 135 MMOL/L — SIGNIFICANT CHANGE UP (ref 135–145)
T4 AB SER-ACNC: 6.96 UG/DL — SIGNIFICANT CHANGE UP (ref 4.5–11.7)
THC UR QL: POSITIVE
TROPONIN T, HIGH SENSITIVITY RESULT: 13 NG/L — SIGNIFICANT CHANGE UP (ref 0–51)
TSH SERPL-MCNC: 3.79 UIU/ML — SIGNIFICANT CHANGE UP (ref 0.27–4.2)
WBC # BLD: 4.16 K/UL — SIGNIFICANT CHANGE UP (ref 3.8–10.5)
WBC # FLD AUTO: 4.16 K/UL — SIGNIFICANT CHANGE UP (ref 3.8–10.5)

## 2023-06-10 PROCEDURE — 87040 BLOOD CULTURE FOR BACTERIA: CPT

## 2023-06-10 PROCEDURE — 81001 URINALYSIS AUTO W/SCOPE: CPT

## 2023-06-10 PROCEDURE — 86850 RBC ANTIBODY SCREEN: CPT

## 2023-06-10 PROCEDURE — 71275 CT ANGIOGRAPHY CHEST: CPT | Mod: MA

## 2023-06-10 PROCEDURE — 85610 PROTHROMBIN TIME: CPT

## 2023-06-10 PROCEDURE — 82803 BLOOD GASES ANY COMBINATION: CPT

## 2023-06-10 PROCEDURE — 83605 ASSAY OF LACTIC ACID: CPT

## 2023-06-10 PROCEDURE — 82248 BILIRUBIN DIRECT: CPT

## 2023-06-10 PROCEDURE — 0225U NFCT DS DNA&RNA 21 SARSCOV2: CPT

## 2023-06-10 PROCEDURE — 82553 CREATINE MB FRACTION: CPT

## 2023-06-10 PROCEDURE — 83970 ASSAY OF PARATHORMONE: CPT

## 2023-06-10 PROCEDURE — 96375 TX/PRO/DX INJ NEW DRUG ADDON: CPT

## 2023-06-10 PROCEDURE — 82247 BILIRUBIN TOTAL: CPT

## 2023-06-10 PROCEDURE — 82310 ASSAY OF CALCIUM: CPT

## 2023-06-10 PROCEDURE — 84702 CHORIONIC GONADOTROPIN TEST: CPT

## 2023-06-10 PROCEDURE — 80307 DRUG TEST PRSMV CHEM ANLYZR: CPT

## 2023-06-10 PROCEDURE — 84443 ASSAY THYROID STIM HORMONE: CPT

## 2023-06-10 PROCEDURE — 84484 ASSAY OF TROPONIN QUANT: CPT

## 2023-06-10 PROCEDURE — 73502 X-RAY EXAM HIP UNI 2-3 VIEWS: CPT | Mod: 26,RT

## 2023-06-10 PROCEDURE — 99223 1ST HOSP IP/OBS HIGH 75: CPT | Mod: GC

## 2023-06-10 PROCEDURE — 86901 BLOOD TYPING SEROLOGIC RH(D): CPT

## 2023-06-10 PROCEDURE — 94640 AIRWAY INHALATION TREATMENT: CPT

## 2023-06-10 PROCEDURE — 84100 ASSAY OF PHOSPHORUS: CPT

## 2023-06-10 PROCEDURE — 96374 THER/PROPH/DIAG INJ IV PUSH: CPT

## 2023-06-10 PROCEDURE — 71045 X-RAY EXAM CHEST 1 VIEW: CPT

## 2023-06-10 PROCEDURE — 92610 EVALUATE SWALLOWING FUNCTION: CPT

## 2023-06-10 PROCEDURE — 80048 BASIC METABOLIC PNL TOTAL CA: CPT

## 2023-06-10 PROCEDURE — 73551 X-RAY EXAM OF FEMUR 1: CPT

## 2023-06-10 PROCEDURE — 84145 PROCALCITONIN (PCT): CPT

## 2023-06-10 PROCEDURE — 82962 GLUCOSE BLOOD TEST: CPT

## 2023-06-10 PROCEDURE — 85025 COMPLETE CBC W/AUTO DIFF WBC: CPT

## 2023-06-10 PROCEDURE — 83690 ASSAY OF LIPASE: CPT

## 2023-06-10 PROCEDURE — 86900 BLOOD TYPING SEROLOGIC ABO: CPT

## 2023-06-10 PROCEDURE — 84436 ASSAY OF TOTAL THYROXINE: CPT

## 2023-06-10 PROCEDURE — 74177 CT ABD & PELVIS W/CONTRAST: CPT | Mod: MA

## 2023-06-10 PROCEDURE — 99285 EMERGENCY DEPT VISIT HI MDM: CPT

## 2023-06-10 PROCEDURE — 73551 X-RAY EXAM OF FEMUR 1: CPT | Mod: 26,RT

## 2023-06-10 PROCEDURE — 82306 VITAMIN D 25 HYDROXY: CPT

## 2023-06-10 PROCEDURE — 36415 COLL VENOUS BLD VENIPUNCTURE: CPT

## 2023-06-10 PROCEDURE — 73502 X-RAY EXAM HIP UNI 2-3 VIEWS: CPT

## 2023-06-10 PROCEDURE — 83735 ASSAY OF MAGNESIUM: CPT

## 2023-06-10 PROCEDURE — 82550 ASSAY OF CK (CPK): CPT

## 2023-06-10 PROCEDURE — 85730 THROMBOPLASTIN TIME PARTIAL: CPT

## 2023-06-10 PROCEDURE — 87635 SARS-COV-2 COVID-19 AMP PRB: CPT

## 2023-06-10 RX ORDER — GABAPENTIN 400 MG/1
1 CAPSULE ORAL
Qty: 30 | Refills: 0
Start: 2023-06-10 | End: 2023-07-09

## 2023-06-10 RX ORDER — ACETAMINOPHEN 500 MG
1000 TABLET ORAL ONCE
Refills: 0 | Status: COMPLETED | OUTPATIENT
Start: 2023-06-10 | End: 2023-06-10

## 2023-06-10 RX ORDER — BACLOFEN 100 %
5 POWDER (GRAM) MISCELLANEOUS EVERY 8 HOURS
Refills: 0 | Status: DISCONTINUED | OUTPATIENT
Start: 2023-06-10 | End: 2023-06-10

## 2023-06-10 RX ORDER — SENNA PLUS 8.6 MG/1
2 TABLET ORAL ONCE
Refills: 0 | Status: COMPLETED | OUTPATIENT
Start: 2023-06-10 | End: 2023-06-10

## 2023-06-10 RX ORDER — GABAPENTIN 400 MG/1
400 CAPSULE ORAL EVERY 24 HOURS
Refills: 0 | Status: DISCONTINUED | OUTPATIENT
Start: 2023-06-10 | End: 2023-06-10

## 2023-06-10 RX ORDER — SODIUM CHLORIDE 9 MG/ML
4 INJECTION INTRAMUSCULAR; INTRAVENOUS; SUBCUTANEOUS EVERY 12 HOURS
Refills: 0 | Status: DISCONTINUED | OUTPATIENT
Start: 2023-06-10 | End: 2023-06-10

## 2023-06-10 RX ORDER — AMITRIPTYLINE HCL 25 MG
25 TABLET ORAL AT BEDTIME
Refills: 0 | Status: DISCONTINUED | OUTPATIENT
Start: 2023-06-10 | End: 2023-06-10

## 2023-06-10 RX ORDER — GABAPENTIN 400 MG/1
600 CAPSULE ORAL AT BEDTIME
Refills: 0 | Status: DISCONTINUED | OUTPATIENT
Start: 2023-06-10 | End: 2023-06-10

## 2023-06-10 RX ORDER — IPRATROPIUM/ALBUTEROL SULFATE 18-103MCG
3 AEROSOL WITH ADAPTER (GRAM) INHALATION ONCE
Refills: 0 | Status: COMPLETED | OUTPATIENT
Start: 2023-06-10 | End: 2023-06-10

## 2023-06-10 RX ORDER — ACETAMINOPHEN 500 MG
650 TABLET ORAL EVERY 6 HOURS
Refills: 0 | Status: DISCONTINUED | OUTPATIENT
Start: 2023-06-10 | End: 2023-06-10

## 2023-06-10 RX ORDER — ACETAMINOPHEN 500 MG
2 TABLET ORAL
Qty: 240 | Refills: 0
Start: 2023-06-10 | End: 2023-07-09

## 2023-06-10 RX ORDER — SENNA PLUS 8.6 MG/1
2 TABLET ORAL AT BEDTIME
Refills: 0 | Status: DISCONTINUED | OUTPATIENT
Start: 2023-06-10 | End: 2023-06-10

## 2023-06-10 RX ORDER — NYSTATIN CREAM 100000 [USP'U]/G
1 CREAM TOPICAL
Refills: 0 | Status: DISCONTINUED | OUTPATIENT
Start: 2023-06-10 | End: 2023-06-10

## 2023-06-10 RX ORDER — IPRATROPIUM/ALBUTEROL SULFATE 18-103MCG
3 AEROSOL WITH ADAPTER (GRAM) INHALATION EVERY 6 HOURS
Refills: 0 | Status: DISCONTINUED | OUTPATIENT
Start: 2023-06-10 | End: 2023-06-10

## 2023-06-10 RX ORDER — GABAPENTIN 400 MG/1
800 CAPSULE ORAL AT BEDTIME
Refills: 0 | Status: DISCONTINUED | OUTPATIENT
Start: 2023-06-10 | End: 2023-06-10

## 2023-06-10 RX ORDER — KETOROLAC TROMETHAMINE 30 MG/ML
15 SYRINGE (ML) INJECTION EVERY 6 HOURS
Refills: 0 | Status: DISCONTINUED | OUTPATIENT
Start: 2023-06-10 | End: 2023-06-10

## 2023-06-10 RX ORDER — MORPHINE SULFATE 50 MG/1
2 CAPSULE, EXTENDED RELEASE ORAL EVERY 6 HOURS
Refills: 0 | Status: DISCONTINUED | OUTPATIENT
Start: 2023-06-10 | End: 2023-06-10

## 2023-06-10 RX ORDER — GABAPENTIN 400 MG/1
1 CAPSULE ORAL
Qty: 60 | Refills: 0
Start: 2023-06-10 | End: 2023-07-09

## 2023-06-10 RX ORDER — DRONABINOL 2.5 MG
5 CAPSULE ORAL EVERY 12 HOURS
Refills: 0 | Status: DISCONTINUED | OUTPATIENT
Start: 2023-06-10 | End: 2023-06-10

## 2023-06-10 RX ORDER — ACETYLCYSTEINE 200 MG/ML
4 VIAL (ML) MISCELLANEOUS ONCE
Refills: 0 | Status: COMPLETED | OUTPATIENT
Start: 2023-06-10 | End: 2023-06-10

## 2023-06-10 RX ORDER — CYCLOBENZAPRINE HYDROCHLORIDE 10 MG/1
5 TABLET, FILM COATED ORAL THREE TIMES A DAY
Refills: 0 | Status: DISCONTINUED | OUTPATIENT
Start: 2023-06-10 | End: 2023-06-10

## 2023-06-10 RX ORDER — GABAPENTIN 400 MG/1
300 CAPSULE ORAL EVERY 24 HOURS
Refills: 0 | Status: DISCONTINUED | OUTPATIENT
Start: 2023-06-10 | End: 2023-06-10

## 2023-06-10 RX ORDER — GABAPENTIN 400 MG/1
300 CAPSULE ORAL DAILY
Refills: 0 | Status: DISCONTINUED | OUTPATIENT
Start: 2023-06-10 | End: 2023-06-10

## 2023-06-10 RX ORDER — MORPHINE SULFATE 50 MG/1
1 CAPSULE, EXTENDED RELEASE ORAL ONCE
Refills: 0 | Status: DISCONTINUED | OUTPATIENT
Start: 2023-06-10 | End: 2023-06-10

## 2023-06-10 RX ORDER — AMOXICILLIN 250 MG/5ML
500 SUSPENSION, RECONSTITUTED, ORAL (ML) ORAL ONCE
Refills: 0 | Status: COMPLETED | OUTPATIENT
Start: 2023-06-10 | End: 2023-06-10

## 2023-06-10 RX ORDER — GABAPENTIN 400 MG/1
600 CAPSULE ORAL ONCE
Refills: 0 | Status: COMPLETED | OUTPATIENT
Start: 2023-06-10 | End: 2023-06-10

## 2023-06-10 RX ORDER — GABAPENTIN 400 MG/1
700 CAPSULE ORAL AT BEDTIME
Refills: 0 | Status: DISCONTINUED | OUTPATIENT
Start: 2023-06-10 | End: 2023-06-10

## 2023-06-10 RX ORDER — BACLOFEN 100 %
15 POWDER (GRAM) MISCELLANEOUS ONCE
Refills: 0 | Status: COMPLETED | OUTPATIENT
Start: 2023-06-10 | End: 2023-06-10

## 2023-06-10 RX ADMIN — CYCLOBENZAPRINE HYDROCHLORIDE 5 MILLIGRAM(S): 10 TABLET, FILM COATED ORAL at 20:00

## 2023-06-10 RX ADMIN — MORPHINE SULFATE 2 MILLIGRAM(S): 50 CAPSULE, EXTENDED RELEASE ORAL at 06:23

## 2023-06-10 RX ADMIN — Medication 250 MILLIGRAM(S): at 00:46

## 2023-06-10 RX ADMIN — SODIUM CHLORIDE 4 MILLILITER(S): 9 INJECTION INTRAMUSCULAR; INTRAVENOUS; SUBCUTANEOUS at 20:11

## 2023-06-10 RX ADMIN — MORPHINE SULFATE 2 MILLIGRAM(S): 50 CAPSULE, EXTENDED RELEASE ORAL at 06:08

## 2023-06-10 RX ADMIN — Medication 15 MILLIGRAM(S): at 04:54

## 2023-06-10 RX ADMIN — Medication 1 APPLICATION(S): at 17:02

## 2023-06-10 RX ADMIN — Medication 500 MILLIGRAM(S): at 15:22

## 2023-06-10 RX ADMIN — Medication 5 MILLIGRAM(S): at 06:08

## 2023-06-10 RX ADMIN — SODIUM CHLORIDE 4 MILLILITER(S): 9 INJECTION INTRAMUSCULAR; INTRAVENOUS; SUBCUTANEOUS at 07:38

## 2023-06-10 RX ADMIN — MORPHINE SULFATE 2 MILLIGRAM(S): 50 CAPSULE, EXTENDED RELEASE ORAL at 14:27

## 2023-06-10 RX ADMIN — Medication 1 APPLICATION(S): at 09:06

## 2023-06-10 RX ADMIN — Medication 5 MILLIGRAM(S): at 17:06

## 2023-06-10 RX ADMIN — MORPHINE SULFATE 1 MILLIGRAM(S): 50 CAPSULE, EXTENDED RELEASE ORAL at 02:11

## 2023-06-10 RX ADMIN — NYSTATIN CREAM 1 APPLICATION(S): 100000 CREAM TOPICAL at 06:08

## 2023-06-10 RX ADMIN — Medication 25 MILLIGRAM(S): at 06:09

## 2023-06-10 RX ADMIN — Medication 4 MILLILITER(S): at 04:54

## 2023-06-10 RX ADMIN — Medication 15 MILLIGRAM(S): at 14:20

## 2023-06-10 RX ADMIN — Medication 3 MILLILITER(S): at 04:46

## 2023-06-10 RX ADMIN — SENNA PLUS 2 TABLET(S): 8.6 TABLET ORAL at 01:55

## 2023-06-10 RX ADMIN — Medication 15 MILLIGRAM(S): at 17:24

## 2023-06-10 RX ADMIN — Medication 3 MILLILITER(S): at 04:16

## 2023-06-10 RX ADMIN — Medication 5 MILLIGRAM(S): at 13:30

## 2023-06-10 RX ADMIN — PIPERACILLIN AND TAZOBACTAM 200 GRAM(S): 4; .5 INJECTION, POWDER, LYOPHILIZED, FOR SOLUTION INTRAVENOUS at 00:01

## 2023-06-10 RX ADMIN — GABAPENTIN 400 MILLIGRAM(S): 400 CAPSULE ORAL at 12:45

## 2023-06-10 RX ADMIN — Medication 5 MILLIGRAM(S): at 06:09

## 2023-06-10 RX ADMIN — GABAPENTIN 400 MILLIGRAM(S): 400 CAPSULE ORAL at 06:09

## 2023-06-10 RX ADMIN — GABAPENTIN 600 MILLIGRAM(S): 400 CAPSULE ORAL at 02:11

## 2023-06-10 RX ADMIN — MORPHINE SULFATE 2 MILLIGRAM(S): 50 CAPSULE, EXTENDED RELEASE ORAL at 14:45

## 2023-06-10 RX ADMIN — Medication 400 MILLIGRAM(S): at 03:25

## 2023-06-10 RX ADMIN — SENNA PLUS 2 TABLET(S): 8.6 TABLET ORAL at 11:57

## 2023-06-10 RX ADMIN — Medication 15 MILLIGRAM(S): at 17:40
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
## 2023-06-10 NOTE — PATIENT PROFILE ADULT - STATED REASON FOR ADMISSION
Chest pain radiating to jaw, and left arm.  Started yesterday from 4 pm and lasted 2 hours.  Pain resolved now.

## 2023-06-10 NOTE — PATIENT PROFILE ADULT - FALL HARM RISK - ATTEMPT OOB
From: Sherrie Olszewski  To: Kaylee Encarnacion MD  Sent: 2/28/2021 10:10 PM EST  Subject: Visit Follow-Up Question    Rah Champion,    I'm wondering if it is possible to have some tests that would rule out any heart problems/artery blockage. I'm now paranoid about any back pain or chest pain that I have which I previously attributed to gallbladder problems especially after you recommended taking Lipitor. I need some peace of mind that I'm not on the verge of a heart attack : -(    Jacob Bell No

## 2023-06-10 NOTE — CHART NOTE - NSCHARTNOTEFT_GEN_A_CORE
RDN Brief Note    RDN consulted for PI. Spoke with pt at bedside who notes she has no active pressure injury. RN flowsheet notes PI to sacrum healed. Endorses no change in appetite, PO intake, or wt. Recommend continue a regular diet as tolerated. RDN will continue to reassess, intervene, and monitor as appropriate.     Jr Jurado MS, RDN, CDN (ex. [id number])

## 2023-06-10 NOTE — DISCHARGE NOTE PROVIDER - NSDCMRMEDTOKEN_GEN_ALL_CORE_FT
Albuterol (Eqv-ProAir HFA) 90 mcg/inh inhalation aerosol: 2 puff(s) inhaled every 4 hours   AMITRIPTYLINE 25MG TABLETS: 1 tab(s) orally once a day (at bedtime)  ascorbic acid 500 mg oral tablet: 1 tab(s) orally once a day  BACLOFEN 20MG TAB: 1 tab(s) orally every 8 hours  BISACODYL 10MG SUPPOSITORIES: 1 each rectal once a day (at bedtime), As Needed for constipation  cyclobenzaprine 5 mg oral tablet: 1 tab(s) orally 3 times a day, As needed, Muscle Spasm  dronabinol 5 mg oral capsule: 1 cap(s) orally every 12 hours  gabapentin 400 mg oral capsule: 1 cap(s) orally 2 times a day Take one capsule at 5 AM and one at 1PM  gabapentin 800 mg oral tablet: 1 tab(s) orally once a day (at bedtime)  melatonin 5 mg oral tablet: 1 tab(s) orally once a day (at bedtime)  Multiple Vitamins oral tablet: 1 tab(s) orally once a day  nystatin 100,000 units/g topical powder: 1 application topically 2 times a day  polyethylene glycol 3350 oral powder for reconstitution: 17 gram(s) orally once a day  senna oral tablet: 2 tab(s) orally every 24 hours  triamcinolone 0.1% topical cream: 1 Apply topically to affected area every 12 hours  Tylenol 325 mg oral capsule: 2 cap(s) orally every 6 hours as needed for Pain  zinc sulfate 220 mg oral capsule: 1 cap(s) orally once a day
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
## 2023-06-10 NOTE — H&P ADULT - PROBLEM SELECTOR PLAN 5
Patient with constipation noted on CT. Rectal exam does not reveal feces for manual disimpaction.   -Continue Senna 2 Tables   -Continue Bisacodyl  -Enema

## 2023-06-10 NOTE — SWALLOW BEDSIDE ASSESSMENT ADULT - ORAL PHASE
Adequate oral containment, functional mastication with solids and bolus manipulation, with functional oral clearance noted.
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
## 2023-06-10 NOTE — DISCHARGE NOTE NURSING/CASE MANAGEMENT/SOCIAL WORK - PATIENT PORTAL LINK FT
You can access the FollowMyHealth Patient Portal offered by [facility] by registering at the following website: [email or web address]. By joining [facility]’s FollowMyHealth portal, you will also be able to view your health information using other applications (apps) compatible with our system.

## 2023-06-10 NOTE — SWALLOW BEDSIDE ASSESSMENT ADULT - COMMENTS
Pt is not previously known to our service. No dysphagia complaints. Pt consumed a regular breakfast meal this morning without difficulty. RN endorsed the same.  CT Angio chest on 6/9/23:    IMPRESSION:1.  No pulmonary embolism.   2.  Secretions/debris scattered within the bilateral bronchi, as described above, greatest within the left lower lobe bronchus with associated left lower lobe subsegmental atelectasis. Correlate for aspiration versus small airways disease. 3.  New chronic appearing complete fragmentation and bone loss of the proximal right femur with large complex joint effusion. 4.  Large colonic stool burden suggestive of constipation.

## 2023-06-10 NOTE — SWALLOW BEDSIDE ASSESSMENT ADULT - SLP GENERAL OBSERVATIONS
Pt was seen fully awake and alert, HOB fully elevated, on room air. A&Ox3, followed simple directives, and communicated wants/needs. Motor speech exam (sequential and alternating DDK tasks and in conversation) revealed no dysarthria. Sustained phonation <15 seconds (2 seconds). Soft vocal quality noted, though patient reports is baseline. Fluent and intelligible speech.
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
## 2023-06-10 NOTE — DISCHARGE NOTE NURSING/CASE MANAGEMENT/SOCIAL WORK - NSDCPEFALRISK_GEN_ALL_CORE
For information on Fall & Injury Prevention, visit: [email or web address] OR  [email or web address] OR  https://www.cdc.gov/steadi/patient.html

## 2023-06-10 NOTE — DISCHARGE NOTE PROVIDER - NSDCFUADDAPPT_GEN_ALL_CORE_FT
- Please follow up with your primary care doctor Rosangela Jarvis in 1-2 weeks after discharge   - Please schedule an appointment with Orthopedics, Dr. Kimble to follow up regarding your fracture

## 2023-06-10 NOTE — H&P ADULT - PROBLEM SELECTOR PLAN 7
Patient with decubitus ulcer will continue with nystatin powder.   -Wound Care Consult Patient with decubitus ulcer will continue with nystatin powder. Patient deferring exam do to pain  -Wound Care Consult  -Continue Nystatin Patient with decubitus ulcer will continue with nystatin powder. Patient deferring exam do to pain  -Continue Nystatin Patient with reports decubitus ulcer however lesion appears red in nature,  will continue with nystatin powder for now.   -Continue Nystatin Patient with reports prior history decubitus ulcer that has healed however has red appearing lesion on her back according to nursing. Patient uses Nystatin powder on this location.   -Continue Nystatin

## 2023-06-10 NOTE — H&P ADULT - NSHPLABSRESULTS_GEN_ALL_CORE
.  LABS:                         11.8   3.96  )-----------( 178      ( 2023 19:08 )             35.1         134<L>  |  102  |  14  ----------------------------<  84  4.4   |  25  |  0.25<L>    Ca    8.9      2023 19:08    TPro  x   /  Alb  x   /  TBili  0.3  /  DBili  <0.2  /  AST  x   /  ALT  x   /  AlkPhos  x         Urinalysis Basic - ( 2023 19:08 )    Color: Yellow / Appearance: Clear / S.010 / pH: x  Gluc: x / Ketone: NEGATIVE  / Bili: Negative / Urobili: 0.2 E.U./dL   Blood: x / Protein: NEGATIVE mg/dL / Nitrite: POSITIVE   Leuk Esterase: NEGATIVE / RBC: 5-10 /HPF / WBC 5-10 /HPF   Sq Epi: x / Non Sq Epi: x / Bacteria: Many /HPF            Lactate, Blood: 1.0 mmol/L ( @ 19:08)      RADIOLOGY, EKG & ADDITIONAL TESTS: Reviewed.

## 2023-06-10 NOTE — H&P ADULT - PROBLEM SELECTOR PLAN 6
Patient with history of neuropathy currently on Gabapentin 300mg at 5 AM and 1PM, with Gabapentin 600mg at night.     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     -Gabapentin 300mg at 5 AM  -Gabapentin 300mg at 1PM   -Gabapentin 600mg at bedtime  -Flexril 5mg TID Patient with history of neuropathy from neck trauma currently on Gabapentin 300mg at 5 AM and 1PM, with Gabapentin 600mg at night.     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 Current pain is likely secondary to neuropathy. We will increase gabapentin dose and request neuro consult in AM    -Increase Gabapentin to 400mg at 5 AM  -Increase Gabapentin to 400mg at 1PM   -Increase Gabapentin to 700mg at bedtime

## 2023-06-10 NOTE — H&P ADULT - PROBLEM SELECTOR PLAN 2
Patient with imaging of CT abdomen  and pelvis with chronic appearing right femoral neck fracture. Ortho consulted with plan for surgical intervention. Patient with constipation noted on CT limiting excessive opoid use for pain control  -NPO,   -NWB RLE    -NPO  -Hold AC   -Tylenol 650 prn for mild pain  -Ketorolac 15mg prn for moderate pain  -Morphine 2mg IV push for severe pain Patient with active chest and shoulder pain with radiation down the arm and into the neck that she describes as severe however not similar to previous history of MI.  Patient can not ambulate due to quadriplegia. She states she had a echo years ago however does not know the results. EKG with T wave inversion in V2 that are chronic in nature.    Patient with less than four metabolic equivalent with RCRI Class 2 risk due to history of MI, and patient now experiencing chest pain. She will require cardiac preoperative examination to determine if optimized for surgery.

## 2023-06-10 NOTE — PATIENT PROFILE ADULT - FALL HARM RISK - HARM RISK INTERVENTIONS
Assistance with ambulation/Assistance OOB with selected safe patient handling equipment/Communicate Risk of Fall with Harm to all staff/Discuss with provider need for PT consult/Monitor for mental status changes/Monitor gait and stability/Provide patient with walking aids - walker, cane, crutches/Reinforce activity limits and safety measures with patient and family/Tailored Fall Risk Interventions/Use of alarms - bed, chair and/or voice tab/Visual Cue: Yellow wristband and red socks/Bed in lowest position, wheels locked, appropriate side rails in place/Call bell, personal items and telephone in reach/Instruct patient to call for assistance before getting out of bed or chair/Non-slip footwear when patient is out of bed/[place] to call system/Physically safe environment - no spills, clutter or unnecessary equipment/Purposeful Proactive Rounding/Room/bathroom lighting operational, light cord in reach
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
## 2023-06-10 NOTE — CONSULT NOTE ADULT - SUBJECTIVE AND OBJECTIVE BOX
Orthopaedic Surgery Consult Note    For Surgeon: Dr. Badillo    HPI:  53yFemale w/ quadraplegia  presenting today with chronic R Femoral neck fracture sustained unknown time.    Patient at baseline bed bound, quadraplegic.    Denies HS/LOC. No other MSK complaints.    PMHx - Quadraplegia    PSx - Urostomy    Social (-)Tobacco (-)EtOh (-)Elicit substance      Vital Signs Last 24 Hrs  T(C): 36.4 (09 Jun 2023 18:52), Max: 36.4 (09 Jun 2023 18:52)  T(F): 97.6 (09 Jun 2023 18:52), Max: 97.6 (09 Jun 2023 18:52)  HR: 54 (09 Jun 2023 20:10) (54 - 56)  BP: 159/99 (09 Jun 2023 20:10) (77/50 - 159/99)  BP(mean): --  RR: 18 (09 Jun 2023 18:52) (18 - 18)  SpO2: 98% (09 Jun 2023 18:52) (98% - 98%)    Parameters below as of 09 Jun 2023 18:52  Patient On (Oxygen Delivery Method): room air        Physical Exam:  PEx:    AVSS  Gen: NAD, AOx3    RLE  Skin intact no open injuries; shortened, externally rotated,   Motor/sensory exam deferred 2/2 udraplegia  Palpable DP & PT  Brisk cap refill distally                          11.8   3.96  )-----------( 178      ( 09 Jun 2023 19:08 )             35.1     06-09    134<L>  |  102  |  14  ----------------------------<  84  4.4   |  25  |  0.25<L>    Ca    8.9      09 Jun 2023 19:08    TPro  x   /  Alb  x   /  TBili  0.3  /  DBili  <0.2  /  AST  x   /  ALT  x   /  AlkPhos  x   06-09      Imaging: Pending    A/P: 53yFemale w/ chronic appearing  R femoral neck fracture.     - Admit to Medicine for medical clearance for surgery and treatment for aspiration pna  - Med clearance/Anesthesia clearance  - Pre-op labs and imaging - CXR, EKG, COVID, CBC, BMP, T&S/ABO, PT/PTT/INR, pregnancy test  - NPO   - NWB RLE  - AC please hold  - Analgesia per primary    -Discussed with Dr. García Pager [phone]    ***incomplete*** Orthopaedic Surgery Consult Note  For Surgeon: Dr. Badillo    HPI:  53yFemale w/ quadraplegia  presenting today with chronic R Femoral neck fracture sustained unknown time.  Patient at baseline bed bound, quadraplegic.  Denies HS/LOC. No other MSK complaints.    PMHx - Quadraplegia, MIx2  PSx - Urostomy  Social (-)Tobacco (-)EtOh (-)Elicit substance      Vital Signs Last 24 Hrs  T(C): 36.4 (09 Jun 2023 18:52), Max: 36.4 (09 Jun 2023 18:52)  T(F): 97.6 (09 Jun 2023 18:52), Max: 97.6 (09 Jun 2023 18:52)  HR: 54 (09 Jun 2023 20:10) (54 - 56)  BP: 159/99 (09 Jun 2023 20:10) (77/50 - 159/99)  BP(mean): --  RR: 18 (09 Jun 2023 18:52) (18 - 18)  SpO2: 98% (09 Jun 2023 18:52) (98% - 98%)    Parameters below as of 09 Jun 2023 18:52  Patient On (Oxygen Delivery Method): room air        Physical Exam:  PEx:    AVSS  Gen: NAD, AOx3    RLE  Skin intact no open injuries; shortened, externally rotated,   Motor/sensory exam deferred 2/2 Quadraplegia  Palpable DP & PT  Brisk cap refill distally                          11.8   3.96  )-----------( 178      ( 09 Jun 2023 19:08 )             35.1     06-09    134<L>  |  102  |  14  ----------------------------<  84  4.4   |  25  |  0.25<L>    Ca    8.9      09 Jun 2023 19:08    TPro  x   /  Alb  x   /  TBili  0.3  /  DBili  <0.2  /  AST  x   /  ALT  x   /  AlkPhos  x   06-09      Imaging: Pending    A/P: 53yFemale w/ chronic appearing  R proximal femur fracture. At this time give chronicity of injury, non-ambulatory bed bound status, opt for non-operative management        - No acute orthopedic surgical intervention at this time  - Admit to Medicine for medical comorbidities and treatment for aspiration pna  - Med clearance/Anesthesia clearance if opt fo surgery  - labs and imaging - CXR, EKG, COVID, CBC, BMP, T&S/ABO, PT/PTT/INR, pregnancy test  - NPO   - NWB RLE  - AC please hold  - Analgesia per primary      Please page with any questions or concers  Ortho Pager [phone]     Orthopaedic Surgery Consult Note  For Surgeon: Dr. Badillo    HPI:  53yFemale w/ quadraplegia  presenting today with chronic R Femoral neck fracture sustained unknown time.  Patient at baseline bed bound, quadraplegic.  Denies HS/LOC. No other MSK complaints.    PMHx - Quadraplegia, MIx2  PSx - Urostomy  Social (-)Tobacco (-)EtOh (-)Elicit substance      Vital Signs Last 24 Hrs  T(C): 36.4 (09 Jun 2023 18:52), Max: 36.4 (09 Jun 2023 18:52)  T(F): 97.6 (09 Jun 2023 18:52), Max: 97.6 (09 Jun 2023 18:52)  HR: 54 (09 Jun 2023 20:10) (54 - 56)  BP: 159/99 (09 Jun 2023 20:10) (77/50 - 159/99)  BP(mean): --  RR: 18 (09 Jun 2023 18:52) (18 - 18)  SpO2: 98% (09 Jun 2023 18:52) (98% - 98%)    Parameters below as of 09 Jun 2023 18:52  Patient On (Oxygen Delivery Method): room air        Physical Exam:  PEx:    AVSS  Gen: NAD, AOx3    RLE  Skin intact no open injuries; shortened, externally rotated,   Motor/sensory exam deferred 2/2 Quadraplegia  Palpable DP & PT  Brisk cap refill distally                          11.8   3.96  )-----------( 178      ( 09 Jun 2023 19:08 )             35.1     06-09    134<L>  |  102  |  14  ----------------------------<  84  4.4   |  25  |  0.25<L>    Ca    8.9      09 Jun 2023 19:08    TPro  x   /  Alb  x   /  TBili  0.3  /  DBili  <0.2  /  AST  x   /  ALT  x   /  AlkPhos  x   06-09      Imaging: Pending    A/P: 53yFemale w/ chronic appearing  R proximal femur fracture. At this time give chronicity of injury, non-ambulatory bed bound status, opt for non-operative management        - No acute orthopedic surgical intervention at this time  - Admit to Medicine for medical comorbidities and treatment for aspiration pna  - Med clearance/Anesthesia clearance if opt fo surgery  - labs and imaging - CXR, EKG, COVID, CBC, BMP, T&S/ABO, PT/PTT/INR, pregnancy test  - NPO   - NWB RLE  - AC please hold  - Analgesia per primary      Please page with any questions or concerns        Ortho Pager [phone]
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
## 2023-06-10 NOTE — SWALLOW BEDSIDE ASSESSMENT ADULT - PHARYNGEAL PHASE
Seemingly timely swallow and adequate hyolaryngeal elevation/excursion. No clinical indicators of penetration/aspiration or pharyngeal inefficiency noted.

## 2023-06-10 NOTE — DISCHARGE NOTE PROVIDER - CARE PROVIDER_API CALL
Maria E Kimble  Orthopaedic Surgery  [address] [address]  Phone: [phone]  Fax: [phone]  Follow Up Time:
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
## 2023-06-10 NOTE — SWALLOW BEDSIDE ASSESSMENT ADULT - SWALLOW EVAL: DIAGNOSIS
Seemingly functional oropharyngeal swallow. No clinical indicators of penetration/aspiration or pharyngeal inefficiency. Low suspicion chest imaging is related to aspiration 2/2 pharyngeal dysfunction. No further acute intervention deemed warranted at this time. Re-consult if change in status.

## 2023-06-10 NOTE — H&P ADULT - PROBLEM SELECTOR PLAN 8
F: None  E: K>4 and Mg>2  N: NPO   DVT: On hold due to surgical procedure Patient complaining of maculopapular rash on the neck and forehead that began wednesday. She states she had seen her PCP and was prescribed a topical cream. She was told it was hives however does not report any history of allergy, perfumes, detergents or new skin contacts. She was prescribed a topical steroid cream.  -Triamcinolone 0.1% Cream Patient complaining of maculopapular rash on the neck and forehead that began Wednesday. She states she had seen her PCP and was prescribed a topical steroid cream. She was told it was hives however does not report any history of allergy, wheezing, or shortness of breath at that time. She does not have any new exposure to perfumes, detergents or any skin contacts.   -Triamcinolone 0.1% Cream
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
## 2023-06-10 NOTE — H&P ADULT - PROBLEM SELECTOR PLAN 1
Patient with imaging findings  with debris located in left lower bronchus representing aspiration versus small air way disease Patient originally treated for aspiration pneumonia in the ED which was presumed cause of chest pain. However the patient does not have systemic signs of infection. She reports a cough which is chronic and unchanged in nature. Her current chest pain is described as neuropathic pain that has resulted from her neck trauma and is likely neurogenic.  However the patient reports a history of MI and pain that is located in the jaw and neck region. Her EKG does not reveal ischemia, however troponin were not ordered.   -Repeat EKG  -Troponin  -CK   -CKMB Patient with imaging of CT abdomen  and pelvis with chronic appearing right femoral neck fracture. Ortho consulted with plan for surgical intervention. Patient with constipation noted on CT limiting excessive opoid use for pain control  -NPO,   -NWB RLE    -NPO  -Hold AC   -Tylenol 650 prn for mild pain  -Ketorolac 15mg prn for moderate pain  -Morphine 2mg IV push for severe pain Patient with imaging of CT abdomen  and pelvis with chronic appearing right femoral neck fracture. Ortho consulted with plan for surgical intervention. Patient with constipation noted on CT limiting excessive opoid use for pain control. It is unclear how fracture occurred as patient does not report trauma  -NPO,   -NWB RLE    -NPO  -Hold AC   -Tylenol 650 prn for mild pain  -Ketorolac 15mg prn for moderate pain  -Morphine 2mg IV push for severe pain Patient with imaging of CT abdomen  and pelvis with chronic appearing right femoral neck fracture. Ortho consulted in ED with initial plan for surgical intervention, however revised recommendation. Orthopedic team has requested to keep patient NPO for potential intervention on 6/10. Further it is unclear how fracture occurred as patient is bed bound, does not report trauma, and site does not have severe pain. Patient with constipation noted on CT limiting excessive opoid use for pain control.   -NPO,   -NWB RLE    -NPO  -Hold AC   -Tylenol 650 prn for mild pain  -Ketorolac 15mg prn for moderate pain  -Morphine 2mg IV push for severe pain

## 2023-06-10 NOTE — SWALLOW BEDSIDE ASSESSMENT ADULT - ASR SWALLOW ASPIRATION MONITOR
HOB elevation as tolerated (45 degrees minimum)/oral hygiene/position upright (90Y)/cough/gurgly voice/throat clearing

## 2023-06-10 NOTE — H&P ADULT - NS ATTEST RISK PROBLEM GEN_ALL_CORE FT
Reviewed Medication Reconciliation, Labs , Old Records , Imaging , Discussion and Coordination of Care with consultants, High level MDM due to evaluation for potential etiology of chest pain , changing gabapentin dose,
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
## 2023-06-10 NOTE — H&P ADULT - NSHPPHYSICALEXAM_GEN_ALL_CORE
Constitutional: Appears uncomfortable in bed due to pain   Head: NC/AT  Eyes: PERRL, EOMI, anicteric sclera  ENT: no nasal discharge; uvula midline, no oropharyngeal erythema or exudates; MMM  Neck: supple; no JVD or thyromegaly  Respiratory: CTA B/L; no W/R/R, no retractions  Cardiac: +S1/S2; RRR; no M/R/G; PMI non-displaced  Gastrointestinal: soft, NT/ND; no rebound or guarding; +BSx4  Extremities: WWP, no clubbing or cyanosis; no peripheral edema  Vascular: 2+ radial, femoral, DP/PT pulses B/L  Dermatologic: skin warm, dry and intact; no rashes, wounds, or scars  Lymphatic: no submandibular or cervical LAD  Psychiatric: affect and characteristics of appearance, verbalizations, behaviors are appropriate Constitutional: Appears uncomfortable in bed due to pain   Head: NC/AT  Eyes: PERRL, EOMI, anicteric sclera  ENT: no nasal discharge; uvula midline, no oropharyngeal erythema or exudates; MMM  Neck: supple; no JVD or thyromegaly  Respiratory: CTA B/L; no W/R/R, no retractions  Cardiac: +S1/S2; RRR; no M/R/G; PMI non-displaced  Gastrointestinal: soft, NT/ND; no rebound or guarding; +BSx4  Extremities: WWP, no clubbing or cyanosis; no peripheral edema  Vascular: 2+ radial, femoral, DP/PT pulses B/L  Dermatologic: maculopapular rash on neck and small lession on foreadh  Lymphatic: no submandibular or cervical LAD  Psychiatric: affect and characteristics of appearance, verbalizations, behaviors are appropriate

## 2023-06-10 NOTE — CONSULT NOTE ADULT - ASSESSMENT
53 year old female w/ hx of MIx2 2001 2/2 to hypercoagulability, quadriplegia 2/2 trauma, chronic pain, p/w non cardiac sounding chest pain with negative troponin x2 and a non ischemic ECG. Now admitted for management of aspiration PNA and femoral #. CP now resolved w/ no recurrence.   Cardiac c/s for CP and pre op for R femoral hip #.     ECGs - Sinus. qR/rsR patter. Biatrial enlargement on ECG. No ischemic changes    #Chest pain - likely non cardiac    Negative troponin x2, non ischemic ECG   ECG is abnormal -- benefits from TTE to evaluate further.  Reasonable to obtain CCTA to assess atherosclerotic disease burden - non urgent and can be done outpatient. Should not hinder proceeding to surgery.     #Pre op   Unable to assess METs.  No cardiac contraindication to surgery.  Obtain TTE but can be done post op.        53 year old female w/ hx of ??cardiac venous embolus 2001 2/2 to hypercoagulability (estrogen based birth control), quadriplegia 2/2 trauma, chronic pain, p/w non cardiac sounding pain with negative troponin x2 and a non ischemic ECG. Now admitted for management of aspiration PNA and femoral #. Pain now resolved w/ no recurrence.   Cardiac c/s for CP and pre op for R femoral hip #.     ECGs - Sinus. qR/rsR patter. Biatrial enlargement on ECG. No ischemic changes    #Jaw/arm pain - likely non cardiac  Negative troponin x2, non ischemic ECG   ECG is abnormal -- benefits from TTE to evaluate further.  Reasonable to obtain CCTA to assess atherosclerotic disease burden - non urgent and can be done outpatient. Should not hinder proceeding to surgery.     Her hx of MI is questionable reports had a thombus in a "small vein" ??coronary sinus -- not artery. It was in the setting of hypercoagulability from her birth control.   Obtain TTE w/ bubble study to evaluate for intracardiac shunt -- if it was coronary artery thrombosis from hypercoag disorder may be in setting of intracardiac shunt.    #Abn ECG - etiol of atrial abnormality on ecg not clear, pt also w/ systolic murmur. TTE to define this further.     #Pre op   Unable to assess METs.  Obtain TTE to better define anatomy prior to surgery - if surgery is not urgent/emergent.

## 2023-06-14 LAB
CULTURE RESULTS: SIGNIFICANT CHANGE UP
CULTURE RESULTS: SIGNIFICANT CHANGE UP
SPECIMEN SOURCE: SIGNIFICANT CHANGE UP
SPECIMEN SOURCE: SIGNIFICANT CHANGE UP
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
## 2023-06-20 DIAGNOSIS — S72.001A FRACTURE OF UNSPECIFIED PART OF NECK OF RIGHT FEMUR, INITIAL ENCOUNTER FOR CLOSED FRACTURE: ICD-10-CM

## 2023-06-20 DIAGNOSIS — Z79.52 LONG TERM (CURRENT) USE OF SYSTEMIC STEROIDS: ICD-10-CM

## 2023-06-20 DIAGNOSIS — K59.00 CONSTIPATION, UNSPECIFIED: ICD-10-CM

## 2023-06-20 DIAGNOSIS — J98.11 ATELECTASIS: ICD-10-CM

## 2023-06-20 DIAGNOSIS — Z90.6 ACQUIRED ABSENCE OF OTHER PARTS OF URINARY TRACT: ICD-10-CM
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
## 2023-06-20 DIAGNOSIS — J69.0 PNEUMONITIS DUE TO INHALATION OF FOOD AND VOMIT: ICD-10-CM

## 2023-06-20 DIAGNOSIS — F12.90 CANNABIS USE, UNSPECIFIED, UNCOMPLICATED: ICD-10-CM

## 2023-06-20 DIAGNOSIS — G82.50 QUADRIPLEGIA, UNSPECIFIED: ICD-10-CM

## 2023-06-20 DIAGNOSIS — Z93.6 OTHER ARTIFICIAL OPENINGS OF URINARY TRACT STATUS: ICD-10-CM

## 2023-06-20 DIAGNOSIS — J41.0 SIMPLE CHRONIC BRONCHITIS: ICD-10-CM

## 2023-06-20 DIAGNOSIS — G62.9 POLYNEUROPATHY, UNSPECIFIED: ICD-10-CM

## 2023-06-20 DIAGNOSIS — I25.2 OLD MYOCARDIAL INFARCTION: ICD-10-CM

## 2023-06-20 DIAGNOSIS — R21 RASH AND OTHER NONSPECIFIC SKIN ERUPTION: ICD-10-CM

## 2023-06-20 DIAGNOSIS — Z79.899 OTHER LONG TERM (CURRENT) DRUG THERAPY: ICD-10-CM

## 2023-06-20 DIAGNOSIS — I95.9 HYPOTENSION, UNSPECIFIED: ICD-10-CM

## 2023-06-20 DIAGNOSIS — Z79.01 LONG TERM (CURRENT) USE OF ANTICOAGULANTS: ICD-10-CM

## 2023-06-20 DIAGNOSIS — Z79.2 LONG TERM (CURRENT) USE OF ANTIBIOTICS: ICD-10-CM

## 2023-07-03 NOTE — ED ADULT NURSE NOTE - CHIEF COMPLAINT QUOTE
Routine visit in the absence of CM/RN. Ms. Page Gonzalez was sitting in her recliner and confirmed that she was comfortable. She stated that she had not slept well the last 2 nights as her bed was very hard. This RN stated an order for a gel topper for delivery - 7/5/23 due to the holiday, would be placed. Wound care completed and was tolerated well (Review wound Addendum & interventions). Medication reconcilation completed with [employer] - 'T'. no refills required or supplies at this time. Notation of visit was made in the patient's diary by the front door. Progress note to be faxed 7/4/23. pt ARIANNA  from [facility] " for hypertension as per EMS pt BP was 150/79 " pt states " my bp is usually low. my back and all by body hurts "
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
## 2023-07-24 NOTE — DISCHARGE NOTE NURSING/CASE MANAGEMENT/SOCIAL WORK - NSFLUVACAGEDISCH_IMM_ALL_CORE
Contacted the patient's son (POA) to discuss pursuing amplification for the patient. He stated that he did not approve for her to come for the testing and he does not feel that pursing hearing aid/s at this time is appropriate due to her dementia. He stated that she would just lose a hearing aid and could not manage one. I did state that it is possible that staff at [facility] could assist with the hearing aid and perhaps secure it overnight to prevent loss. Mr. Rex Vargas would possibly consider approving a hearing aid if Medicare paid for the hearing aid. He did ask if an amplifier could benefit Rumarine and I stated that it could as long as precautions were taken to prevent over amplification.  I did speak with staff at [facility] to share what was discussed regarding a hearing aid for Nadir.
Adult

## 2023-10-10 NOTE — ED PROVIDER NOTE - CLINICAL SUMMARY MEDICAL DECISION MAKING FREE TEXT BOX
done
avss. nontoxic. NAD. found to have dehydration and stage III decub ulcer on exam. no e/o sepsis. no sig anemia. no electrolyte abnl. s/p ivf. repeat labs pending per admitting team. will admit.

## 2023-10-18 NOTE — PROGRESS NOTE ADULT - PROBLEM/PLAN-6
DISPLAY PLAN FREE TEXT
DISPLAY PLAN FREE TEXT
Detail Level: Detailed
Patient Specific Counseling (Will Not Stick From Patient To Patient): Repair completed by [facility] [place]
DISPLAY PLAN FREE TEXT
Patient Specific Counseling (Will Not Stick From Patient To Patient): Courtesy destruction
Detail Level: Zone
DISPLAY PLAN FREE TEXT
Detail Level: Generalized
DISPLAY PLAN FREE TEXT

## 2023-11-29 NOTE — ED ADULT NURSE NOTE - NEURO MENTATION
Subjective:      Patient ID: Kareem Burks is a 15 m.o. female. MATIAS Azul presents to clinic with concern for cough and congestion that began 2 days ago. Mom has similar symptoms. She has had a fever with a tmax of 101. No treatments attempted. Review of Systems   All other systems reviewed and are negative. Objective:   Physical Exam  Vitals reviewed. Constitutional:       General: She is active. She is not in acute distress. Appearance: She is well-developed. HENT:      Head: Atraumatic. Right Ear: Tympanic membrane normal.      Left Ear: Tympanic membrane normal.      Nose: Rhinorrhea present. Mouth/Throat:      Mouth: Mucous membranes are moist.      Pharynx: Oropharynx is clear. Tonsils: No tonsillar exudate. Eyes:      General:         Right eye: No discharge. Left eye: No discharge. Conjunctiva/sclera: Conjunctivae normal.   Cardiovascular:      Rate and Rhythm: Normal rate and regular rhythm. Heart sounds: No murmur heard. Pulmonary:      Effort: Pulmonary effort is normal. No respiratory distress. Breath sounds: Normal breath sounds. No wheezing. Abdominal:      General: Bowel sounds are normal. There is no distension. Palpations: Abdomen is soft. Tenderness: There is no abdominal tenderness. Musculoskeletal:         General: No deformity or signs of injury. Cervical back: Normal range of motion and neck supple. Skin:     General: Skin is warm and dry. Capillary Refill: Capillary refill takes less than 2 seconds. Coloration: Skin is not jaundiced. Findings: No rash. Neurological:      General: No focal deficit present. Mental Status: She is alert. Motor: No abnormal muscle tone.        Results for orders placed or performed in visit on 11/29/23   Respiratory Panel, Molecular, with COVID-19 (Restricted: peds pts or suitable admitted adults)    Specimen: Nasopharyngeal   Result Value Ref Range normal

## 2024-01-25 NOTE — PATIENT PROFILE ADULT - FUNCTIONAL ASSESSMENT - DAILY ACTIVITY 5.
Requested Prescriptions     Pending Prescriptions Disp Refills    HYDROcodone-acetaminophen (NORCO) 7.5-325 MG per tablet 30 tablet 0     Sig: Take 1 tablet by mouth as needed for Pain for up to 30 days. Must last 30 days.       Last Office Visit:  12/18/2023  Next Office Visit:  none but will set up today  Last Medication Refill:  12/6/23 per Dave Acosta up to date:  1/25/24    *RX updated to reflect   1/25/24  fill date*      Script sent on 1/8/24 was cancelled by pharmacy due to pt not being able to get at that time.     
1 = Total assistance

## 2024-06-14 NOTE — ED ADULT TRIAGE NOTE - CCCP TRG CHIEF CMPLNT
Adductor canal (targeting saphenous nerve) Procedure Note    Pre-Procedure   Staff -        Anesthesiologist:  Dejuan Cassidy MD       Performed By: anesthesiologist       Location: pre-op       Pre-Anesthestic Checklist: patient identified, IV checked, site marked, risks and benefits discussed, informed consent, monitors and equipment checked, pre-op evaluation, at physician/surgeon's request and post-op pain management  Timeout:       Correct Patient: Yes        Correct Procedure: Yes        Correct Site: Yes        Correct Position: Yes        Correct Laterality: Yes        Site Marked: Yes  Procedure Documentation  Procedure: Adductor canal (targeting saphenous nerve)       Patient Position: supine       Skin prep: Chloraprep       Local skin infiltrated with 1 mL of 1% lidocaine.        Needle Type: insulated       Needle Gauge: 21.        Needle Length (millimeters): 90        Ultrasound guided       1. Ultrasound was used to identify targeted nerve, plexus, vascular marker, or fascial plane and place a needle adjacent to it in real-time.       2. Ultrasound was used to visualize the spread of anesthetic in close proximity to the above referenced structure.       3. A permanent image is entered into the patient's record.       4. The visualized anatomic structures appeared normal.       5. There were no apparent abnormal pathologic findings.    Assessment/Narrative         The placement was negative for: blood aspirated, painful injection and site bleeding       Paresthesias: No.       Bolus given via needle..        Secured via.        Insertion/Infusion Method: Single Shot       Complications: none     Comments:  Bolus via needle, 10 mL of 0.5% ropivacaine with 1:400,000 epinephrine.    Under ultrasound guidance, a 21 gauge needle was inserted and placed in close proximity to the saphenous nerve. Ultrasound was also used to visualize the spread of anesthetic in close proximity to the nerve being  "blocked. The nerve appeared anatomically normal, and there were no apparent abnormal pathological findings. Patient tolerated well, was mildly sedated but communicative throughout the procedure. A permanent ultrasound image was saved in the patient's record.    The surgeon has given a verbal order transferring care of this patient to me for the performance of regional analgesia block for post op pain control. It is requested of me because I am uniquely trained and qualified to perform this block and the surgeon is neither trained nor qualified to perform this procedure.         FOR [facility] ([facility]) ONLY:   Pain Team Contact information: please page the Pain Team [employer] Search \"Pain\". During daytime hours, please page the attending first. At night please page the resident first.      " hypertension

## 2024-06-20 ENCOUNTER — EMERGENCY (EMERGENCY)
Facility: HOSPITAL | Age: 54
LOS: 1 days | Discharge: HOME | End: 2024-06-20
Attending: EMERGENCY MEDICINE | Admitting: EMERGENCY MEDICINE
Payer: MEDICARE

## 2024-06-20 VITALS
RESPIRATION RATE: 18 BRPM | OXYGEN SATURATION: 98 % | HEART RATE: 102 BPM | DIASTOLIC BLOOD PRESSURE: 100 MMHG | SYSTOLIC BLOOD PRESSURE: 155 MMHG | TEMPERATURE: 99 F

## 2024-06-20 DIAGNOSIS — R00.0 TACHYCARDIA, UNSPECIFIED: ICD-10-CM

## 2024-06-20 DIAGNOSIS — R25.2 CRAMP AND SPASM: ICD-10-CM

## 2024-06-20 DIAGNOSIS — R05.9 COUGH, UNSPECIFIED: ICD-10-CM

## 2024-06-20 DIAGNOSIS — Z20.822 CONTACT WITH AND (SUSPECTED) EXPOSURE TO COVID-19: ICD-10-CM

## 2024-06-20 DIAGNOSIS — Z98.890 OTHER SPECIFIED POSTPROCEDURAL STATES: Chronic | ICD-10-CM

## 2024-06-20 DIAGNOSIS — R07.9 CHEST PAIN, UNSPECIFIED: ICD-10-CM

## 2024-06-20 DIAGNOSIS — R06.02 SHORTNESS OF BREATH: ICD-10-CM

## 2024-06-20 DIAGNOSIS — R60.0 LOCALIZED EDEMA: ICD-10-CM

## 2024-06-20 DIAGNOSIS — G82.50 QUADRIPLEGIA, UNSPECIFIED: ICD-10-CM

## 2024-06-20 PROCEDURE — 99284 EMERGENCY DEPT VISIT MOD MDM: CPT

## 2024-06-20 PROCEDURE — 99053 MED SERV 10PM-8AM 24 HR FAC: CPT

## 2024-06-20 NOTE — ED ADULT TRIAGE NOTE - CHIEF COMPLAINT QUOTE
Shortness of breath x 1 day, pt endorses she began feeling difficulty breathing today, hx of PNA in the past, pt is paraplegic, Pt arrived via EMS on oxygen, off oxygen pt saturating at 96 percent. ECG done in triage

## 2024-06-20 NOTE — ED ADULT TRIAGE NOTE - SPO2 (%)
NEUROINTERVENTIONAL RADIOLOGY BRIEF POST-OPERATIVE NOTE    Procedures:   1. Cerebral angiography, left carotid circulation  2. Transcatheter intra-arterial infusion of verapamil, left MCA and left ICA    Pre-operative Diagnosis: Cerebral vasospasm    Post-operative Diagnosis:   1. No flow-limiting cerebral vasospasm  2. Stable coiled aneurysms, distal left ICA    Anesthesia: General    Medications:    Verapamil 17 mg IA into left MCA and left ICA. Lidocaine 2% SQ. Estimated Blood Loss: < 75 mL    Complications: None    Brief procedure description: Unsuccessful access of right common femoral artery. Accessed left common femoral artery. Cerebral angiogram of left ICA. Major branches of left MCA and ENRRIQUE appeared to fill without significant stenosis. Cerebral perfusion appeared uniform. Infused 10 mg verapamil into distal M1 segment of left MCA and 7 mg verapamil into left ICA. No significant change in appearance of cerebral arteries before and after infusion.  Hemostasis achieved with 6-Syriac Angio-Seal closure device.      Electronically signed by Denita Woody MD on 9/11/2018 at 7:33 PM 98

## 2024-06-21 VITALS — HEART RATE: 94 BPM

## 2024-06-21 LAB
ANION GAP SERPL CALC-SCNC: 17 MMOL/L — SIGNIFICANT CHANGE UP (ref 5–17)
BASOPHILS # BLD AUTO: 0.03 K/UL — SIGNIFICANT CHANGE UP (ref 0–0.2)
BASOPHILS NFR BLD AUTO: 0.2 % — SIGNIFICANT CHANGE UP (ref 0–2)
BUN SERPL-MCNC: 16 MG/DL — SIGNIFICANT CHANGE UP (ref 7–23)
CALCIUM SERPL-MCNC: 10.2 MG/DL — SIGNIFICANT CHANGE UP (ref 8.4–10.5)
CHLORIDE SERPL-SCNC: 97 MMOL/L — SIGNIFICANT CHANGE UP (ref 96–108)
CO2 SERPL-SCNC: 21 MMOL/L — LOW (ref 22–31)
CREAT SERPL-MCNC: 0.31 MG/DL — LOW (ref 0.5–1.3)
EGFR: 126 ML/MIN/1.73M2 — SIGNIFICANT CHANGE UP
EOSINOPHIL # BLD AUTO: 0.01 K/UL — SIGNIFICANT CHANGE UP (ref 0–0.5)
EOSINOPHIL NFR BLD AUTO: 0.1 % — SIGNIFICANT CHANGE UP (ref 0–6)
FLUAV AG NPH QL: SIGNIFICANT CHANGE UP
FLUBV AG NPH QL: SIGNIFICANT CHANGE UP
GLUCOSE SERPL-MCNC: 149 MG/DL — HIGH (ref 70–99)
HCT VFR BLD CALC: 40.3 % — SIGNIFICANT CHANGE UP (ref 34.5–45)
HGB BLD-MCNC: 13.4 G/DL — SIGNIFICANT CHANGE UP (ref 11.5–15.5)
IMM GRANULOCYTES NFR BLD AUTO: 0.3 % — SIGNIFICANT CHANGE UP (ref 0–0.9)
LYMPHOCYTES # BLD AUTO: 0.81 K/UL — LOW (ref 1–3.3)
LYMPHOCYTES # BLD AUTO: 6.3 % — LOW (ref 13–44)
MCHC RBC-ENTMCNC: 30.9 PG — SIGNIFICANT CHANGE UP (ref 27–34)
MCHC RBC-ENTMCNC: 33.3 GM/DL — SIGNIFICANT CHANGE UP (ref 32–36)
MCV RBC AUTO: 93.1 FL — SIGNIFICANT CHANGE UP (ref 80–100)
MONOCYTES # BLD AUTO: 0.61 K/UL — SIGNIFICANT CHANGE UP (ref 0–0.9)
MONOCYTES NFR BLD AUTO: 4.8 % — SIGNIFICANT CHANGE UP (ref 2–14)
NEUTROPHILS # BLD AUTO: 11.31 K/UL — HIGH (ref 1.8–7.4)
NEUTROPHILS NFR BLD AUTO: 88.3 % — HIGH (ref 43–77)
NRBC # BLD: 0 /100 WBCS — SIGNIFICANT CHANGE UP (ref 0–0)
PLATELET # BLD AUTO: 307 K/UL — SIGNIFICANT CHANGE UP (ref 150–400)
POTASSIUM SERPL-MCNC: 4.3 MMOL/L — SIGNIFICANT CHANGE UP (ref 3.5–5.3)
POTASSIUM SERPL-SCNC: 4.3 MMOL/L — SIGNIFICANT CHANGE UP (ref 3.5–5.3)
RBC # BLD: 4.33 M/UL — SIGNIFICANT CHANGE UP (ref 3.8–5.2)
RBC # FLD: 15.7 % — HIGH (ref 10.3–14.5)
RSV RNA NPH QL NAA+NON-PROBE: SIGNIFICANT CHANGE UP
SARS-COV-2 RNA SPEC QL NAA+PROBE: SIGNIFICANT CHANGE UP
SODIUM SERPL-SCNC: 135 MMOL/L — SIGNIFICANT CHANGE UP (ref 135–145)
WBC # BLD: 12.81 K/UL — HIGH (ref 3.8–10.5)
WBC # FLD AUTO: 12.81 K/UL — HIGH (ref 3.8–10.5)

## 2024-06-21 PROCEDURE — 71045 X-RAY EXAM CHEST 1 VIEW: CPT | Mod: 26

## 2024-06-21 PROCEDURE — 87637 SARSCOV2&INF A&B&RSV AMP PRB: CPT

## 2024-06-21 PROCEDURE — 71045 X-RAY EXAM CHEST 1 VIEW: CPT

## 2024-06-21 PROCEDURE — 36415 COLL VENOUS BLD VENIPUNCTURE: CPT

## 2024-06-21 PROCEDURE — 99285 EMERGENCY DEPT VISIT HI MDM: CPT | Mod: 25

## 2024-06-21 PROCEDURE — 80048 BASIC METABOLIC PNL TOTAL CA: CPT

## 2024-06-21 PROCEDURE — 85025 COMPLETE CBC W/AUTO DIFF WBC: CPT

## 2024-06-21 RX ORDER — BACLOFEN 100 %
20 POWDER (GRAM) MISCELLANEOUS ONCE
Refills: 0 | Status: COMPLETED | OUTPATIENT
Start: 2024-06-21 | End: 2024-06-21

## 2024-06-21 RX ORDER — GABAPENTIN 400 MG/1
600 CAPSULE ORAL ONCE
Refills: 0 | Status: COMPLETED | OUTPATIENT
Start: 2024-06-21 | End: 2024-06-21

## 2024-06-21 RX ADMIN — Medication 20 MILLIGRAM(S): at 06:02

## 2024-06-21 RX ADMIN — GABAPENTIN 600 MILLIGRAM(S): 400 CAPSULE ORAL at 06:02
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
## 2024-06-21 NOTE — ED PROVIDER NOTE - NSFOLLOWUPINSTRUCTIONS_ED_ALL_ED_FT
It is important to continue following up with your doctors outside the hospital and to return to ER for: Persistent fever/vomiting, uncontrolled pain, worsening swelling, worsening breathing, worsening lightheaded, spreading redness, worsening cough.     Cough    Coughing is a reflex that clears your throat and your airways. Coughing helps to heal and protect your lungs. It is normal to cough occasionally, but a cough that happens with other symptoms or lasts a long time may be a sign of a condition that needs treatment. Coughing may be caused by infections, asthma or COPD, smoking, postnasal drip, gastroesophageal reflux, as well as other medical conditions. Take medicines only as instructed by your health care provider. Avoid environments or triggers that causes you to cough at work or at home.    SEEK IMMEDIATE MEDICAL CARE IF YOU HAVE ANY OF THE FOLLOWING SYMPTOMS: coughing up blood, shortness of breath, rapid heart rate, chest pain, unexplained weight loss or night sweats.

## 2024-06-21 NOTE — ED ADULT NURSE NOTE - PAIN RATING/NUMBER SCALE (0-10): ACTIVITY
-Resume home dose of envarsus 7 mg PO daily.  -Resume home dose of  mg PO BID.  -Resume home dose of prednisone 5 mg PO daily.  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 Continue allopurinol. 0 (no pain/absence of nonverbal indicators of pain)

## 2024-06-21 NOTE — ED ADULT NURSE NOTE - OBJECTIVE STATEMENT
Received via stretcher BIBEMS with chief complaints of sudden onset cough and shortness of breath tonight. Pt states "I came out of the shower and I just started nonstop coughing and became short of breath." I have history of pneumonia but this doesn't feel like it. Cough and shortness of breath resolved upon arrival to er. +urostomy.     Patient AOX4, speaking full sentences, caregiver @  bedside. Patient denies chest pain, shortness of breath, difficulty breathing and any form of distress not noted. Resps even and nonlabored. Moves all extremities. No obvious trauma/injury/deformity noted. Patient oriented to ED area. All needs attended. POC reviewed. Placed on continous cardiac monitoring. Fall risk precautions maintained. Yellow gown and red socks in place. Pt was instructed to ask for help to go to the bathroom, patient verbalized understanding. Purposeful proactive hourly rounding in progress.

## 2024-06-21 NOTE — ED PROVIDER NOTE - PROGRESS NOTE DETAILS
[place]: WBC 12.81, L shift, bicarb 21, other labs grossly wnl. Flu/COVID neg CXR wnl. Pt remains asymptomatic.   Discussed importance of outpt follow up and return precautions. Clinically no indication for further emergent ED workup or hospitalization at this time. Stable for dc, outpt f/u.

## 2024-06-21 NOTE — ED ADULT NURSE NOTE - ED STAT RN HANDOFF DETAILS
Pt refused vital signs and states "I just want to go home." Risk and benefits presented and verbalized understanding.

## 2024-06-21 NOTE — ED ADULT NURSE NOTE - NSFALLHARMRISKINTERV_ED_ALL_ED

## 2024-06-21 NOTE — ED PROVIDER NOTE - PATIENT PORTAL LINK FT
You can access the FollowMyHealth Patient Portal offered by [facility] by registering at the following website: [email or web address]. By joining [employer]’s FollowMyHealth portal, you will also be able to view your health information using other applications (apps) compatible with our system.

## 2024-06-21 NOTE — ED ADULT NURSE NOTE - IS THE PATIENT ABLE TO BE SCREENED?
Medicated patient per MAR. Patient reports tingling in fingers of right hand. Sling adjusted with no relief from tingling. Dr. Balderrama notified. Call light within reach.   Yes

## 2024-06-26 NOTE — ED PROVIDER NOTE - SECONDARY DIAGNOSIS.
31 y/o healthy M BIB [employer] under arrest for HA.  Pt reports he had not eaten all day and having HA.  He was given chips prior to arrival with resolution of headache.  No other symptoms.  Asymptomatic at this time.    Well appearing, lying comfortably in stretcher, awake and alert, nontoxic.  Afeb/VSS.  NCAT neck supple.  No focal neuro deficits.  All ext normal no deformities FROM.    HA resolved, normal exam.  Stable for dc, in police custody. Dyspnea

## 2024-07-23 NOTE — PATIENT PROFILE ADULT - NSPROMEDSADMININFO_GEN_A_NUR
Called and left detailed vm about appt. date and time on 07/31/24. I left message to come at appt. time & not any earlier & we would gladly take care of everything at that time of their follow up appt. I also left our number to call in case they can't make this appt. and needed to r/s. Patient was also advised of our new address to our NEW BUILDING so they won't get lost.    no concerns
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
## 2025-04-13 ENCOUNTER — INPATIENT (INPATIENT)
Facility: HOSPITAL | Age: 55
LOS: 1 days | Discharge: HOME | DRG: 193 | End: 2025-04-15
Attending: INTERNAL MEDICINE | Admitting: STUDENT IN AN ORGANIZED HEALTH CARE EDUCATION/TRAINING PROGRAM
Payer: MEDICARE

## 2025-04-13 VITALS
TEMPERATURE: 98 F | HEART RATE: 60 BPM | DIASTOLIC BLOOD PRESSURE: 52 MMHG | SYSTOLIC BLOOD PRESSURE: 81 MMHG | HEIGHT: 67 IN | OXYGEN SATURATION: 97 % | WEIGHT: 134.92 LBS | RESPIRATION RATE: 20 BRPM

## 2025-04-13 DIAGNOSIS — Z98.890 OTHER SPECIFIED POSTPROCEDURAL STATES: Chronic | ICD-10-CM

## 2025-04-13 LAB
ADD ON TEST-SPECIMEN IN LAB: SIGNIFICANT CHANGE UP
ALBUMIN SERPL ELPH-MCNC: 4.2 G/DL — SIGNIFICANT CHANGE UP (ref 3.3–5)
ALP SERPL-CCNC: 102 U/L — SIGNIFICANT CHANGE UP (ref 40–120)
ALT FLD-CCNC: 16 U/L — SIGNIFICANT CHANGE UP (ref 10–45)
ANION GAP SERPL CALC-SCNC: 12 MMOL/L — SIGNIFICANT CHANGE UP (ref 5–17)
ANION GAP SERPL CALC-SCNC: 14 MMOL/L — SIGNIFICANT CHANGE UP (ref 5–17)
APPEARANCE UR: CLEAR — SIGNIFICANT CHANGE UP
APTT BLD: 29.3 SEC — SIGNIFICANT CHANGE UP (ref 24.5–35.6)
AST SERPL-CCNC: 23 U/L — SIGNIFICANT CHANGE UP (ref 10–40)
BASE EXCESS BLDA CALC-SCNC: -3.4 MMOL/L — LOW (ref -2–3)
BASE EXCESS BLDA CALC-SCNC: 0.3 MMOL/L — SIGNIFICANT CHANGE UP (ref -2–3)
BASOPHILS # BLD AUTO: 0 K/UL — SIGNIFICANT CHANGE UP (ref 0–0.2)
BASOPHILS NFR BLD AUTO: 0 % — SIGNIFICANT CHANGE UP (ref 0–2)
BILIRUB SERPL-MCNC: 0.5 MG/DL — SIGNIFICANT CHANGE UP (ref 0.2–1.2)
BILIRUB UR-MCNC: NEGATIVE — SIGNIFICANT CHANGE UP
BUN SERPL-MCNC: 7 MG/DL — SIGNIFICANT CHANGE UP (ref 7–23)
BUN SERPL-MCNC: 8 MG/DL — SIGNIFICANT CHANGE UP (ref 7–23)
CALCIUM SERPL-MCNC: 8.9 MG/DL — SIGNIFICANT CHANGE UP (ref 8.4–10.5)
CALCIUM SERPL-MCNC: 9.1 MG/DL — SIGNIFICANT CHANGE UP (ref 8.4–10.5)
CHLORIDE SERPL-SCNC: 89 MMOL/L — LOW (ref 96–108)
CHLORIDE SERPL-SCNC: 96 MMOL/L — SIGNIFICANT CHANGE UP (ref 96–108)
CO2 BLDA-SCNC: 22 MMOL/L — SIGNIFICANT CHANGE UP (ref 19–24)
CO2 BLDA-SCNC: 26 MMOL/L — HIGH (ref 19–24)
CO2 SERPL-SCNC: 20 MMOL/L — LOW (ref 22–31)
CO2 SERPL-SCNC: 23 MMOL/L — SIGNIFICANT CHANGE UP (ref 22–31)
COLOR SPEC: SIGNIFICANT CHANGE UP
CREAT ?TM UR-MCNC: 19 MG/DL — SIGNIFICANT CHANGE UP
CREAT SERPL-MCNC: 0.19 MG/DL — LOW (ref 0.5–1.3)
CREAT SERPL-MCNC: 0.25 MG/DL — LOW (ref 0.5–1.3)
D DIMER BLD IA.RAPID-MCNC: <150 NG/ML DDU — SIGNIFICANT CHANGE UP
DIFF PNL FLD: NEGATIVE — SIGNIFICANT CHANGE UP
EGFR: 132 ML/MIN/1.73M2 — SIGNIFICANT CHANGE UP
EGFR: 132 ML/MIN/1.73M2 — SIGNIFICANT CHANGE UP
EGFR: 141 ML/MIN/1.73M2 — SIGNIFICANT CHANGE UP
EGFR: 141 ML/MIN/1.73M2 — SIGNIFICANT CHANGE UP
EOSINOPHIL # BLD AUTO: 0.12 K/UL — SIGNIFICANT CHANGE UP (ref 0–0.5)
EOSINOPHIL NFR BLD AUTO: 2.7 % — SIGNIFICANT CHANGE UP (ref 0–6)
FLUAV AG NPH QL: SIGNIFICANT CHANGE UP
FLUBV AG NPH QL: SIGNIFICANT CHANGE UP
GLUCOSE SERPL-MCNC: 125 MG/DL — HIGH (ref 70–99)
GLUCOSE SERPL-MCNC: 94 MG/DL — SIGNIFICANT CHANGE UP (ref 70–99)
GLUCOSE UR QL: NEGATIVE MG/DL — SIGNIFICANT CHANGE UP
HCO3 BLDA-SCNC: 21 MMOL/L — SIGNIFICANT CHANGE UP (ref 21–28)
HCO3 BLDA-SCNC: 25 MMOL/L — SIGNIFICANT CHANGE UP (ref 21–28)
HCT VFR BLD CALC: 30.1 % — LOW (ref 34.5–45)
HGB BLD-MCNC: 10.7 G/DL — LOW (ref 11.5–15.5)
HOROWITZ INDEX BLDA+IHG-RTO: 60 — SIGNIFICANT CHANGE UP
INR BLD: 0.88 — SIGNIFICANT CHANGE UP (ref 0.85–1.16)
KETONES UR-MCNC: 15 MG/DL
LACTATE SERPL-SCNC: 0.4 MMOL/L — LOW (ref 0.5–2)
LEUKOCYTE ESTERASE UR-ACNC: NEGATIVE — SIGNIFICANT CHANGE UP
LYMPHOCYTES # BLD AUTO: 0.16 K/UL — LOW (ref 1–3.3)
LYMPHOCYTES # BLD AUTO: 3.6 % — LOW (ref 13–44)
MCHC RBC-ENTMCNC: 33.3 PG — SIGNIFICANT CHANGE UP (ref 27–34)
MCHC RBC-ENTMCNC: 35.5 G/DL — SIGNIFICANT CHANGE UP (ref 32–36)
MCV RBC AUTO: 93.8 FL — SIGNIFICANT CHANGE UP (ref 80–100)
MONOCYTES # BLD AUTO: 0.24 K/UL — SIGNIFICANT CHANGE UP (ref 0–0.9)
MONOCYTES NFR BLD AUTO: 5.3 % — SIGNIFICANT CHANGE UP (ref 2–14)
NEUTROPHILS # BLD AUTO: 4.03 K/UL — SIGNIFICANT CHANGE UP (ref 1.8–7.4)
NEUTROPHILS NFR BLD AUTO: 88.4 % — HIGH (ref 43–77)
NITRITE UR-MCNC: NEGATIVE — SIGNIFICANT CHANGE UP
OSMOLALITY UR: 308 MOSM/KG — SIGNIFICANT CHANGE UP (ref 300–900)
PCO2 BLDA: 37 MMHG — SIGNIFICANT CHANGE UP (ref 32–45)
PCO2 BLDA: 38 MMHG — SIGNIFICANT CHANGE UP (ref 32–45)
PH BLDA: 7.37 — SIGNIFICANT CHANGE UP (ref 7.35–7.45)
PH BLDA: 7.42 — SIGNIFICANT CHANGE UP (ref 7.35–7.45)
PH UR: 7 — SIGNIFICANT CHANGE UP (ref 5–8)
PLATELET # BLD AUTO: 163 K/UL — SIGNIFICANT CHANGE UP (ref 150–400)
PO2 BLDA: 124 MMHG — HIGH (ref 83–108)
PO2 BLDA: 176 MMHG — HIGH (ref 83–108)
POTASSIUM SERPL-MCNC: 4.1 MMOL/L — SIGNIFICANT CHANGE UP (ref 3.5–5.3)
POTASSIUM SERPL-MCNC: 4.2 MMOL/L — SIGNIFICANT CHANGE UP (ref 3.5–5.3)
POTASSIUM SERPL-SCNC: 4.1 MMOL/L — SIGNIFICANT CHANGE UP (ref 3.5–5.3)
POTASSIUM SERPL-SCNC: 4.2 MMOL/L — SIGNIFICANT CHANGE UP (ref 3.5–5.3)
POTASSIUM UR-SCNC: 26 MMOL/L — SIGNIFICANT CHANGE UP
PROT ?TM UR-MCNC: 11 MG/DL — SIGNIFICANT CHANGE UP (ref 0–12)
PROT SERPL-MCNC: 6.5 G/DL — SIGNIFICANT CHANGE UP (ref 6–8.3)
PROT UR-MCNC: NEGATIVE MG/DL — SIGNIFICANT CHANGE UP
PROT/CREAT UR-RTO: 0.6 RATIO — HIGH (ref 0–0.2)
PROTHROM AB SERPL-ACNC: 10.3 SEC — SIGNIFICANT CHANGE UP (ref 9.9–13.4)
RBC # BLD: 3.21 M/UL — LOW (ref 3.8–5.2)
RBC # FLD: 15.8 % — HIGH (ref 10.3–14.5)
RSV RNA NPH QL NAA+NON-PROBE: SIGNIFICANT CHANGE UP
SAO2 % BLDA: 98.8 % — HIGH (ref 94–98)
SAO2 % BLDA: 98.9 % — HIGH (ref 94–98)
SARS-COV-2 RNA SPEC QL NAA+PROBE: SIGNIFICANT CHANGE UP
SODIUM SERPL-SCNC: 124 MMOL/L — LOW (ref 135–145)
SODIUM SERPL-SCNC: 130 MMOL/L — LOW (ref 135–145)
SODIUM UR-SCNC: 61 MMOL/L — SIGNIFICANT CHANGE UP
SOURCE RESPIRATORY: SIGNIFICANT CHANGE UP
SP GR SPEC: 1.01 — SIGNIFICANT CHANGE UP (ref 1–1.03)
TROPONIN T, HIGH SENSITIVITY RESULT: 15 NG/L — SIGNIFICANT CHANGE UP (ref 0–51)
UROBILINOGEN FLD QL: 0.2 MG/DL — SIGNIFICANT CHANGE UP (ref 0.2–1)
UUN UR-MCNC: 292 MG/DL — SIGNIFICANT CHANGE UP
WBC # BLD: 4.56 K/UL — SIGNIFICANT CHANGE UP (ref 3.8–10.5)
WBC # FLD AUTO: 4.56 K/UL — SIGNIFICANT CHANGE UP (ref 3.8–10.5)

## 2025-04-13 PROCEDURE — 93010 ELECTROCARDIOGRAM REPORT: CPT

## 2025-04-13 PROCEDURE — 99291 CRITICAL CARE FIRST HOUR: CPT | Mod: GC

## 2025-04-13 PROCEDURE — 99285 EMERGENCY DEPT VISIT HI MDM: CPT

## 2025-04-13 PROCEDURE — 71045 X-RAY EXAM CHEST 1 VIEW: CPT | Mod: 26

## 2025-04-13 PROCEDURE — 76937 US GUIDE VASCULAR ACCESS: CPT | Mod: 26

## 2025-04-13 PROCEDURE — 36620 INSERTION CATHETER ARTERY: CPT

## 2025-04-13 RX ORDER — BISACODYL 5 MG
10 TABLET, DELAYED RELEASE (ENTERIC COATED) ORAL AT BEDTIME
Refills: 0 | Status: DISCONTINUED | OUTPATIENT
Start: 2025-04-13 | End: 2025-04-15

## 2025-04-13 RX ORDER — AMITRIPTYLINE HYDROCHLORIDE 25 MG/1
25 TABLET, FILM COATED ORAL EVERY 24 HOURS
Refills: 0 | Status: DISCONTINUED | OUTPATIENT
Start: 2025-04-13 | End: 2025-04-15

## 2025-04-13 RX ORDER — SENNA 187 MG
2 TABLET ORAL AT BEDTIME
Refills: 0 | Status: DISCONTINUED | OUTPATIENT
Start: 2025-04-13 | End: 2025-04-15

## 2025-04-13 RX ORDER — BACLOFEN 10 MG/20ML
5 INJECTION INTRATHECAL EVERY 8 HOURS
Refills: 0 | Status: DISCONTINUED | OUTPATIENT
Start: 2025-04-13 | End: 2025-04-14

## 2025-04-13 RX ORDER — GABAPENTIN 400 MG/1
400 CAPSULE ORAL
Refills: 0 | Status: DISCONTINUED | OUTPATIENT
Start: 2025-04-13 | End: 2025-04-14

## 2025-04-13 RX ORDER — MAGNESIUM SULFATE 500 MG/ML
2 SYRINGE (ML) INJECTION ONCE
Refills: 0 | Status: COMPLETED | OUTPATIENT
Start: 2025-04-13 | End: 2025-04-13

## 2025-04-13 RX ORDER — METHYLPREDNISOLONE ACETATE 80 MG/ML
125 INJECTION, SUSPENSION INTRA-ARTICULAR; INTRALESIONAL; INTRAMUSCULAR; SOFT TISSUE ONCE
Refills: 0 | Status: COMPLETED | OUTPATIENT
Start: 2025-04-13 | End: 2025-04-13

## 2025-04-13 RX ORDER — POLYETHYLENE GLYCOL 3350 17 G/17G
17 POWDER, FOR SOLUTION ORAL DAILY
Refills: 0 | Status: DISCONTINUED | OUTPATIENT
Start: 2025-04-13 | End: 2025-04-14

## 2025-04-13 RX ORDER — CYCLOBENZAPRINE HYDROCHLORIDE 15 MG/1
5 CAPSULE, EXTENDED RELEASE ORAL THREE TIMES A DAY
Refills: 0 | Status: DISCONTINUED | OUTPATIENT
Start: 2025-04-13 | End: 2025-04-15

## 2025-04-13 RX ORDER — ALBUTEROL SULFATE 2.5 MG/3ML
2.5 VIAL, NEBULIZER (ML) INHALATION ONCE
Refills: 0 | Status: COMPLETED | OUTPATIENT
Start: 2025-04-13 | End: 2025-04-13

## 2025-04-13 RX ORDER — B1/B2/B3/B5/B6/B12/VIT C/FOLIC 500-0.5 MG
1 TABLET ORAL DAILY
Refills: 0 | Status: DISCONTINUED | OUTPATIENT
Start: 2025-04-13 | End: 2025-04-15

## 2025-04-13 RX ORDER — AZITHROMYCIN 250 MG
CAPSULE ORAL
Refills: 0 | Status: DISCONTINUED | OUTPATIENT
Start: 2025-04-13 | End: 2025-04-14

## 2025-04-13 RX ORDER — ENOXAPARIN SODIUM 100 MG/ML
40 INJECTION SUBCUTANEOUS EVERY 24 HOURS
Refills: 0 | Status: DISCONTINUED | OUTPATIENT
Start: 2025-04-13 | End: 2025-04-15

## 2025-04-13 RX ORDER — MAGNESIUM SULFATE 500 MG/ML
1 SYRINGE (ML) INJECTION ONCE
Refills: 0 | Status: COMPLETED | OUTPATIENT
Start: 2025-04-13 | End: 2025-04-14

## 2025-04-13 RX ORDER — METHYLPREDNISOLONE ACETATE 80 MG/ML
40 INJECTION, SUSPENSION INTRA-ARTICULAR; INTRALESIONAL; INTRAMUSCULAR; SOFT TISSUE EVERY 24 HOURS
Refills: 0 | Status: DISCONTINUED | OUTPATIENT
Start: 2025-04-14 | End: 2025-04-14

## 2025-04-13 RX ORDER — EPINEPHRINE 11.25MG/ML
0.5 SOLUTION, NON-ORAL INHALATION ONCE
Refills: 0 | Status: DISCONTINUED | OUTPATIENT
Start: 2025-04-13 | End: 2025-04-15

## 2025-04-13 RX ORDER — GABAPENTIN 400 MG/1
400 CAPSULE ORAL
Refills: 0 | Status: DISCONTINUED | OUTPATIENT
Start: 2025-04-13 | End: 2025-04-13

## 2025-04-13 RX ORDER — DIAZEPAM 2 MG/1
2 TABLET ORAL ONCE
Refills: 0 | Status: DISCONTINUED | OUTPATIENT
Start: 2025-04-13 | End: 2025-04-13

## 2025-04-13 RX ORDER — IPRATROPIUM BROMIDE AND ALBUTEROL SULFATE .5; 2.5 MG/3ML; MG/3ML
3 SOLUTION RESPIRATORY (INHALATION)
Refills: 0 | Status: COMPLETED | OUTPATIENT
Start: 2025-04-13 | End: 2025-04-13

## 2025-04-13 RX ORDER — CEFTRIAXONE 500 MG/1
2000 INJECTION, POWDER, FOR SOLUTION INTRAMUSCULAR; INTRAVENOUS EVERY 24 HOURS
Refills: 0 | Status: DISCONTINUED | OUTPATIENT
Start: 2025-04-13 | End: 2025-04-15

## 2025-04-13 RX ORDER — ALBUTEROL SULFATE 2.5 MG/3ML
2 VIAL, NEBULIZER (ML) INHALATION EVERY 6 HOURS
Refills: 0 | Status: DISCONTINUED | OUTPATIENT
Start: 2025-04-13 | End: 2025-04-13

## 2025-04-13 RX ORDER — AZITHROMYCIN 250 MG
500 CAPSULE ORAL EVERY 24 HOURS
Refills: 0 | Status: DISCONTINUED | OUTPATIENT
Start: 2025-04-14 | End: 2025-04-14

## 2025-04-13 RX ORDER — IPRATROPIUM BROMIDE AND ALBUTEROL SULFATE .5; 2.5 MG/3ML; MG/3ML
3 SOLUTION RESPIRATORY (INHALATION) EVERY 4 HOURS
Refills: 0 | Status: DISCONTINUED | OUTPATIENT
Start: 2025-04-13 | End: 2025-04-15

## 2025-04-13 RX ORDER — MELATONIN 5 MG
5 TABLET ORAL AT BEDTIME
Refills: 0 | Status: DISCONTINUED | OUTPATIENT
Start: 2025-04-13 | End: 2025-04-15

## 2025-04-13 RX ORDER — AZITHROMYCIN 250 MG
500 CAPSULE ORAL ONCE
Refills: 0 | Status: COMPLETED | OUTPATIENT
Start: 2025-04-13 | End: 2025-04-13

## 2025-04-13 RX ADMIN — BACLOFEN 5 MILLIGRAM(S): 10 INJECTION INTRATHECAL at 21:31

## 2025-04-13 RX ADMIN — Medication 1000 MILLILITER(S): at 13:56

## 2025-04-13 RX ADMIN — AMITRIPTYLINE HYDROCHLORIDE 25 MILLIGRAM(S): 25 TABLET, FILM COATED ORAL at 23:10

## 2025-04-13 RX ADMIN — IPRATROPIUM BROMIDE AND ALBUTEROL SULFATE 3 MILLILITER(S): .5; 2.5 SOLUTION RESPIRATORY (INHALATION) at 13:59

## 2025-04-13 RX ADMIN — IPRATROPIUM BROMIDE AND ALBUTEROL SULFATE 3 MILLILITER(S): .5; 2.5 SOLUTION RESPIRATORY (INHALATION) at 13:45

## 2025-04-13 RX ADMIN — Medication 255 MILLIGRAM(S): at 21:30

## 2025-04-13 RX ADMIN — Medication 5 MILLIGRAM(S): at 21:32

## 2025-04-13 RX ADMIN — ENOXAPARIN SODIUM 40 MILLIGRAM(S): 100 INJECTION SUBCUTANEOUS at 19:16

## 2025-04-13 RX ADMIN — CEFTRIAXONE 100 MILLIGRAM(S): 500 INJECTION, POWDER, FOR SOLUTION INTRAMUSCULAR; INTRAVENOUS at 19:16

## 2025-04-13 RX ADMIN — METHYLPREDNISOLONE ACETATE 125 MILLIGRAM(S): 80 INJECTION, SUSPENSION INTRA-ARTICULAR; INTRALESIONAL; INTRAMUSCULAR; SOFT TISSUE at 13:56

## 2025-04-13 RX ADMIN — IPRATROPIUM BROMIDE AND ALBUTEROL SULFATE 3 MILLILITER(S): .5; 2.5 SOLUTION RESPIRATORY (INHALATION) at 15:20

## 2025-04-13 RX ADMIN — Medication 2.5 MILLIGRAM(S): at 14:43

## 2025-04-13 RX ADMIN — Medication 150 GRAM(S): at 14:43

## 2025-04-13 RX ADMIN — Medication 2 TABLET(S): at 21:32

## 2025-04-13 RX ADMIN — Medication 40 MILLIGRAM(S): at 19:16

## 2025-04-13 NOTE — CONSULT NOTE ADULT - CRITICAL CARE ATTENDING COMMENT
Acute respiratory failure in setting of viral pneumonia in setting of neuromuscular disease. Treat with steroids and nebs, CAP antibiotics. Has baseline mental status on NIV with appropriate tidal volumes.  Low threshold for mechanical ventilation. NPO for now. Admit to MICU.
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
## 2025-04-13 NOTE — H&P ADULT - ATTENDING COMMENTS
Acute respiratory failure in setting of viral pneumonia in setting of neuromuscular disease. Treat with steroids and nebs, CAP antibiotics. Has baseline mental status on NIV with appropriate tidal volumes.  Low threshold for mechanical ventilation. NPO for now. Rest as per above.

## 2025-04-13 NOTE — PROCEDURE NOTE - NSICDXPROCEDURE_GEN_ALL_CORE_FT
PROCEDURES:  Arterial puncture for blood collection 13-Apr-2025 19:38:41  [place]  US guided vascular access 13-Apr-2025 19:38:50  [place]

## 2025-04-13 NOTE — ED PROVIDER NOTE - CARE PLAN
Principal Discharge DX:	RAD (reactive airway disease) with wheezing  Secondary Diagnosis:	Hyponatremia   1

## 2025-04-13 NOTE — CONSULT NOTE ADULT - ASSESSMENT
54yo F w/ pmhx of Pneumonia requiring intubation in 2023 s/p trach with reversal in 2024, MI 2/2 birth control induced hypercoagulability in 2001, quadriplegia secondary to neck fracture, bladder resection s/p urostomy, chronic pain, and constipation who presents with AHRF 2/2 PNA with concern for possible Asthma exacerbation    Neuro  #Quadriplegia     Cardiovascular  #Hx of MI without stents  Patient endorses hx of MI in 2001 due to hypercoagulable state while on birth control. Denies any interventions or stent placement  Plan:  - obtain further collateral when off Bipap    Pulm  #AHRF  #Asthma Exacerbation  #PNA  #Enterovirus  Patient presents with AHRF requiring increasing oxygen support, eventually placed on Bipap. Patient denies official diagnosis of asthma but endorses wheeze on arrival, home med of albuterol. Found to have L sided consolidation on POCUS, also found to be + Enterovirus. ABG done in ED was after patient was on Bipap for only a few minutes. Shows normal pH with pCO2 of 37 indicating patient was likely tiring out and heading towards worsening respiratory status. Recieved 125mg Solumedrol in ED, duonebs, and magnesium.  Plan:  - c/w Bipap, serial ABG's  - c/w standing duonebs q4h  - racemic epinephrine as needed  - start Solumedrol 40mg qd tomorrow  - antibiotics as below    GI  #Constipation  C/w bowel regimen when off Bipap    #PPI on steroids      #bladder resection s/p urostomy  UA negative on arrival, urostomy bag with yellow urine without sediment    Renal  VIKTOR    ID  #PNA  #Enterovirus  Patient presents with AHRF 2/2 PNA. L sided consolidation noted on POCUS. R side normal. Possibly viral PNA vs superimposed bacterial infection (CAP). Was prescribed Amoxicillin 2 days ago without improvement in symptoms  Plan:  - c/w CTX and Azithro  - f/u urine strept and legionella  - f/u AM procal    F: s/p 1L  E: replete as needed  N: NPO until off bipap and stable  DVT: lovenox  Dispo: MICU    Plan discussed with Dr. Sevilla           54yo F w/ pmhx of Pneumonia requiring intubation in 2023 s/p trach with reversal in 2024, MI 2/2 birth control induced hypercoagulability in 2001, quadriplegia secondary to neck fracture, bladder resection s/p urostomy, chronic pain, and constipation who presents with AHRF 2/2 PNA with concern for possible Asthma exacerbation    Neuro  #Quadriplegia   Holding home pain meds until respiratory status improves, can c/w Tylenol as needed    Cardiovascular  #Hx of MI without stents  Patient endorses hx of MI in 2001 due to hypercoagulable state while on birth control. Denies any interventions or stent placement  Plan:  - obtain further collateral when off Bipap    Pulm  #AHRF  #Asthma Exacerbation  #PNA  #Enterovirus  Patient presents with AHRF requiring increasing oxygen support, eventually placed on Bipap. Patient denies official diagnosis of asthma but endorses wheeze on arrival, home med of albuterol. Found to have L sided consolidation on POCUS, also found to be + Enterovirus. ABG done in ED was after patient was on Bipap for only a few minutes. Shows normal pH with pCO2 of 37 indicating patient was likely tiring out and heading towards worsening respiratory status. Recieved 125mg Solumedrol in ED, duonebs, and magnesium.  Plan:  - c/w Bipap, serial ABG's  - c/w standing duonebs q4h  - racemic epinephrine as needed  - start Solumedrol 40mg qd tomorrow  - antibiotics as below    GI  #Constipation  C/w bowel regimen when off Bipap    #PPI on steroids      #bladder resection s/p urostomy  UA negative on arrival, urostomy bag with yellow urine without sediment    Renal  VIKTOR    ID  #PNA  #Enterovirus  Patient presents with AHRF 2/2 PNA. L sided consolidation noted on POCUS. R side normal. Possibly viral PNA vs superimposed bacterial infection (CAP). Was prescribed Amoxicillin 2 days ago without improvement in symptoms  Plan:  - c/w CTX and Azithro  - f/u urine strept and legionella  - f/u AM procal    F: s/p 1L  E: replete as needed  N: NPO until off bipap and stable  DVT: lovenox  Dispo: MICU    Plan discussed with Dr. Sevilla           56yo F w/ pmhx of Pneumonia requiring intubation in 2023 s/p trach with reversal in 2024, MI 2/2 birth control induced hypercoagulability in 2001, quadriplegia secondary to neck fracture, bladder resection s/p urostomy, chronic pain, and constipation who presents with AHRF 2/2 PNA with concern for possible Asthma exacerbation    Neuro  #Quadriplegia   Holding home pain meds until respiratory status improves, can c/w Tylenol as needed    Cardiovascular  #Hx of MI without stents  Patient endorses hx of MI in 2001 due to hypercoagulable state while on birth control. Denies any interventions or stent placement  Plan:  - obtain further collateral when off Bipap    Pulm  #AHRF  #PNA  #Enterovirus  Patient presents with AHRF requiring increasing oxygen support, eventually placed on Bipap. Patient denies official diagnosis of asthma but endorses wheeze on arrival, home med of albuterol. Found to have L sided consolidation on POCUS, also found to be + Enterovirus. ABG done in ED was after patient was on Bipap for only a few minutes. Shows normal pH with pCO2 of 37 indicating patient was likely tiring out and heading towards worsening respiratory status. Recieved 125mg Solumedrol in ED, duonebs, and magnesium.  Plan:  - c/w Bipap, serial ABG's  - c/w standing duonebs q4h  - start Solumedrol 40mg qd tomorrow  - antibiotics as below    GI  #Constipation  C/w bowel regimen when off Bipap    #PPI on steroids      #bladder resection s/p urostomy  UA negative on arrival, urostomy bag with yellow urine without sediment    Renal  VIKTOR    ID  #PNA  #Enterovirus  Patient presents with AHRF 2/2 PNA. L sided consolidation noted on POCUS. R side normal. Possibly viral PNA vs superimposed bacterial infection (CAP). Was prescribed Amoxicillin 2 days ago without improvement in symptoms  Plan:  - c/w CTX and Azithro  - f/u urine strept and legionella  - f/u AM procal    F: s/p 1L  E: replete as needed  N: NPO until off bipap and stable  DVT: lovenox  Dispo: MICU    Plan discussed with Dr. Sevilla

## 2025-04-13 NOTE — ED ADULT NURSE NOTE - OBJECTIVE STATEMENT
54 y/o Female pmhx quadriplegic wheelchair bound presents to the ED c/o SOB x today. Denies CP, HA, F/C, N/V/D, weakness, dizziness, and any other complaints at this time. On exam A&Ox4, RA. Speaking in clear coherent sentences, respirations are spontaneous and unlabored. No obvious signs of injury. UG for hypotension in triage. Pt denies lightheadedness/dizziness.

## 2025-04-13 NOTE — H&P ADULT - NSHPLABSRESULTS_GEN_ALL_CORE
LABS:                        10.7   4.56  )-----------( 163      ( 2025 13:21 )             30.1     04-13    124[L]  |  89[L]  |  8   ----------------------------<  94  4.2   |  23  |  0.25[L]    Ca    9.1      2025 13:21          Urinalysis Basic - ( 2025 17:21 )    Color: Dark Yellow / Appearance: Clear / S.010 / pH: x  Gluc: x / Ketone: 15 mg/dL  / Bili: Negative / Urobili: 0.2 mg/dL   Blood: x / Protein: Negative mg/dL / Nitrite: Negative   Leuk Esterase: Negative / RBC: x / WBC x   Sq Epi: x / Non Sq Epi: x / Bacteria: x      CAPILLARY BLOOD GLUCOSE          SARS-CoV-2: NotDetec (2025 13:21)      RADIOLOGY & ADDITIONAL TESTS: Reviewed.
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
## 2025-04-13 NOTE — ED ADULT NURSE REASSESSMENT NOTE - NS ED NURSE REASSESS COMMENT FT1
Pt c/o SOB, 89% on RA. Placed on 2L NC SpO2 increased to 90%, pt then increased to 4L NC with improvement of SpO2 to 93%. MD Kwon aware. Continuous cardiac/pulse oximetry monitoring remains in place.

## 2025-04-13 NOTE — ED ADULT TRIAGE NOTE - CHIEF COMPLAINT QUOTE
Patient PMH quadriplegia to the ED c/o sob x 1 week. Hypotensive in triage, "I usually run low". Skin color appropriate, AAOX4, NAD.
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
## 2025-04-13 NOTE — H&P ADULT - NSHPPHYSICALEXAM_GEN_ALL_CORE
12 Point ROS Negative unless noted otherwise above.  -------------------------------------------------------------------------------  VITAL SIGNS:  Vital Signs Last 24 Hrs  T(C): 36.4 (13 Apr 2025 12:51), Max: 36.4 (13 Apr 2025 12:51)  T(F): 97.6 (13 Apr 2025 12:51), Max: 97.6 (13 Apr 2025 12:51)  HR: 88 (13 Apr 2025 17:17) (60 - 93)  BP: 129/77 (13 Apr 2025 17:17) (81/52 - 129/77)  BP(mean): --  RR: 20 (13 Apr 2025 17:17) (20 - 22)  SpO2: 99% (13 Apr 2025 17:17) (90% - 99%)    Parameters below as of 13 Apr 2025 17:17  Patient On (Oxygen Delivery Method): BiPAP/CPAP      I&O's Summary      PHYSICAL EXAM:    General: appears to be in mild distress  HEENT: NC/AT; EOMI, PERRLA, anicteric sclera; moist mucosal membranes  Cardiovascular: RRR, +S1/S2; NO M/R/G  Respiratory: BL rhonchi anteriorly  Gastrointestinal: soft, NT/ND; +BSx4  : urostomy bag with yellow urine  Extremities: WWP; no edema or cyanosis  Vascular: 2+ radial, DP/PT pulses B/L  Neurological: AAOx3; no focal deficits

## 2025-04-13 NOTE — ED PROVIDER NOTE - PHYSICAL EXAMINATION
General:  No distress, but audible wheeze   HEENT:  No conjunctival injection, neck supple, no congestion   Chest:  Non-tender, no crepitance  Lungs:  Moderate bilateral wheezing   Heart:  s1s2 normal, no murmur  Abdomen:  soft, non-tender, non-distended  :  Deferred  Rectal:  Deferred  Extremities: No edema, normal perfusion, no joint swelling

## 2025-04-14 LAB
ALBUMIN SERPL ELPH-MCNC: 4.2 G/DL — SIGNIFICANT CHANGE UP (ref 3.3–5)
ALP SERPL-CCNC: 102 U/L — SIGNIFICANT CHANGE UP (ref 40–120)
ALT FLD-CCNC: 19 U/L — SIGNIFICANT CHANGE UP (ref 10–45)
ANION GAP SERPL CALC-SCNC: 14 MMOL/L — SIGNIFICANT CHANGE UP (ref 5–17)
AST SERPL-CCNC: 26 U/L — SIGNIFICANT CHANGE UP (ref 10–40)
BASOPHILS # BLD AUTO: 0.01 K/UL — SIGNIFICANT CHANGE UP (ref 0–0.2)
BASOPHILS NFR BLD AUTO: 0.2 % — SIGNIFICANT CHANGE UP (ref 0–2)
BILIRUB SERPL-MCNC: 0.3 MG/DL — SIGNIFICANT CHANGE UP (ref 0.2–1.2)
BLD GP AB SCN SERPL QL: NEGATIVE — SIGNIFICANT CHANGE UP
BUN SERPL-MCNC: 6 MG/DL — LOW (ref 7–23)
CALCIUM SERPL-MCNC: 9.1 MG/DL — SIGNIFICANT CHANGE UP (ref 8.4–10.5)
CHLORIDE SERPL-SCNC: 100 MMOL/L — SIGNIFICANT CHANGE UP (ref 96–108)
CO2 SERPL-SCNC: 22 MMOL/L — SIGNIFICANT CHANGE UP (ref 22–31)
CREAT SERPL-MCNC: 0.16 MG/DL — LOW (ref 0.5–1.3)
EGFR: 147 ML/MIN/1.73M2 — SIGNIFICANT CHANGE UP
EGFR: 147 ML/MIN/1.73M2 — SIGNIFICANT CHANGE UP
EOSINOPHIL # BLD AUTO: 0 K/UL — SIGNIFICANT CHANGE UP (ref 0–0.5)
EOSINOPHIL NFR BLD AUTO: 0 % — SIGNIFICANT CHANGE UP (ref 0–6)
FERRITIN SERPL-MCNC: 173 NG/ML — SIGNIFICANT CHANGE UP (ref 13–330)
GLUCOSE SERPL-MCNC: 93 MG/DL — SIGNIFICANT CHANGE UP (ref 70–99)
HCT VFR BLD CALC: 34.6 % — SIGNIFICANT CHANGE UP (ref 34.5–45)
HGB BLD-MCNC: 11.9 G/DL — SIGNIFICANT CHANGE UP (ref 11.5–15.5)
IMM GRANULOCYTES NFR BLD AUTO: 0.6 % — SIGNIFICANT CHANGE UP (ref 0–0.9)
IRON SATN MFR SERPL: 45 UG/DL — SIGNIFICANT CHANGE UP (ref 30–160)
IRON SATN MFR SERPL: SIGNIFICANT CHANGE UP % (ref 14–50)
LEGIONELLA AG UR QL: NEGATIVE — SIGNIFICANT CHANGE UP
LYMPHOCYTES # BLD AUTO: 0.24 K/UL — LOW (ref 1–3.3)
LYMPHOCYTES # BLD AUTO: 4.9 % — LOW (ref 13–44)
MAGNESIUM SERPL-MCNC: 2.1 MG/DL — SIGNIFICANT CHANGE UP (ref 1.6–2.6)
MCHC RBC-ENTMCNC: 32.4 PG — SIGNIFICANT CHANGE UP (ref 27–34)
MCHC RBC-ENTMCNC: 34.4 G/DL — SIGNIFICANT CHANGE UP (ref 32–36)
MCV RBC AUTO: 94.3 FL — SIGNIFICANT CHANGE UP (ref 80–100)
MONOCYTES # BLD AUTO: 0.47 K/UL — SIGNIFICANT CHANGE UP (ref 0–0.9)
MONOCYTES NFR BLD AUTO: 9.5 % — SIGNIFICANT CHANGE UP (ref 2–14)
NEUTROPHILS # BLD AUTO: 4.19 K/UL — SIGNIFICANT CHANGE UP (ref 1.8–7.4)
NEUTROPHILS NFR BLD AUTO: 84.8 % — HIGH (ref 43–77)
NRBC BLD AUTO-RTO: 0 /100 WBCS — SIGNIFICANT CHANGE UP (ref 0–0)
PHOSPHATE SERPL-MCNC: 3.7 MG/DL — SIGNIFICANT CHANGE UP (ref 2.5–4.5)
PLATELET # BLD AUTO: 222 K/UL — SIGNIFICANT CHANGE UP (ref 150–400)
POTASSIUM SERPL-MCNC: 3.8 MMOL/L — SIGNIFICANT CHANGE UP (ref 3.5–5.3)
POTASSIUM SERPL-SCNC: 3.8 MMOL/L — SIGNIFICANT CHANGE UP (ref 3.5–5.3)
PROT SERPL-MCNC: 6.9 G/DL — SIGNIFICANT CHANGE UP (ref 6–8.3)
RBC # BLD: 3.67 M/UL — LOW (ref 3.8–5.2)
RBC # FLD: 15.4 % — HIGH (ref 10.3–14.5)
RH IG SCN BLD-IMP: POSITIVE — SIGNIFICANT CHANGE UP
S PNEUM AG UR QL: NEGATIVE — SIGNIFICANT CHANGE UP
SODIUM SERPL-SCNC: 136 MMOL/L — SIGNIFICANT CHANGE UP (ref 135–145)
TIBC SERPL-MCNC: SIGNIFICANT CHANGE UP UG/DL (ref 220–430)
TSH SERPL-MCNC: 0.57 UIU/ML — SIGNIFICANT CHANGE UP (ref 0.27–4.2)
UIBC SERPL-MCNC: SIGNIFICANT CHANGE UP UG/DL (ref 110–370)
WBC # BLD: 4.94 K/UL — SIGNIFICANT CHANGE UP (ref 3.8–10.5)
WBC # FLD AUTO: 4.94 K/UL — SIGNIFICANT CHANGE UP (ref 3.8–10.5)

## 2025-04-14 PROCEDURE — 99233 SBSQ HOSP IP/OBS HIGH 50: CPT | Mod: GC

## 2025-04-14 RX ORDER — BACLOFEN 10 MG/20ML
20 INJECTION INTRATHECAL EVERY 8 HOURS
Refills: 0 | Status: DISCONTINUED | OUTPATIENT
Start: 2025-04-14 | End: 2025-04-15

## 2025-04-14 RX ORDER — GABAPENTIN 400 MG/1
300 CAPSULE ORAL EVERY 8 HOURS
Refills: 0 | Status: DISCONTINUED | OUTPATIENT
Start: 2025-04-14 | End: 2025-04-15

## 2025-04-14 RX ORDER — DIAZEPAM 2 MG/1
2 TABLET ORAL ONCE
Refills: 0 | Status: DISCONTINUED | OUTPATIENT
Start: 2025-04-14 | End: 2025-04-14

## 2025-04-14 RX ORDER — AZITHROMYCIN 250 MG
500 CAPSULE ORAL EVERY 24 HOURS
Refills: 0 | Status: DISCONTINUED | OUTPATIENT
Start: 2025-04-14 | End: 2025-04-14

## 2025-04-14 RX ORDER — PREDNISONE 20 MG/1
40 TABLET ORAL EVERY 24 HOURS
Refills: 0 | Status: DISCONTINUED | OUTPATIENT
Start: 2025-04-15 | End: 2025-04-15

## 2025-04-14 RX ORDER — HYDROXYZINE HYDROCHLORIDE 25 MG/1
25 TABLET, FILM COATED ORAL ONCE
Refills: 0 | Status: COMPLETED | OUTPATIENT
Start: 2025-04-14 | End: 2025-04-14

## 2025-04-14 RX ORDER — POLYETHYLENE GLYCOL 3350 17 G/17G
17 POWDER, FOR SOLUTION ORAL EVERY 12 HOURS
Refills: 0 | Status: DISCONTINUED | OUTPATIENT
Start: 2025-04-14 | End: 2025-04-15

## 2025-04-14 RX ORDER — AZITHROMYCIN 250 MG
500 CAPSULE ORAL EVERY 24 HOURS
Refills: 0 | Status: DISCONTINUED | OUTPATIENT
Start: 2025-04-14 | End: 2025-04-15

## 2025-04-14 RX ORDER — BACLOFEN 10 MG/20ML
15 INJECTION INTRATHECAL ONCE
Refills: 0 | Status: COMPLETED | OUTPATIENT
Start: 2025-04-14 | End: 2025-04-14

## 2025-04-14 RX ORDER — SALINE 7; 19 G/118ML; G/118ML
1 ENEMA RECTAL ONCE
Refills: 0 | Status: DISCONTINUED | OUTPATIENT
Start: 2025-04-14 | End: 2025-04-14

## 2025-04-14 RX ORDER — SODIUM CHLORIDE 0.65 %
1 AEROSOL, SPRAY (ML) NASAL EVERY 8 HOURS
Refills: 0 | Status: DISCONTINUED | OUTPATIENT
Start: 2025-04-14 | End: 2025-04-15

## 2025-04-14 RX ORDER — MAGNESIUM HYDROXIDE 400 MG/5ML
30 SUSPENSION ORAL ONCE
Refills: 0 | Status: COMPLETED | OUTPATIENT
Start: 2025-04-14 | End: 2025-04-14

## 2025-04-14 RX ADMIN — HYDROXYZINE HYDROCHLORIDE 25 MILLIGRAM(S): 25 TABLET, FILM COATED ORAL at 23:15

## 2025-04-14 RX ADMIN — Medication 1 APPLICATION(S): at 05:21

## 2025-04-14 RX ADMIN — BACLOFEN 5 MILLIGRAM(S): 10 INJECTION INTRATHECAL at 13:37

## 2025-04-14 RX ADMIN — GABAPENTIN 400 MILLIGRAM(S): 400 CAPSULE ORAL at 13:37

## 2025-04-14 RX ADMIN — IPRATROPIUM BROMIDE AND ALBUTEROL SULFATE 3 MILLILITER(S): .5; 2.5 SOLUTION RESPIRATORY (INHALATION) at 22:06

## 2025-04-14 RX ADMIN — DIAZEPAM 2 MILLIGRAM(S): 2 TABLET ORAL at 00:58

## 2025-04-14 RX ADMIN — MAGNESIUM HYDROXIDE 30 MILLILITER(S): 400 SUSPENSION ORAL at 14:31

## 2025-04-14 RX ADMIN — IPRATROPIUM BROMIDE AND ALBUTEROL SULFATE 3 MILLILITER(S): .5; 2.5 SOLUTION RESPIRATORY (INHALATION) at 15:12

## 2025-04-14 RX ADMIN — Medication 40 MILLIGRAM(S): at 12:37

## 2025-04-14 RX ADMIN — POLYETHYLENE GLYCOL 3350 17 GRAM(S): 17 POWDER, FOR SOLUTION ORAL at 03:31

## 2025-04-14 RX ADMIN — BACLOFEN 5 MILLIGRAM(S): 10 INJECTION INTRATHECAL at 05:23

## 2025-04-14 RX ADMIN — AMITRIPTYLINE HYDROCHLORIDE 25 MILLIGRAM(S): 25 TABLET, FILM COATED ORAL at 22:06

## 2025-04-14 RX ADMIN — IPRATROPIUM BROMIDE AND ALBUTEROL SULFATE 3 MILLILITER(S): .5; 2.5 SOLUTION RESPIRATORY (INHALATION) at 10:37

## 2025-04-14 RX ADMIN — GABAPENTIN 300 MILLIGRAM(S): 400 CAPSULE ORAL at 22:08

## 2025-04-14 RX ADMIN — Medication 2 TABLET(S): at 22:06

## 2025-04-14 RX ADMIN — IPRATROPIUM BROMIDE AND ALBUTEROL SULFATE 3 MILLILITER(S): .5; 2.5 SOLUTION RESPIRATORY (INHALATION) at 06:17

## 2025-04-14 RX ADMIN — IPRATROPIUM BROMIDE AND ALBUTEROL SULFATE 3 MILLILITER(S): .5; 2.5 SOLUTION RESPIRATORY (INHALATION) at 01:04

## 2025-04-14 RX ADMIN — DIAZEPAM 2 MILLIGRAM(S): 2 TABLET ORAL at 03:31

## 2025-04-14 RX ADMIN — Medication 500 MILLIGRAM(S): at 12:37

## 2025-04-14 RX ADMIN — POLYETHYLENE GLYCOL 3350 17 GRAM(S): 17 POWDER, FOR SOLUTION ORAL at 16:21

## 2025-04-14 RX ADMIN — CEFTRIAXONE 100 MILLIGRAM(S): 500 INJECTION, POWDER, FOR SOLUTION INTRAMUSCULAR; INTRAVENOUS at 19:40

## 2025-04-14 RX ADMIN — BACLOFEN 15 MILLIGRAM(S): 10 INJECTION INTRATHECAL at 18:23

## 2025-04-14 RX ADMIN — GABAPENTIN 400 MILLIGRAM(S): 400 CAPSULE ORAL at 03:31

## 2025-04-14 RX ADMIN — Medication 1 TABLET(S): at 12:37

## 2025-04-14 RX ADMIN — BACLOFEN 20 MILLIGRAM(S): 10 INJECTION INTRATHECAL at 22:06

## 2025-04-14 RX ADMIN — IPRATROPIUM BROMIDE AND ALBUTEROL SULFATE 3 MILLILITER(S): .5; 2.5 SOLUTION RESPIRATORY (INHALATION) at 18:23

## 2025-04-14 RX ADMIN — Medication 100 GRAM(S): at 00:00

## 2025-04-14 RX ADMIN — METHYLPREDNISOLONE ACETATE 40 MILLIGRAM(S): 80 INJECTION, SUSPENSION INTRA-ARTICULAR; INTRALESIONAL; INTRAMUSCULAR; SOFT TISSUE at 10:18

## 2025-04-14 NOTE — PROGRESS NOTE ADULT - SUBJECTIVE AND OBJECTIVE BOX
Patient is a 55y old  Female who presents with a chief complaint of copd-e (14 Apr 2025 06:40)      HOSPITAL COURSE:   Patient is 54yo F w/ pmhx of Pneumonia requiring intubation in 2023 s/p trach with reversal in 2024, MI 2/2 birth control induced hypercoagulability in 2001, quadriplegia secondary to neck fracture, bladder resection s/p urostomy, chronic pain, and constipation with daily marijuana use smoked who presents with complaints of dyspnea, cough productive of white sputum, fevers/chills for several days. Patient states she first experiences symptoms about 2-3 days ago and states it felt like her previous episodes of PNA. She was prescribed Amoxicillin 2 days ago with almost no relief of symptoms. She endorses a cough productive of white sputum, denies hemoptysis, and some associated wheezing, denies any hx of asthma or COPD. Patient states symptoms continued to worsen this morning prompting her to come to the ED.     Patient states she followed with Pulm outpatient once she got her trach a couple of years ago and continued to follow until it was reversed. Denies ever being told of any diagnosis of Asthma or COPD, does not recall having any PFT's done. She notes a distant hx of MI due to being on birth control but never had stents placed, no hx of HF either.     Patient originally admitted to medicine, reported to be doing well on nasal cannula. Once admission team saw the patient in the ED, patient was noted to be in respiratory distress, tripoding having difficulty completing sentences. Patient was placed on non rebreather and given duonebs, slight improvement in symptoms. She was then placed on Bipap for increased work of breathing with further improvement. Bedside POCUS done showing A lines with L sided consolidation. Heart function grossly normal.     SUBJECTIVE: ***    ROS: otherwise negative      T(C): 35.6 (04-14-25 @ 14:43), Max: 36.7 (04-13-25 @ 18:40)  HR: 66 (04-14-25 @ 15:00) (64 - 99)  BP: 147/66 (04-14-25 @ 15:00) (105/65 - 182/88)  RR: 22 (04-14-25 @ 15:00) (12 - 35)  SpO2: 93% (04-14-25 @ 15:00) (92% - 99%)  Wt(kg): --Vital Signs Last 24 Hrs  T(C): 35.6 (14 Apr 2025 14:43), Max: 36.7 (13 Apr 2025 18:40)  T(F): 96.1 (14 Apr 2025 14:43), Max: 98.1 (14 Apr 2025 05:33)  HR: 66 (14 Apr 2025 15:00) (64 - 99)  BP: 147/66 (14 Apr 2025 15:00) (105/65 - 182/88)  BP(mean): 95 (14 Apr 2025 15:00) (80 - 133)  RR: 22 (14 Apr 2025 15:00) (12 - 35)  SpO2: 93% (14 Apr 2025 15:00) (92% - 99%)    Parameters below as of 14 Apr 2025 15:00  Patient On (Oxygen Delivery Method): room air        PHYSICAL EXAM:  General: appears to be in mild distress  HEENT: NC/AT; EOMI, PERRLA, anicteric sclera; moist mucosal membranes  Cardiovascular: RRR, +S1/S2; NO M/R/G  Respiratory: BL rhonchi anteriorly  Gastrointestinal: soft, NT/ND; +BSx4  : urostomy bag with yellow urine  Extremities: WWP; no edema or cyanosis  Vascular: 2+ radial, DP/PT pulses B/L  Neurological: AAOx3; no focal deficits    LABS:                        11.9   4.94  )-----------( 222      ( 14 Apr 2025 05:30 )             34.6     04-14    136  |  100  |  6[L]  ----------------------------<  93  3.8   |  22  |  0.16[L]    Ca    9.1      14 Apr 2025 05:30  Phos  3.7     04-14  Mg     2.1     04-14    TPro  6.9  /  Alb  4.2  /  TBili  0.3  /  DBili  x   /  AST  26  /  ALT  19  /  AlkPhos  102  04-14    Iron 45, TIBC see note, %Sat see note, Ferritin 173/ 04-14-25 @ 05:30    PT/INR - ( 13 Apr 2025 18:32 )   PT: 10.3 sec;   INR: 0.88          PTT - ( 13 Apr 2025 18:32 )  PTT:29.3 sec  Urinalysis Basic - ( 14 Apr 2025 05:30 )    Color: x / Appearance: x / SG: x / pH: x  Gluc: 93 mg/dL / Ketone: x  / Bili: x / Urobili: x   Blood: x / Protein: x / Nitrite: x   Leuk Esterase: x / RBC: x / WBC x   Sq Epi: x / Non Sq Epi: x / Bacteria: x      CAPILLARY BLOOD GLUCOSE          ABG - ( 13 Apr 2025 20:07 )  pH, Arterial: 7.42  pH, Blood: x     /  pCO2: 38    /  pO2: 124   / HCO3: 25    / Base Excess: 0.3   /  SaO2: 98.9              Urinalysis Basic - ( 14 Apr 2025 05:30 )    Color: x / Appearance: x / SG: x / pH: x  Gluc: 93 mg/dL / Ketone: x  / Bili: x / Urobili: x   Blood: x / Protein: x / Nitrite: x   Leuk Esterase: x / RBC: x / WBC x   Sq Epi: x / Non Sq Epi: x / Bacteria: x        MEDICATIONS  (STANDING):  albuterol/ipratropium for Nebulization 3 milliLiter(s) Nebulizer every 4 hours  amitriptyline 25 milliGRAM(s) Oral every 24 hours  ascorbic acid 500 milliGRAM(s) Oral daily  azithromycin  IVPB      azithromycin  IVPB 500 milliGRAM(s) IV Intermittent every 24 hours  baclofen 20 milliGRAM(s) Oral every 8 hours  baclofen 15 milliGRAM(s) Oral once  cefTRIAXone   IVPB 2000 milliGRAM(s) IV Intermittent every 24 hours  chlorhexidine 2% Cloths 1 Application(s) Topical <User Schedule>  enoxaparin Injectable 40 milliGRAM(s) SubCutaneous every 24 hours  gabapentin 300 milliGRAM(s) Oral every 8 hours  melatonin 5 milliGRAM(s) Oral at bedtime  multivitamin 1 Tablet(s) Oral daily  polyethylene glycol 3350 17 Gram(s) Oral every 12 hours  senna 2 Tablet(s) Oral at bedtime    MEDICATIONS  (PRN):  bisacodyl Suppository 10 milliGRAM(s) Rectal at bedtime PRN Constipation  cyclobenzaprine 5 milliGRAM(s) Oral three times a day PRN Muscle Spasm  racepinephrine  2.25% Inhalation 0.5 milliLiter(s) Inhalation once PRN Worsening wheezing/dyspnea  sodium chloride 0.65% Nasal 1 Spray(s) Both Nostrils every 8 hours PRN Nasal Congestion      RADIOLOGY & ADDITIONAL TESTS: Reviewed   Patient is a 55y old  Female who presents with a chief complaint of copd-e (14 Apr 2025 06:40)      HOSPITAL COURSE:   Patient is 54yo F w/ pmhx of Pneumonia requiring intubation in 2023 s/p trach with reversal in 2024, MI 2/2 birth control induced hypercoagulability in 2001, quadriplegia secondary to neck fracture, bladder resection s/p urostomy, chronic pain, and constipation with daily marijuana use smoked who presents with complaints of dyspnea, cough productive of white sputum, fevers/chills for several days. Patient states she first experiences symptoms about 2-3 days ago and states it felt like her previous episodes of PNA. She was prescribed Amoxicillin 2 days ago with almost no relief of symptoms. She endorses a cough productive of white sputum, denies hemoptysis, and some associated wheezing, denies any hx of asthma or COPD. Patient states symptoms continued to worsen this morning prompting her to come to the ED.     Patient states she followed with Pulm outpatient once she got her trach a couple of years ago and continued to follow until it was reversed. Denies ever being told of any diagnosis of Asthma or COPD, does not recall having any PFT's done. She notes a distant hx of MI due to being on birth control but never had stents placed, no hx of HF either.     Patient originally admitted to medicine, reported to be doing well on nasal cannula. Once admission team saw the patient in the ED, patient was noted to be in respiratory distress, tripoding having difficulty completing sentences. Patient was placed on non rebreather and given duonebs, slight improvement in symptoms. She was then placed on Bipap for increased work of breathing with further improvement. Bedside POCUS done showing A lines with L sided consolidation. Heart function grossly normal.     SUBJECTIVE: Reports feeling ~75% better with some residual shortness of breath. Denies fevers, headaches, nausea, vomiting, diarrhea. Feels that her shortness of breath was related to chronic spasms over abdominal region/ongoing constipation and would like an enema.     ROS: otherwise negative      T(C): 35.6 (04-14-25 @ 14:43), Max: 36.7 (04-13-25 @ 18:40)  HR: 66 (04-14-25 @ 15:00) (64 - 99)  BP: 147/66 (04-14-25 @ 15:00) (105/65 - 182/88)  RR: 22 (04-14-25 @ 15:00) (12 - 35)  SpO2: 93% (04-14-25 @ 15:00) (92% - 99%)  Wt(kg): --Vital Signs Last 24 Hrs  T(C): 35.6 (14 Apr 2025 14:43), Max: 36.7 (13 Apr 2025 18:40)  T(F): 96.1 (14 Apr 2025 14:43), Max: 98.1 (14 Apr 2025 05:33)  HR: 66 (14 Apr 2025 15:00) (64 - 99)  BP: 147/66 (14 Apr 2025 15:00) (105/65 - 182/88)  BP(mean): 95 (14 Apr 2025 15:00) (80 - 133)  RR: 22 (14 Apr 2025 15:00) (12 - 35)  SpO2: 93% (14 Apr 2025 15:00) (92% - 99%)    Parameters below as of 14 Apr 2025 15:00  Patient On (Oxygen Delivery Method): room air        PHYSICAL EXAM:  General: appears to be in mild distress  HEENT: NC/AT; EOMI, PERRLA, anicteric sclera; moist mucosal membranes. scar from prior trach  Cardiovascular: RRR, +S1/S2; NO M/R/G  Respiratory: BL expiratory wheezing anteriorly  Gastrointestinal: soft, NT/ND; +BSx4  : urostomy bag with yellow urine  Extremities: WWP; no edema or cyanosis  Vascular: 2+ radial, DP/PT pulses B/L  Neurological: AAOx3; no focal deficits    LABS:                        11.9   4.94  )-----------( 222      ( 14 Apr 2025 05:30 )             34.6     04-14    136  |  100  |  6[L]  ----------------------------<  93  3.8   |  22  |  0.16[L]    Ca    9.1      14 Apr 2025 05:30  Phos  3.7     04-14  Mg     2.1     04-14    TPro  6.9  /  Alb  4.2  /  TBili  0.3  /  DBili  x   /  AST  26  /  ALT  19  /  AlkPhos  102  04-14    Iron 45, TIBC see note, %Sat see note, Ferritin 173/ 04-14-25 @ 05:30    PT/INR - ( 13 Apr 2025 18:32 )   PT: 10.3 sec;   INR: 0.88          PTT - ( 13 Apr 2025 18:32 )  PTT:29.3 sec  Urinalysis Basic - ( 14 Apr 2025 05:30 )    Color: x / Appearance: x / SG: x / pH: x  Gluc: 93 mg/dL / Ketone: x  / Bili: x / Urobili: x   Blood: x / Protein: x / Nitrite: x   Leuk Esterase: x / RBC: x / WBC x   Sq Epi: x / Non Sq Epi: x / Bacteria: x      CAPILLARY BLOOD GLUCOSE          ABG - ( 13 Apr 2025 20:07 )  pH, Arterial: 7.42  pH, Blood: x     /  pCO2: 38    /  pO2: 124   / HCO3: 25    / Base Excess: 0.3   /  SaO2: 98.9              Urinalysis Basic - ( 14 Apr 2025 05:30 )    Color: x / Appearance: x / SG: x / pH: x  Gluc: 93 mg/dL / Ketone: x  / Bili: x / Urobili: x   Blood: x / Protein: x / Nitrite: x   Leuk Esterase: x / RBC: x / WBC x   Sq Epi: x / Non Sq Epi: x / Bacteria: x        MEDICATIONS  (STANDING):  albuterol/ipratropium for Nebulization 3 milliLiter(s) Nebulizer every 4 hours  amitriptyline 25 milliGRAM(s) Oral every 24 hours  ascorbic acid 500 milliGRAM(s) Oral daily  azithromycin  IVPB      azithromycin  IVPB 500 milliGRAM(s) IV Intermittent every 24 hours  baclofen 20 milliGRAM(s) Oral every 8 hours  baclofen 15 milliGRAM(s) Oral once  cefTRIAXone   IVPB 2000 milliGRAM(s) IV Intermittent every 24 hours  chlorhexidine 2% Cloths 1 Application(s) Topical <User Schedule>  enoxaparin Injectable 40 milliGRAM(s) SubCutaneous every 24 hours  gabapentin 300 milliGRAM(s) Oral every 8 hours  melatonin 5 milliGRAM(s) Oral at bedtime  multivitamin 1 Tablet(s) Oral daily  polyethylene glycol 3350 17 Gram(s) Oral every 12 hours  senna 2 Tablet(s) Oral at bedtime    MEDICATIONS  (PRN):  bisacodyl Suppository 10 milliGRAM(s) Rectal at bedtime PRN Constipation  cyclobenzaprine 5 milliGRAM(s) Oral three times a day PRN Muscle Spasm  racepinephrine  2.25% Inhalation 0.5 milliLiter(s) Inhalation once PRN Worsening wheezing/dyspnea  sodium chloride 0.65% Nasal 1 Spray(s) Both Nostrils every 8 hours PRN Nasal Congestion      RADIOLOGY & ADDITIONAL TESTS: Reviewed

## 2025-04-14 NOTE — PROGRESS NOTE ADULT - ASSESSMENT
56yo F w/ pmhx of Pneumonia requiring intubation in 2023 s/p trach with reversal in 2024, MI 2/2 birth control induced hypercoagulability in 2001, quadriplegia secondary to neck fracture, bladder resection s/p urostomy, chronic pain, and constipation who presents with AHRF 2/2 PNA with concern for possible Asthma exacerbation    Neuro  #Quadriplegia   Holding home pain meds until respiratory status improves, can c/w Tylenol as needed    Cardiovascular  #Hx of MI without stents  Patient endorses hx of MI in 2001 due to hypercoagulable state while on birth control. Denies any interventions or stent placement  Plan:  - obtain collateral upon discharge    Pulm  #AHRF  #PNA  #Enterovirus  Patient presents with AHRF requiring increasing oxygen support, eventually placed on Bipap. Patient denies official diagnosis of asthma but endorses wheeze on arrival, home med of albuterol. Found to have L sided consolidation on POCUS, also found to be + Enterovirus. ABG done in ED was after patient was on Bipap for only a few minutes. Shows normal pH with pCO2 of 37 indicating patient was likely tiring out and heading towards worsening respiratory status. Recieved 125mg Solumedrol in ED, duonebs, and magnesium.  Plan:  - off Bipap  -  now on NC cannula - titrate down as needed  - c/w standing duonebs q4h  - start Solumedrol 40mg qd tomorrow  - antibiotics as below  - encourage cessation of marijuana smoking    GI  #Constipation  C/w bowel regimen when off Bipap    #PPI on steroids      #bladder resection s/p urostomy  UA negative on arrival, urostomy bag with yellow urine without sediment    Renal  VIKTOR    ID  #PNA  #Enterovirus  Patient presents with AHRF 2/2 PNA. L sided consolidation noted on POCUS. R side normal. Possibly viral PNA vs superimposed bacterial infection (CAP). Was prescribed Amoxicillin 2 days ago without improvement in symptoms  Plan:  - c/w CTX and Azithro  - f/u urine strept and legionella  - f/u AM procal    F: s/p 1L  E: replete as needed  N: NPO until off bipap and stable  DVT: lovenox  Dispo: MICU    Plan discussed with Dr. Sevilla     54yo F w/ pmhx of Pneumonia requiring intubation in 2023 s/p trach with reversal in 2024, MI 2/2 birth control induced hypercoagulability in 2001, quadriplegia secondary to neck fracture, bladder resection s/p urostomy, chronic pain, and constipation who presents with AHRF 2/2 PNA with concern for possible Asthma exacerbation    Neuro  #Quadriplegia   #spasms  Home meds: baclofen, amitryptyline, flexeril, gabapentin  Resumed home meds as respiratory status has improved  -c/w baclofen  -c/w amitriptyline   -c/w gabapentin  -c/w flexeril PRN    Cardiovascular  #Hx of MI without stents  Patient endorses hx of MI in 2001 due to hypercoagulable state while on birth control. Denies any interventions or stent placement  Plan:  - VIKTOR  - obtain collateral upon discharge    Pulm  #AHRF  #PNA  #Enterovirus  Patient presents with AHRF requiring increasing oxygen support, eventually placed on Bipap. Patient denies official diagnosis of asthma but endorses wheeze on arrival, home med of albuterol. Found to have L sided consolidation on POCUS, also found to be + Enterovirus. ABG done in ED was after patient was on Bipap for only a few minutes. Shows normal pH with pCO2 of 37 indicating patient was likely tiring out and heading towards worsening respiratory status. Recieved 125mg Solumedrol in ED, duonebs, and magnesium.  Plan:  - c/w BiPAP o/n  - on 3-4L NC, wean to RA as tolerated  - c/w standing duonebs q4h  - start Solumedrol 40mg qd to complete 7 day course tomorrow (4/15-4/21)  - antibiotics as below    GI  #Constipation  C/w bowel regimen when off Bipap    #PPI on steroids      #bladder resection s/p urostomy  UA negative on arrival, urostomy bag with yellow urine without sediment    Renal  VIKTOR    ID  #PNA  #Enterovirus  Patient presents with AHRF 2/2 PNA. L sided consolidation noted on POCUS. R side normal. Possibly viral PNA vs superimposed bacterial infection (CAP). Was prescribed Amoxicillin 2 days ago without improvement in symptoms. Urine strep/legionella negative.     Plan:  - c/w CTX and Azithro  - as above for AHRF    F: none  E: replete as needed  N: regular  DVT: lovenox  Dispo: [id number]

## 2025-04-14 NOTE — PROGRESS NOTE ADULT - SUBJECTIVE AND OBJECTIVE BOX
SUBJECTIVE:  AMY REA is doing well this morning. Today is hospital day 1d.     24 Hour Events:   - No acute overnight events.    4/13: called from ED holding that pt not doing well w/ increasing accessory muscle use and satting 90% on 6L. BNP wnl. +entero/rhinovirus. ABG from 1930 7.42.   O/n: Diazepam 2 mg given for anxiety, Repeat dose given further anxiety. Urine strept and legionella negative.    serial ABGs    ABG - ( 13 Apr 2025 20:07 )  pH, Arterial: 7.42  pH, Blood: x     /  pCO2: 38    /  pO2: 124   / HCO3: 25    / Base Excess: 0.3   /  SaO2: 98.9    Fio2 60%  P/F = 1/206            MEDICATIONS:  STANDING MEDICATIONS  albuterol/ipratropium for Nebulization 3 milliLiter(s) Nebulizer every 4 hours  amitriptyline 25 milliGRAM(s) Oral every 24 hours  ascorbic acid 500 milliGRAM(s) Oral daily  azithromycin  IVPB      azithromycin  IVPB 500 milliGRAM(s) IV Intermittent every 24 hours  baclofen 5 milliGRAM(s) Oral every 8 hours  cefTRIAXone   IVPB 2000 milliGRAM(s) IV Intermittent every 24 hours  chlorhexidine 2% Cloths 1 Application(s) Topical <User Schedule>  enoxaparin Injectable 40 milliGRAM(s) SubCutaneous every 24 hours  gabapentin 400 milliGRAM(s) Oral <User Schedule>  melatonin 5 milliGRAM(s) Oral at bedtime  methylPREDNISolone sodium succinate Injectable 40 milliGRAM(s) IV Push every 24 hours  multivitamin 1 Tablet(s) Oral daily  pantoprazole  Injectable 40 milliGRAM(s) IV Push daily  senna 2 Tablet(s) Oral at bedtime    PRN MEDICATIONS  bisacodyl Suppository 10 milliGRAM(s) Rectal at bedtime PRN  cyclobenzaprine 5 milliGRAM(s) Oral three times a day PRN  polyethylene glycol 3350 17 Gram(s) Oral daily PRN  racepinephrine  2.25% Inhalation 0.5 milliLiter(s) Inhalation once PRN    VITALS:   ICU Vital Signs Last 24 Hrs  T(C): 36.7 (14 Apr 2025 05:33), Max: 36.7 (13 Apr 2025 18:40)  T(F): 98.1 (14 Apr 2025 05:33), Max: 98.1 (14 Apr 2025 05:33)  HR: 80 (14 Apr 2025 06:00) (60 - 99)  BP: 146/97 (14 Apr 2025 06:00) (81/52 - 182/88)  BP(mean): 116 (14 Apr 2025 06:00) (80 - 126)  ABP: --  ABP(mean): --  RR: 21 (14 Apr 2025 06:00) (20 - 35)  SpO2: 95% (14 Apr 2025 06:00) (90% - 99%)    O2 Parameters below as of 14 Apr 2025 06:00  Patient On (Oxygen Delivery Method): nasal cannula  O2 Flow (L/min): 3        PHYSICAL EXAM  General: well appearing, in no acute distress    HEENT: NC/AT, sclera anicteric, conjunctiva clear, mucus membranes moist, no lymphadenopathy, no JVD appreciated   Lungs: anterior and posterior lung fields clear to auscultation bilaterally, no wheezes, rhonchi or rales   Heart: regular rate and rhythm, normal S1 S2, no murmurs/rubs/gallops    Abdomen: soft, non-tender, non-distended, bowel sounds present   Extremities: atraumatic, no lower extremity edema, clubbing or cyanosis, distal pulses 2+ at DP and radial bilaterally    Skin: normal skin texture and turgor without rashes or lesions, no diaphoresis   Neuro: A&Ox3, clear speech, facial features symmetrical, moving all extremities spontaneously, strength grossly intact, sensation intact to light touch at distal upper and lower extremities bilaterally     04-13-25 @ 07:01  -  04-14-25 @ 06:41  --------------------------------------------------------  IN: 270 mL / OUT: 930 mL / NET: -660 mL        Orthostatic VS      VENT DATA:      LABS:                        11.9   4.94  )-----------( 222      ( 14 Apr 2025 05:30 )             34.6     04-14    136  |  100  |  6[L]  ----------------------------<  93  3.8   |  22  |  0.16[L]    Ca    9.1      14 Apr 2025 05:30  Phos  3.7     04-14  Mg     2.1     04-14    TPro  6.9  /  Alb  4.2  /  TBili  0.3  /  DBili  x   /  AST  26  /  ALT  19  /  AlkPhos  102  04-14    PT/INR - ( 13 Apr 2025 18:32 )   PT: 10.3 sec;   INR: 0.88          PTT - ( 13 Apr 2025 18:32 )  PTT:29.3 sec  Urinalysis Basic - ( 14 Apr 2025 05:30 )    Color: x / Appearance: x / SG: x / pH: x  Gluc: 93 mg/dL / Ketone: x  / Bili: x / Urobili: x   Blood: x / Protein: x / Nitrite: x   Leuk Esterase: x / RBC: x / WBC x   Sq Epi: x / Non Sq Epi: x / Bacteria: x      ABG - ( 13 Apr 2025 20:07 )  pH, Arterial: 7.42  pH, Blood: x     /  pCO2: 38    /  pO2: 124   / HCO3: 25    / Base Excess: 0.3   /  SaO2: 98.9              Lactate, Blood: 0.4 mmol/L *L* (04-13-25 @ 13:21)        MICRO:    CARDS:        RADIOLOGY:        Lines:  Central line:              Date placed:             Indication:   Intravenous Access:   NG tube:   Hollins Catheter:          hospital course * transfer from [facility]    Patient is 54yo F w/ pmhx of Pneumonia requiring intubation in 2023 s/p trach with reversal in 2024, MI 2/2 birth control induced hypercoagulability in 2001, quadriplegia secondary to neck fracture, bladder resection s/p urostomy, chronic pain, and constipation with daily marijuana use smoked who presents with complaints of dyspnea, cough productive of white sputum, fevers/chills for several days. Patient states she first experiences symptoms about 2-3 days ago and states it felt like her previous episodes of PNA. She was prescribed Amoxicillin 2 days ago with almost no relief of symptoms. She endorses a cough productive of white sputum, denies hemoptysis, and some associated wheezing, denies any hx of asthma or COPD. Patient states symptoms continued to worsen this morning prompting her to come to the ED.     Patient states she followed with Pulm outpatient once she got her trach a couple of years ago and continued to follow until it was reversed. Denies ever being told of any diagnosis of Asthma or COPD, does not recall having any PFT's done. She notes a distant hx of MI due to being on birth control but never had stents placed, no hx of HF either.     Patient originally admitted to medicine, reported to be doing well on nasal cannula. Once admission team saw the patient in the ED, patient was noted to be in respiratory distress, tripoding having difficulty completing sentences. Patient was placed on non rebreather and given duonebs, slight improvement in symptoms. She was then placed on Bipap for increased work of breathing with further improvement. Bedside POCUS done showing A lines with L sided consolidation. Heart function grossly normal.     Patient now on NC improving with good saturations, no longer requiring bipap.       SUBJECTIVE:  AMY REA is doing well this morning. Today is hospital day 1d.     24 Hour Events:   - No acute overnight events.    4/13: called from ED holding that pt not doing well w/ increasing accessory muscle use and satting 90% on 6L. BNP wnl. +entero/rhinovirus. ABG from 1930 7.42.   O/n: Diazepam 2 mg given for anxiety, Repeat dose given further anxiety. Urine strept and legionella negative.    serial ABGs    ABG - ( 13 Apr 2025 20:07 )  pH, Arterial: 7.42  pH, Blood: x     /  pCO2: 38    /  pO2: 124   / HCO3: 25    / Base Excess: 0.3   /  SaO2: 98.9    Fio2 60%  P/F = 1/206            MEDICATIONS:  STANDING MEDICATIONS  albuterol/ipratropium for Nebulization 3 milliLiter(s) Nebulizer every 4 hours  amitriptyline 25 milliGRAM(s) Oral every 24 hours  ascorbic acid 500 milliGRAM(s) Oral daily  azithromycin  IVPB      azithromycin  IVPB 500 milliGRAM(s) IV Intermittent every 24 hours  baclofen 5 milliGRAM(s) Oral every 8 hours  cefTRIAXone   IVPB 2000 milliGRAM(s) IV Intermittent every 24 hours  chlorhexidine 2% Cloths 1 Application(s) Topical <User Schedule>  enoxaparin Injectable 40 milliGRAM(s) SubCutaneous every 24 hours  gabapentin 400 milliGRAM(s) Oral <User Schedule>  melatonin 5 milliGRAM(s) Oral at bedtime  methylPREDNISolone sodium succinate Injectable 40 milliGRAM(s) IV Push every 24 hours  multivitamin 1 Tablet(s) Oral daily  pantoprazole  Injectable 40 milliGRAM(s) IV Push daily  senna 2 Tablet(s) Oral at bedtime    PRN MEDICATIONS  bisacodyl Suppository 10 milliGRAM(s) Rectal at bedtime PRN  cyclobenzaprine 5 milliGRAM(s) Oral three times a day PRN  polyethylene glycol 3350 17 Gram(s) Oral daily PRN  racepinephrine  2.25% Inhalation 0.5 milliLiter(s) Inhalation once PRN    VITALS:   ICU Vital Signs Last 24 Hrs  T(C): 36.7 (14 Apr 2025 05:33), Max: 36.7 (13 Apr 2025 18:40)  T(F): 98.1 (14 Apr 2025 05:33), Max: 98.1 (14 Apr 2025 05:33)  HR: 80 (14 Apr 2025 06:00) (60 - 99)  BP: 146/97 (14 Apr 2025 06:00) (81/52 - 182/88)  BP(mean): 116 (14 Apr 2025 06:00) (80 - 126)  ABP: --  ABP(mean): --  RR: 21 (14 Apr 2025 06:00) (20 - 35)  SpO2: 95% (14 Apr 2025 06:00) (90% - 99%)    O2 Parameters below as of 14 Apr 2025 06:00  Patient On (Oxygen Delivery Method): nasal cannula  O2 Flow (L/min): 3        PHYSICAL EXAM  General: appears to be in mild distress  HEENT: NC/AT; EOMI, PERRLA, anicteric sclera; moist mucosal membranes  Cardiovascular: RRR, +S1/S2; NO M/R/G  Respiratory: BL rhonchi anteriorly  Gastrointestinal: soft, NT/ND; +BSx4  : urostomy bag with yellow urine  Extremities: WWP; no edema or cyanosis  Vascular: 2+ radial, DP/PT pulses B/L  Neurological: AAOx3; no focal deficits      04-13-25 @ 07:01  -  04-14-25 @ 06:41  --------------------------------------------------------  IN: 270 mL / OUT: 930 mL / NET: -660 mL        Orthostatic VS      VENT DATA:      LABS:                        11.9   4.94  )-----------( 222      ( 14 Apr 2025 05:30 )             34.6     04-14    136  |  100  |  6[L]  ----------------------------<  93  3.8   |  22  |  0.16[L]    Ca    9.1      14 Apr 2025 05:30  Phos  3.7     04-14  Mg     2.1     04-14    TPro  6.9  /  Alb  4.2  /  TBili  0.3  /  DBili  x   /  AST  26  /  ALT  19  /  AlkPhos  102  04-14    PT/INR - ( 13 Apr 2025 18:32 )   PT: 10.3 sec;   INR: 0.88          PTT - ( 13 Apr 2025 18:32 )  PTT:29.3 sec  Urinalysis Basic - ( 14 Apr 2025 05:30 )    Color: x / Appearance: x / SG: x / pH: x  Gluc: 93 mg/dL / Ketone: x  / Bili: x / Urobili: x   Blood: x / Protein: x / Nitrite: x   Leuk Esterase: x / RBC: x / WBC x   Sq Epi: x / Non Sq Epi: x / Bacteria: x      ABG - ( 13 Apr 2025 20:07 )  pH, Arterial: 7.42  pH, Blood: x     /  pCO2: 38    /  pO2: 124   / HCO3: 25    / Base Excess: 0.3   /  SaO2: 98.9              Lactate, Blood: 0.4 mmol/L *L* (04-13-25 @ 13:21)     Patient is 54yo F w/ pmhx of Pneumonia requiring intubation in 2023 s/p trach with reversal in 2024, MI 2/2 birth control induced hypercoagulability in 2001, quadriplegia secondary to neck fracture, bladder resection s/p urostomy, chronic pain, and constipation with daily marijuana use smoked who presents with complaints of dyspnea, cough productive of white sputum, fevers/chills for several days. Patient states she first experiences symptoms about 2-3 days ago and states it felt like her previous episodes of PNA. She was prescribed Amoxicillin 2 days ago with almost no relief of symptoms. She endorses a cough productive of white sputum, denies hemoptysis, and some associated wheezing, denies any hx of asthma or COPD. Patient states symptoms continued to worsen this morning prompting her to come to the ED.     Patient states she followed with Pulm outpatient once she got her trach a couple of years ago and continued to follow until it was reversed. Denies ever being told of any diagnosis of Asthma or COPD, does not recall having any PFT's done. She notes a distant hx of MI due to being on birth control but never had stents placed, no hx of HF either.     Patient originally admitted to medicine, reported to be doing well on nasal cannula. Once admission team saw the patient in the ED, patient was noted to be in respiratory distress, tripoding having difficulty completing sentences. Patient was placed on non rebreather and given duonebs, slight improvement in symptoms. She was then placed on Bipap for increased work of breathing with further improvement. Bedside POCUS done showing A lines with L sided consolidation. Heart function grossly normal.     Patient now on NC improving with good saturations, no longer requiring bipap.       SUBJECTIVE:  AMY REA is doing well this morning. Today is hospital day 1d.     24 Hour Events:   - No acute overnight events.    4/13: called from ED holding that pt not doing well w/ increasing accessory muscle use and satting 90% on 6L. BNP wnl. +entero/rhinovirus. ABG from 1930 7.42.   O/n: Diazepam 2 mg given for anxiety, Repeat dose given further anxiety. Urine strept and legionella negative.    serial ABGs    ABG - ( 13 Apr 2025 20:07 )  pH, Arterial: 7.42  pH, Blood: x     /  pCO2: 38    /  pO2: 124   / HCO3: 25    / Base Excess: 0.3   /  SaO2: 98.9    Fio2 60%  P/F = 1/206            MEDICATIONS:  STANDING MEDICATIONS  albuterol/ipratropium for Nebulization 3 milliLiter(s) Nebulizer every 4 hours  amitriptyline 25 milliGRAM(s) Oral every 24 hours  ascorbic acid 500 milliGRAM(s) Oral daily  azithromycin  IVPB      azithromycin  IVPB 500 milliGRAM(s) IV Intermittent every 24 hours  baclofen 5 milliGRAM(s) Oral every 8 hours  cefTRIAXone   IVPB 2000 milliGRAM(s) IV Intermittent every 24 hours  chlorhexidine 2% Cloths 1 Application(s) Topical <User Schedule>  enoxaparin Injectable 40 milliGRAM(s) SubCutaneous every 24 hours  gabapentin 400 milliGRAM(s) Oral <User Schedule>  melatonin 5 milliGRAM(s) Oral at bedtime  methylPREDNISolone sodium succinate Injectable 40 milliGRAM(s) IV Push every 24 hours  multivitamin 1 Tablet(s) Oral daily  pantoprazole  Injectable 40 milliGRAM(s) IV Push daily  senna 2 Tablet(s) Oral at bedtime    PRN MEDICATIONS  bisacodyl Suppository 10 milliGRAM(s) Rectal at bedtime PRN  cyclobenzaprine 5 milliGRAM(s) Oral three times a day PRN  polyethylene glycol 3350 17 Gram(s) Oral daily PRN  racepinephrine  2.25% Inhalation 0.5 milliLiter(s) Inhalation once PRN    VITALS:   ICU Vital Signs Last 24 Hrs  T(C): 36.7 (14 Apr 2025 05:33), Max: 36.7 (13 Apr 2025 18:40)  T(F): 98.1 (14 Apr 2025 05:33), Max: 98.1 (14 Apr 2025 05:33)  HR: 80 (14 Apr 2025 06:00) (60 - 99)  BP: 146/97 (14 Apr 2025 06:00) (81/52 - 182/88)  BP(mean): 116 (14 Apr 2025 06:00) (80 - 126)  ABP: --  ABP(mean): --  RR: 21 (14 Apr 2025 06:00) (20 - 35)  SpO2: 95% (14 Apr 2025 06:00) (90% - 99%)    O2 Parameters below as of 14 Apr 2025 06:00  Patient On (Oxygen Delivery Method): nasal cannula  O2 Flow (L/min): 3        PHYSICAL EXAM  General: appears to be in mild distress  HEENT: NC/AT; EOMI, PERRLA, anicteric sclera; moist mucosal membranes  Cardiovascular: RRR, +S1/S2; NO M/R/G  Respiratory: BL rhonchi anteriorly  Gastrointestinal: soft, NT/ND; +BSx4  : urostomy bag with yellow urine  Extremities: WWP; no edema or cyanosis  Vascular: 2+ radial, DP/PT pulses B/L  Neurological: AAOx3; no focal deficits      04-13-25 @ 07:01  -  04-14-25 @ 06:41  --------------------------------------------------------  IN: 270 mL / OUT: 930 mL / NET: -660 mL        Orthostatic VS      VENT DATA:      LABS:                        11.9   4.94  )-----------( 222      ( 14 Apr 2025 05:30 )             34.6     04-14    136  |  100  |  6[L]  ----------------------------<  93  3.8   |  22  |  0.16[L]    Ca    9.1      14 Apr 2025 05:30  Phos  3.7     04-14  Mg     2.1     04-14    TPro  6.9  /  Alb  4.2  /  TBili  0.3  /  DBili  x   /  AST  26  /  ALT  19  /  AlkPhos  102  04-14    PT/INR - ( 13 Apr 2025 18:32 )   PT: 10.3 sec;   INR: 0.88          PTT - ( 13 Apr 2025 18:32 )  PTT:29.3 sec  Urinalysis Basic - ( 14 Apr 2025 05:30 )    Color: x / Appearance: x / SG: x / pH: x  Gluc: 93 mg/dL / Ketone: x  / Bili: x / Urobili: x   Blood: x / Protein: x / Nitrite: x   Leuk Esterase: x / RBC: x / WBC x   Sq Epi: x / Non Sq Epi: x / Bacteria: x      ABG - ( 13 Apr 2025 20:07 )  pH, Arterial: 7.42  pH, Blood: x     /  pCO2: 38    /  pO2: 124   / HCO3: 25    / Base Excess: 0.3   /  SaO2: 98.9              Lactate, Blood: 0.4 mmol/L *L* (04-13-25 @ 13:21)

## 2025-04-14 NOTE — PROGRESS NOTE ADULT - ASSESSMENT
54yo F w/ pmhx of Pneumonia requiring intubation in 2023 s/p trach with reversal in 2024, MI 2/2 birth control induced hypercoagulability in 2001, quadriplegia secondary to neck fracture, bladder resection s/p urostomy, chronic pain, and constipation who presents with AHRF 2/2 PNA with concern for possible Asthma exacerbation    Neuro  #Quadriplegia   Holding home pain meds until respiratory status improves, can c/w Tylenol as needed    Cardiovascular  #Hx of MI without stents  Patient endorses hx of MI in 2001 due to hypercoagulable state while on birth control. Denies any interventions or stent placement  Plan:  - obtain collateral upon discharge    Pulm  #AHRF  #PNA  #Enterovirus  Patient presents with AHRF requiring increasing oxygen support, eventually placed on Bipap. Patient denies official diagnosis of asthma but endorses wheeze on arrival, home med of albuterol. Found to have L sided consolidation on POCUS, also found to be + Enterovirus. ABG done in ED was after patient was on Bipap for only a few minutes. Shows normal pH with pCO2 of 37 indicating patient was likely tiring out and heading towards worsening respiratory status. Recieved 125mg Solumedrol in ED, duonebs, and magnesium.  Plan:  - off Bipap  -  now on NC cannula - titrate down as needed  - c/w standing duonebs q4h  - start Solumedrol 40mg qd tomorrow  - antibiotics as below  - encourage cessation of marijuana smoking    GI  #Constipation  C/w bowel regimen when off Bipap    #PPI on steroids      #bladder resection s/p urostomy  UA negative on arrival, urostomy bag with yellow urine without sediment    Renal  VIKTOR    ID  #PNA  #Enterovirus  Patient presents with AHRF 2/2 PNA. L sided consolidation noted on POCUS. R side normal. Possibly viral PNA vs superimposed bacterial infection (CAP). Was prescribed Amoxicillin 2 days ago without improvement in symptoms  Plan:  - c/w CTX and Azithro  - f/u urine strept and legionella  - f/u AM procal    F: s/p 1L  E: replete as needed  N: NPO until off bipap and stable  DVT: lovenox  Dispo: MICU    Plan discussed with Dr. Sevilla     56yo F w/ pmhx of Pneumonia requiring intubation in 2023 s/p trach with reversal in 2024, MI 2/2 birth control induced hypercoagulability in 2001, quadriplegia secondary to neck fracture, bladder resection s/p urostomy, chronic pain, and constipation who presents with AHRF 2/2 PNA with concern for enteorvirus related reactive airway syndrome    Neuro  #Quadriplegia   Holding home pain meds until respiratory status improves, can c/w Tylenol as needed    Cardiovascular  #Hx of MI without stents  Patient endorses hx of MI in 2001 due to hypercoagulable state while on birth control. Denies any interventions or stent placement  Plan:  - obtain collateral upon discharge    Pulm  #AHRF  #PNA  #Enterovirus  Patient presents with AHRF requiring increasing oxygen support, eventually placed on Bipap. Patient denies official diagnosis of asthma but endorses wheeze on arrival, home med of albuterol. Found to have L sided consolidation on POCUS, also found to be + Enterovirus. ABG done in ED was after patient was on Bipap for only a few minutes. Shows normal pH with pCO2 of 37 indicating patient was likely tiring out and heading towards worsening respiratory status. Recieved 125mg Solumedrol in ED, duonebs, and magnesium.  Plan:  - off Bipap  -  now on NC cannula - titrate down as needed  - c/w standing duonebs q4h  - start Solumedrol 40mg qd tomorrow  - antibiotics as below  - encourage cessation of marijuana smoking    GI  #Constipation  C/w bowel regimen when off Bipap    #PPI on steroids      #bladder resection s/p urostomy  UA negative on arrival, urostomy bag with yellow urine without sediment    Renal  VIKTOR    ID  #PNA  #Enterovirus  Patient presents with AHRF 2/2 PNA. L sided consolidation noted on POCUS. R side normal. Possibly viral PNA vs superimposed bacterial infection (CAP). Was prescribed Amoxicillin 2 days ago without improvement in symptoms  Plan:  - c/w CTX and Azithro  - f/u urine strept and legionella  - f/u AM procal    F: s/p 1L  E: replete as needed  N: NPO until off bipap and stable  DVT: lovenox  Dispo: MICU    Plan discussed with Dr. Sevilla

## 2025-04-15 ENCOUNTER — TRANSCRIPTION ENCOUNTER (OUTPATIENT)
Age: 55
End: 2025-04-15

## 2025-04-15 VITALS — OXYGEN SATURATION: 93 % | HEART RATE: 90 BPM

## 2025-04-15 LAB
ALBUMIN SERPL ELPH-MCNC: 4.4 G/DL — SIGNIFICANT CHANGE UP (ref 3.3–5)
ALP SERPL-CCNC: 110 U/L — SIGNIFICANT CHANGE UP (ref 40–120)
ALT FLD-CCNC: 19 U/L — SIGNIFICANT CHANGE UP (ref 10–45)
ANION GAP SERPL CALC-SCNC: 17 MMOL/L — SIGNIFICANT CHANGE UP (ref 5–17)
AST SERPL-CCNC: SIGNIFICANT CHANGE UP (ref 10–40)
BASOPHILS # BLD AUTO: 0.01 K/UL — SIGNIFICANT CHANGE UP (ref 0–0.2)
BASOPHILS NFR BLD AUTO: 0.2 % — SIGNIFICANT CHANGE UP (ref 0–2)
BILIRUB SERPL-MCNC: 0.3 MG/DL — SIGNIFICANT CHANGE UP (ref 0.2–1.2)
BUN SERPL-MCNC: 9 MG/DL — SIGNIFICANT CHANGE UP (ref 7–23)
CALCIUM SERPL-MCNC: 9.5 MG/DL — SIGNIFICANT CHANGE UP (ref 8.4–10.5)
CHLORIDE SERPL-SCNC: 95 MMOL/L — LOW (ref 96–108)
CO2 SERPL-SCNC: 20 MMOL/L — LOW (ref 22–31)
CREAT SERPL-MCNC: 0.21 MG/DL — LOW (ref 0.5–1.3)
EGFR: 137 ML/MIN/1.73M2 — SIGNIFICANT CHANGE UP
EGFR: 137 ML/MIN/1.73M2 — SIGNIFICANT CHANGE UP
EOSINOPHIL # BLD AUTO: 0.04 K/UL — SIGNIFICANT CHANGE UP (ref 0–0.5)
EOSINOPHIL NFR BLD AUTO: 0.6 % — SIGNIFICANT CHANGE UP (ref 0–6)
GLUCOSE SERPL-MCNC: 68 MG/DL — LOW (ref 70–99)
HCT VFR BLD CALC: 39.3 % — SIGNIFICANT CHANGE UP (ref 34.5–45)
HGB BLD-MCNC: 13.4 G/DL — SIGNIFICANT CHANGE UP (ref 11.5–15.5)
IMM GRANULOCYTES NFR BLD AUTO: 0.5 % — SIGNIFICANT CHANGE UP (ref 0–0.9)
LACTATE SERPL-SCNC: 0.6 MMOL/L — SIGNIFICANT CHANGE UP (ref 0.5–2)
LYMPHOCYTES # BLD AUTO: 0.7 K/UL — LOW (ref 1–3.3)
LYMPHOCYTES # BLD AUTO: 10.9 % — LOW (ref 13–44)
MAGNESIUM SERPL-MCNC: 2.1 MG/DL — SIGNIFICANT CHANGE UP (ref 1.6–2.6)
MCHC RBC-ENTMCNC: 32.8 PG — SIGNIFICANT CHANGE UP (ref 27–34)
MCHC RBC-ENTMCNC: 34.1 G/DL — SIGNIFICANT CHANGE UP (ref 32–36)
MCV RBC AUTO: 96.1 FL — SIGNIFICANT CHANGE UP (ref 80–100)
MONOCYTES # BLD AUTO: 0.58 K/UL — SIGNIFICANT CHANGE UP (ref 0–0.9)
MONOCYTES NFR BLD AUTO: 9 % — SIGNIFICANT CHANGE UP (ref 2–14)
NEUTROPHILS # BLD AUTO: 5.09 K/UL — SIGNIFICANT CHANGE UP (ref 1.8–7.4)
NEUTROPHILS NFR BLD AUTO: 78.8 % — HIGH (ref 43–77)
NRBC BLD AUTO-RTO: 0 /100 WBCS — SIGNIFICANT CHANGE UP (ref 0–0)
PHOSPHATE SERPL-MCNC: 3.5 MG/DL — SIGNIFICANT CHANGE UP (ref 2.5–4.5)
PLATELET # BLD AUTO: 212 K/UL — SIGNIFICANT CHANGE UP (ref 150–400)
POTASSIUM SERPL-MCNC: 3.9 MMOL/L — SIGNIFICANT CHANGE UP (ref 3.5–5.3)
POTASSIUM SERPL-SCNC: 3.9 MMOL/L — SIGNIFICANT CHANGE UP (ref 3.5–5.3)
PROT SERPL-MCNC: 7.4 G/DL — SIGNIFICANT CHANGE UP (ref 6–8.3)
RBC # BLD: 4.09 M/UL — SIGNIFICANT CHANGE UP (ref 3.8–5.2)
RBC # FLD: 15.5 % — HIGH (ref 10.3–14.5)
SODIUM SERPL-SCNC: 132 MMOL/L — LOW (ref 135–145)
WBC # BLD: 6.45 K/UL — SIGNIFICANT CHANGE UP (ref 3.8–10.5)
WBC # FLD AUTO: 6.45 K/UL — SIGNIFICANT CHANGE UP (ref 3.8–10.5)

## 2025-04-15 PROCEDURE — 80053 COMPREHEN METABOLIC PANEL: CPT

## 2025-04-15 PROCEDURE — 84100 ASSAY OF PHOSPHORUS: CPT

## 2025-04-15 PROCEDURE — 84300 ASSAY OF URINE SODIUM: CPT

## 2025-04-15 PROCEDURE — 86900 BLOOD TYPING SEROLOGIC ABO: CPT

## 2025-04-15 PROCEDURE — 87899 AGENT NOS ASSAY W/OPTIC: CPT

## 2025-04-15 PROCEDURE — 82728 ASSAY OF FERRITIN: CPT

## 2025-04-15 PROCEDURE — 99285 EMERGENCY DEPT VISIT HI MDM: CPT

## 2025-04-15 PROCEDURE — 85730 THROMBOPLASTIN TIME PARTIAL: CPT

## 2025-04-15 PROCEDURE — 94640 AIRWAY INHALATION TREATMENT: CPT

## 2025-04-15 PROCEDURE — 0225U NFCT DS DNA&RNA 21 SARSCOV2: CPT

## 2025-04-15 PROCEDURE — 96374 THER/PROPH/DIAG INJ IV PUSH: CPT

## 2025-04-15 PROCEDURE — 83605 ASSAY OF LACTIC ACID: CPT

## 2025-04-15 PROCEDURE — 84484 ASSAY OF TROPONIN QUANT: CPT

## 2025-04-15 PROCEDURE — 93005 ELECTROCARDIOGRAM TRACING: CPT

## 2025-04-15 PROCEDURE — 83935 ASSAY OF URINE OSMOLALITY: CPT

## 2025-04-15 PROCEDURE — 80048 BASIC METABOLIC PNL TOTAL CA: CPT

## 2025-04-15 PROCEDURE — 83550 IRON BINDING TEST: CPT

## 2025-04-15 PROCEDURE — 85025 COMPLETE CBC W/AUTO DIFF WBC: CPT

## 2025-04-15 PROCEDURE — 82570 ASSAY OF URINE CREATININE: CPT

## 2025-04-15 PROCEDURE — 81003 URINALYSIS AUTO W/O SCOPE: CPT

## 2025-04-15 PROCEDURE — 83540 ASSAY OF IRON: CPT

## 2025-04-15 PROCEDURE — 84443 ASSAY THYROID STIM HORMONE: CPT

## 2025-04-15 PROCEDURE — 83735 ASSAY OF MAGNESIUM: CPT

## 2025-04-15 PROCEDURE — 84156 ASSAY OF PROTEIN URINE: CPT

## 2025-04-15 PROCEDURE — 84540 ASSAY OF URINE/UREA-N: CPT

## 2025-04-15 PROCEDURE — 87637 SARSCOV2&INF A&B&RSV AMP PRB: CPT

## 2025-04-15 PROCEDURE — 86850 RBC ANTIBODY SCREEN: CPT

## 2025-04-15 PROCEDURE — 85610 PROTHROMBIN TIME: CPT

## 2025-04-15 PROCEDURE — 36415 COLL VENOUS BLD VENIPUNCTURE: CPT

## 2025-04-15 PROCEDURE — 84133 ASSAY OF URINE POTASSIUM: CPT

## 2025-04-15 PROCEDURE — 85379 FIBRIN DEGRADATION QUANT: CPT

## 2025-04-15 PROCEDURE — 96375 TX/PRO/DX INJ NEW DRUG ADDON: CPT

## 2025-04-15 PROCEDURE — 83880 ASSAY OF NATRIURETIC PEPTIDE: CPT

## 2025-04-15 PROCEDURE — 86901 BLOOD TYPING SEROLOGIC RH(D): CPT

## 2025-04-15 PROCEDURE — 71045 X-RAY EXAM CHEST 1 VIEW: CPT

## 2025-04-15 PROCEDURE — 99238 HOSP IP/OBS DSCHRG MGMT 30/<: CPT | Mod: GC

## 2025-04-15 PROCEDURE — 82803 BLOOD GASES ANY COMBINATION: CPT

## 2025-04-15 RX ORDER — IPRATROPIUM BROMIDE AND ALBUTEROL SULFATE .5; 2.5 MG/3ML; MG/3ML
3 SOLUTION RESPIRATORY (INHALATION) ONCE
Refills: 0 | Status: COMPLETED | OUTPATIENT
Start: 2025-04-15 | End: 2025-04-15

## 2025-04-15 RX ORDER — CEFPODOXIME PROXETIL 200 MG/1
1 TABLET, FILM COATED ORAL
Qty: 6 | Refills: 0
Start: 2025-04-15 | End: 2025-04-17

## 2025-04-15 RX ORDER — AZITHROMYCIN 250 MG
500 CAPSULE ORAL ONCE
Refills: 0 | Status: COMPLETED | OUTPATIENT
Start: 2025-04-15 | End: 2025-04-15

## 2025-04-15 RX ORDER — AZITHROMYCIN 250 MG
500 CAPSULE ORAL ONCE
Refills: 0 | Status: DISCONTINUED | OUTPATIENT
Start: 2025-04-16 | End: 2025-04-15

## 2025-04-15 RX ORDER — SODIUM CHLORIDE 9 G/1000ML
500 INJECTION, SOLUTION INTRAVENOUS ONCE
Refills: 0 | Status: COMPLETED | OUTPATIENT
Start: 2025-04-15 | End: 2025-04-15

## 2025-04-15 RX ORDER — TRAZODONE HCL 100 MG
25 TABLET ORAL ONCE
Refills: 0 | Status: COMPLETED | OUTPATIENT
Start: 2025-04-15 | End: 2025-04-15

## 2025-04-15 RX ORDER — AZITHROMYCIN 250 MG
1 CAPSULE ORAL
Qty: 1 | Refills: 0
Start: 2025-04-15 | End: 2025-04-15

## 2025-04-15 RX ORDER — AZITHROMYCIN 250 MG
500 CAPSULE ORAL ONCE
Refills: 0 | Status: DISCONTINUED | OUTPATIENT
Start: 2025-04-15 | End: 2025-04-15

## 2025-04-15 RX ORDER — PREDNISONE 20 MG/1
2 TABLET ORAL
Qty: 10 | Refills: 0
Start: 2025-04-15 | End: 2025-04-19

## 2025-04-15 RX ORDER — BACLOFEN 10 MG/20ML
20 INJECTION INTRATHECAL EVERY 8 HOURS
Refills: 0 | Status: DISCONTINUED | OUTPATIENT
Start: 2025-04-15 | End: 2025-04-15

## 2025-04-15 RX ORDER — SIMETHICONE 80 MG
80 TABLET,CHEWABLE ORAL ONCE
Refills: 0 | Status: COMPLETED | OUTPATIENT
Start: 2025-04-15 | End: 2025-04-15

## 2025-04-15 RX ADMIN — CYCLOBENZAPRINE HYDROCHLORIDE 5 MILLIGRAM(S): 15 CAPSULE, EXTENDED RELEASE ORAL at 12:19

## 2025-04-15 RX ADMIN — Medication 25 MILLIGRAM(S): at 03:00

## 2025-04-15 RX ADMIN — Medication 1 TABLET(S): at 12:18

## 2025-04-15 RX ADMIN — CYCLOBENZAPRINE HYDROCHLORIDE 5 MILLIGRAM(S): 15 CAPSULE, EXTENDED RELEASE ORAL at 01:05

## 2025-04-15 RX ADMIN — BACLOFEN 20 MILLIGRAM(S): 10 INJECTION INTRATHECAL at 05:51

## 2025-04-15 RX ADMIN — GABAPENTIN 300 MILLIGRAM(S): 400 CAPSULE ORAL at 13:26

## 2025-04-15 RX ADMIN — BACLOFEN 20 MILLIGRAM(S): 10 INJECTION INTRATHECAL at 13:27

## 2025-04-15 RX ADMIN — GABAPENTIN 300 MILLIGRAM(S): 400 CAPSULE ORAL at 05:51

## 2025-04-15 RX ADMIN — Medication 500 MILLIGRAM(S): at 02:00

## 2025-04-15 RX ADMIN — IPRATROPIUM BROMIDE AND ALBUTEROL SULFATE 3 MILLILITER(S): .5; 2.5 SOLUTION RESPIRATORY (INHALATION) at 08:45

## 2025-04-15 RX ADMIN — IPRATROPIUM BROMIDE AND ALBUTEROL SULFATE 3 MILLILITER(S): .5; 2.5 SOLUTION RESPIRATORY (INHALATION) at 10:09

## 2025-04-15 RX ADMIN — SODIUM CHLORIDE 1000 MILLILITER(S): 9 INJECTION, SOLUTION INTRAVENOUS at 08:50

## 2025-04-15 RX ADMIN — Medication 1 APPLICATION(S): at 06:22

## 2025-04-15 RX ADMIN — IPRATROPIUM BROMIDE AND ALBUTEROL SULFATE 3 MILLILITER(S): .5; 2.5 SOLUTION RESPIRATORY (INHALATION) at 05:51

## 2025-04-15 RX ADMIN — PREDNISONE 40 MILLIGRAM(S): 20 TABLET ORAL at 09:04

## 2025-04-15 RX ADMIN — Medication 500 MILLIGRAM(S): at 12:18

## 2025-04-15 RX ADMIN — IPRATROPIUM BROMIDE AND ALBUTEROL SULFATE 3 MILLILITER(S): .5; 2.5 SOLUTION RESPIRATORY (INHALATION) at 02:33

## 2025-04-15 RX ADMIN — Medication 80 MILLIGRAM(S): at 08:45

## 2025-04-15 RX ADMIN — IPRATROPIUM BROMIDE AND ALBUTEROL SULFATE 3 MILLILITER(S): .5; 2.5 SOLUTION RESPIRATORY (INHALATION) at 13:28

## 2025-04-15 NOTE — DISCHARGE NOTE NURSING/CASE MANAGEMENT/SOCIAL WORK - PATIENT PORTAL LINK FT
You can access the FollowMyHealth Patient Portal offered by [facility] by registering at the following website: [email or web address]. By joining [employer]’s FollowMyHealth portal, you will also be able to view your health information using other applications (apps) compatible with our system.

## 2025-04-15 NOTE — DISCHARGE NOTE PROVIDER - NSDCMRMEDTOKEN_GEN_ALL_CORE_FT
Albuterol (Eqv-ProAir HFA) 90 mcg/inh inhalation aerosol: 2 puff(s) inhaled every 4 hours   AMITRIPTYLINE 25MG TABLETS: 1 tab(s) orally once a day (at bedtime)  ascorbic acid 500 mg oral tablet: 1 tab(s) orally once a day  azithromycin 500 mg oral tablet: 1 tab(s) orally once a day  BACLOFEN 20MG TAB: 1 tab(s) orally every 8 hours  BISACODYL 10MG SUPPOSITORIES: 1 each rectal once a day (at bedtime), As Needed for constipation  cefpodoxime 200 mg oral tablet: 1 tab(s) orally 2 times a day  cyclobenzaprine 5 mg oral tablet: 1 tab(s) orally 3 times a day, As needed, Muscle Spasm  gabapentin 400 mg oral capsule: 1 cap(s) orally 2 times a day Take one capsule at 5 AM and one at 1PM  melatonin 5 mg oral tablet: 1 tab(s) orally once a day (at bedtime)  Multiple Vitamins oral tablet: 1 tab(s) orally once a day  polyethylene glycol 3350 oral powder for reconstitution: 17 gram(s) orally once a day  senna oral tablet: 2 tab(s) orally every 24 hours  Tylenol 325 mg oral capsule: 2 cap(s) orally every 6 hours as needed for Pain   Albuterol (Eqv-ProAir HFA) 90 mcg/inh inhalation aerosol: 2 puff(s) inhaled every 4 hours   AMITRIPTYLINE 25MG TABLETS: 1 tab(s) orally once a day (at bedtime)  ascorbic acid 500 mg oral tablet: 1 tab(s) orally once a day  azithromycin 500 mg oral tablet: 1 tab(s) orally once a day  BACLOFEN 20MG TAB: 1 tab(s) orally every 8 hours  BISACODYL 10MG SUPPOSITORIES: 1 each rectal once a day (at bedtime), As Needed for constipation  cefpodoxime 200 mg oral tablet: 1 tab(s) orally 2 times a day  cyclobenzaprine 5 mg oral tablet: 1 tab(s) orally 3 times a day, As needed, Muscle Spasm  gabapentin 400 mg oral capsule: 1 cap(s) orally 2 times a day Take one capsule at 5 AM and one at 1PM  melatonin 5 mg oral tablet: 1 tab(s) orally once a day (at bedtime)  Multiple Vitamins oral tablet: 1 tab(s) orally once a day  polyethylene glycol 3350 oral powder for reconstitution: 17 gram(s) orally once a day  predniSONE 20 mg oral tablet: 2 tab(s) orally once a day  senna oral tablet: 2 tab(s) orally every 24 hours  Tylenol 325 mg oral capsule: 2 cap(s) orally every 6 hours as needed for Pain   Albuterol (Eqv-ProAir HFA) 90 mcg/inh inhalation aerosol: 2 puff(s) inhaled every 4 hours   AMITRIPTYLINE 25MG TABLETS: 1 tab(s) orally once a day (at bedtime)  ascorbic acid 500 mg oral tablet: 1 tab(s) orally once a day  BACLOFEN 20MG TAB: 1 tab(s) orally every 8 hours  BISACODYL 10MG SUPPOSITORIES: 1 each rectal once a day (at bedtime), As Needed for constipation  cefpodoxime 200 mg oral tablet: 1 tab(s) orally 2 times a day  cyclobenzaprine 5 mg oral tablet: 1 tab(s) orally 3 times a day, As needed, Muscle Spasm  gabapentin 400 mg oral capsule: 1 cap(s) orally 2 times a day Take one capsule at 5 AM and one at 1PM  melatonin 5 mg oral tablet: 1 tab(s) orally once a day (at bedtime)  Multiple Vitamins oral tablet: 1 tab(s) orally once a day  polyethylene glycol 3350 oral powder for reconstitution: 17 gram(s) orally once a day  predniSONE 20 mg oral tablet: 2 tab(s) orally once a day  senna oral tablet: 2 tab(s) orally every 24 hours  Tylenol 325 mg oral capsule: 2 cap(s) orally every 6 hours as needed for Pain

## 2025-04-15 NOTE — DIETITIAN INITIAL EVALUATION ADULT - NSFNSPHYEXAMSKINFT_GEN_A_CORE
Pressure Injury 1: Left:, buttocks, Stage I  Pressure Injury 2: Left:, heel, Stage II  Pressure Injury 3: none, none  Pressure Injury 4: none, none  Pressure Injury 5: none, none  Pressure Injury 6: none, none  Pressure Injury 7: none, none  Pressure Injury 8: none, none  Pressure Injury 9: none, none  Pressure Injury 10: none, none  Pressure Injury 11: none, none, Pressure Injury 1: Left:, buttocks, Stage I  Pressure Injury 2: Left:, heel, Stage II  Pressure Injury 3: none, none  Pressure Injury 4: none, none  Pressure Injury 5: none, none  Pressure Injury 6: none, none  Pressure Injury 7: none, none  Pressure Injury 8: none, none  Pressure Injury 9: none, none  Pressure Injury 10: none, none  Pressure Injury 11: none, none

## 2025-04-15 NOTE — DISCHARGE NOTE NURSING/CASE MANAGEMENT/SOCIAL WORK - NSDCPEFALRISK_GEN_ALL_CORE
For information on Fall & Injury Prevention, visit: [email or web address] OR  [email or web address] OR  https://www.cdc.gov/steadi/patient.html

## 2025-04-15 NOTE — DIETITIAN INITIAL EVALUATION ADULT - OTHER INFO
54yo F w/ pmhx of Pneumonia requiring intubation in 2023 s/p trach with reversal in 2024, MI 2/2 birth control induced hypercoagulability in 2001, quadriplegia secondary to neck fracture, bladder resection s/p urostomy, chronic pain, and constipation who presents with AHRF 2/2 PNA with concern for possible Asthma exacerbation.    Pt assessed in room on 7LA. Rx and labs reviewed. Pt presents with no overt signs of nutritional wasting. Pt received resting in bed, alert and participatory in nutrition assessment. Pt with significant hx for tracheostomy and now status post reversal 2024. Quadriplegia. Managed for AHRF secondary to PNA and c/f enterovirus. Pt reports a poor appetite and reflective PO intake; states she got the wrong meal for lunch. Offered pt alternatives; agreeable to strawberry smoothie -brought to pts bedside. Dislikes Ensure; recommend fortified strawberry vanilla smoothie BIDLD. Discussed diet therapy and rationale. Encouraged good PO intake; pt agreeable and verbalized understanding. Continue liberalized diet as able. C/o constipation; last bowel movement 4/15 and on bowel care rx. No complaints of difficult chew/swallow. NKA to food. RDN to remain available, see recommendations below.     Pain: 0 per chart review   GI: Abdomen non-distended/non-tender, +BS x4, last bowel movement 4/15 -constipation   Skin: pressure injury stage I L buttocks, pressure injury stage II L heel, +1 generalized edema

## 2025-04-15 NOTE — DIETITIAN INITIAL EVALUATION ADULT - ADD RECOMMEND
-Continue liberalize diet as medically able   -Add fortified strawberry vanilla smoothie (230 calories, 17 gProt. /ea.) BID  -Encourage good PO intake   -Honor food preferences as able  -Weekly wts  -Monitor chemistry, GI function, and skin integrity

## 2025-04-15 NOTE — DISCHARGE NOTE PROVIDER - HOSPITAL COURSE
55F with PMH of pneumonia (requiring intubation in 2023 s/p trach with reversal in 2024), MI (2/2 birth control induced hypercoagulability in 2001), quadriplegia 2/2 neck fracture, bladder resection s/p urostomy, chronic pain, and constipation; who presents with SOB, found to be +entero/rhinovirus and L sided consolidation; admitted for AHRF iso reactive airway disease and PNA, requiring BiPAP initially in MICU, weaned to NC and stepped down to 7l[facility]. Stable on 3-4L NC and weaned to RA. Briefly hypotensive but likely incorrect cuff placement, resolved with 1L bolus. Receiving treatment for CAP with CTX/azithromycin and on 1 week prednisone regimen for reactive airway disease given increased wheezing noted on physical exam.     Patient states that she can get her home health aid reinstated at home without any issues and states that her cousin can take care of her during the interim. She reports she can get her HHA reinstated tonight or tomorrow morning. Patient also states she does not need any transport to get home, she lives close by and if she needs transport then she would be able to call it on her own. Will send remain days of abx of cefpodoxime 200mg q12 x3days, 1 day of azithromycin, and prednisone 40mg x5days.     Patient was medically optimized, stable and ready for discharge. Plan of care and return precautions were discussed with the patient who verbally stated understanding.    New medications/therapies: Cefpodoxime 200mg q12 x3days, Azithromycin 500mg x1 day, prednisone 40mg x5days   Follow up with your PCP     Discharge plan: discharge to home    Physical Exam Upon Discharge:  T(C): 36.6 (04-15-25 @ 17:28), Max: 36.9 (04-14-25 @ 19:00)  HR: 82 (04-15-25 @ 16:11) (72 - 96)  BP: 116/63 (04-15-25 @ 16:11) (73/42 - 209/120)  RR: 17 (04-15-25 @ 16:11) (17 - 20)  SpO2: 94% (04-15-25 @ 16:11) (90% - 98%)    General: NAD, laying in bed, speaking in full sentences.   HEENT: head NC/AT, no conjunctival injection, EOMI, MMM  Neck: supple, no JVD  Cardio: RRR, +S1/S2, no M/R/G  Resp: lungs CTAB, no cough/wheezes/rales/rhonchi  Abdo: Abd mildly distended, NT, +BS   Extremities: WWP, no edema  Vasc: 2+ radial and DP pulses b/l  Neuro: A&Ox3  Psych: speech non-pressured, thoughts goal-oriented  Skin: dry, intact, no visible jaundice   MSK: no joint swelling       55F with PMH of pneumonia (requiring intubation in 2023 s/p trach with reversal in 2024), MI (2/2 birth control induced hypercoagulability in 2001), quadriplegia 2/2 neck fracture, bladder resection s/p urostomy, chronic pain, and constipation; who presents with SOB, found to be +entero/rhinovirus and L sided consolidation; admitted for AHRF iso reactive airway disease and PNA, requiring BiPAP initially in MICU, weaned to NC and stepped down to 7lachman. Stable on 3-4L NC and weaned to RA. Briefly hypotensive but likely incorrect cuff placement, resolved with 1L bolus. Receiving treatment for CAP with CTX/azithromycin and on 1 week prednisone regimen for reactive airway disease given increased wheezing noted on physical exam.     Patient states that she can get her home health aid reinstated at home without by herself and states that her cousin can take care of her during the interim. She reports she can get her HHA reinstated tonight or tomorrow morning. Patient also states she does not need any transport to get home, she lives close by and if she needs transport then she would be able to call it on her own. Will send remain days of abx of cefpodoxime 200mg q12 x3days, 1 day of azithromycin, and prednisone 40mg x5days.     Patient was medically optimized, stable and ready for discharge. Plan of care and return precautions were discussed with the patient who verbally stated understanding.    New medications/therapies: Cefpodoxime 200mg q12 x3days, Azithromycin 500mg x1 day, prednisone 40mg x5days   Follow up with your PCP     Discharge plan: discharge to home    Physical Exam Upon Discharge:  T(C): 36.6 (04-15-25 @ 17:28), Max: 36.9 (04-14-25 @ 19:00)  HR: 82 (04-15-25 @ 16:11) (72 - 96)  BP: 116/63 (04-15-25 @ 16:11) (73/42 - 209/120)  RR: 17 (04-15-25 @ 16:11) (17 - 20)  SpO2: 94% (04-15-25 @ 16:11) (90% - 98%)    General: NAD, laying in bed, speaking in full sentences.   HEENT: head NC/AT, no conjunctival injection, EOMI, MMM  Neck: supple, no JVD  Cardio: RRR, +S1/S2, no M/R/G  Resp: lungs CTAB, no cough/wheezes/rales/rhonchi  Abdo: Abd mildly distended, NT, +BS   Extremities: WWP, no edema  Vasc: 2+ radial and DP pulses b/l  Neuro: A&Ox3  Psych: speech non-pressured, thoughts goal-oriented  Skin: dry, intact, no visible jaundice   MSK: no joint swelling

## 2025-04-15 NOTE — DIETITIAN INITIAL EVALUATION ADULT - PERTINENT LABORATORY DATA
04-15    132[L]  |  95[L]  |  9   ----------------------------<  68[L]  3.9   |  20[L]  |  0.21[L]    Ca    9.5      15 Apr 2025 05:30  Phos  3.5     04-15  Mg     2.1     04-15    TPro  7.4  /  Alb  4.4  /  TBili  0.3  /  DBili  x   /  AST  See Note  /  ALT  19  /  AlkPhos  110  04-15

## 2025-04-15 NOTE — PROGRESS NOTE ADULT - ASSESSMENT
56yo F w/ pmhx of Pneumonia requiring intubation in 2023 s/p trach with reversal in 2024, MI 2/2 birth control induced hypercoagulability in 2001, quadriplegia secondary to neck fracture, bladder resection s/p urostomy, chronic pain, and constipation who presents with AHRF 2/2 PNA with concern for possible Asthma exacerbation    Neuro  #Quadriplegia   #spasms  Home meds: baclofen, amitryptyline, flexeril, gabapentin  Resumed home meds as respiratory status has improved  -c/w baclofen  -c/w amitriptyline   -c/w gabapentin  -c/w flexeril PRN    Cardiovascular  #Hx of MI without stents  Patient endorses hx of MI in 2001 due to hypercoagulable state while on birth control. Denies any interventions or stent placement  Plan:  - VIKTOR  - obtain collateral upon discharge    Pulm  #AHRF  #PNA  #Enterovirus  Patient presents with AHRF requiring increasing oxygen support, eventually placed on Bipap. Patient denies official diagnosis of asthma but endorses wheeze on arrival, home med of albuterol. Found to have L sided consolidation on POCUS, also found to be + Enterovirus. ABG done in ED was after patient was on Bipap for only a few minutes. Shows normal pH with pCO2 of 37 indicating patient was likely tiring out and heading towards worsening respiratory status. Recieved 125mg Solumedrol in ED, duonebs, and magnesium.  Plan:  - c/w BiPAP o/n  - on 3-4L NC, wean to RA as tolerated  - c/w standing duonebs q4h  - c/w Solumedrol 40mg qd to complete 7 day course (4/15-4/21)  - antibiotics as below    GI  #Constipation  -C/w bowel regimen   -tap water enemas PRN  -simethicone PRN      #PPI on steroids  -protonix daily      #bladder resection s/p urostomy  UA negative on arrival, urostomy bag with yellow urine without sediment    Renal  VIKTOR    ID  #PNA  #Enterovirus  Patient presents with AHRF 2/2 PNA. L sided consolidation noted on POCUS. R side normal. Possibly viral PNA vs superimposed bacterial infection (CAP). Was prescribed Amoxicillin outpatient without improvement in symptoms. Urine strep/legionella negative.     Plan:  - c/w CTX   - c/w azithromycin (4/14-4/16)  - as above for AHRF    F: none  E: replete as needed  N: regular  PPI: protonix  DVT: lovenox  Dispo: 7lach

## 2025-04-15 NOTE — PROGRESS NOTE ADULT - ATTENDING COMMENTS
Acute respiratory failure in setting of viral pneumonia in setting of neuromuscular disease. Treat with steroids and nebs, CAP antibiotics. Has done well on NIV overnight with resolution of respiratory distress; should be considered for this as outpt due to neuromuscular disease. Doing well on NC. NIV at night. Transfer to [facility]. Rest as per above.
Clinically improved hypoxic resp failure secondary to CAP. complete Ceftriaxone course 5 days and encourage po intake. Discharge planning to set up home health aides.

## 2025-04-15 NOTE — DISCHARGE NOTE NURSING/CASE MANAGEMENT/SOCIAL WORK - FINANCIAL ASSISTANCE
[facility] provides services at a reduced cost to those who are determined to be eligible through [facility]’s financial assistance program. Information regarding [facility]’s financial assistance program can be found by going to [email or web address] or by calling [phone].

## 2025-04-15 NOTE — DIETITIAN INITIAL EVALUATION ADULT - PERTINENT MEDS FT
MEDICATIONS  (STANDING):  albuterol/ipratropium for Nebulization 3 milliLiter(s) Nebulizer every 4 hours  amitriptyline 25 milliGRAM(s) Oral every 24 hours  ascorbic acid 500 milliGRAM(s) Oral daily  baclofen 20 milliGRAM(s) Oral every 8 hours  cefTRIAXone   IVPB 2000 milliGRAM(s) IV Intermittent every 24 hours  chlorhexidine 2% Cloths 1 Application(s) Topical <User Schedule>  enoxaparin Injectable 40 milliGRAM(s) SubCutaneous every 24 hours  gabapentin 300 milliGRAM(s) Oral every 8 hours  melatonin 5 milliGRAM(s) Oral at bedtime  multivitamin 1 Tablet(s) Oral daily  polyethylene glycol 3350 17 Gram(s) Oral every 12 hours  predniSONE   Tablet 40 milliGRAM(s) Oral every 24 hours  senna 2 Tablet(s) Oral at bedtime    MEDICATIONS  (PRN):  bisacodyl Suppository 10 milliGRAM(s) Rectal at bedtime PRN Constipation  cyclobenzaprine 5 milliGRAM(s) Oral three times a day PRN Muscle Spasm  racepinephrine  2.25% Inhalation 0.5 milliLiter(s) Inhalation once PRN Worsening wheezing/dyspnea  sodium chloride 0.65% Nasal 1 Spray(s) Both Nostrils every 8 hours PRN Nasal Congestion

## 2025-04-15 NOTE — DISCHARGE NOTE PROVIDER - NSDCCPCAREPLAN_GEN_ALL_CORE_FT
PRINCIPAL DISCHARGE DIAGNOSIS  Diagnosis: Pneumonia  Assessment and Plan of Treatment: You were found to have a pneumonia. We started treatment with antibiotics. It is important that you finish the antibiotics. If you develop shortness of breath or wheezing please return to the nearest emergency room.   Pneumonia is an infection that inflames the air sacs in one or both lungs. The air sacs may fill with fluid or pus (purulent material), causing cough with phlegm or pus, fever, chills, and difficulty breathing. A variety of organisms, including bacteria, viruses and fungi, can cause pneumonia.  Pneumonia can range in seriousness from mild to life-threatening. It is most serious for infants and young children, people older than age 65, and people with health problems or weakened immune systems.      SECONDARY DISCHARGE DIAGNOSES  Diagnosis: RAD (reactive airway disease) with wheezing  Assessment and Plan of Treatment: You wheezing and shortness of breath was due to reactive airway disease. The treatment for this is prednisone. Please finish your prednisoen 40mg once a day for 5 days. If you develop any more shortness of breath or wheezing, please return to the emergency room.  What is reactive airway disease?  “Reactive airway disease” (RAD) is a term that healthcare providers use to describe breathing symptoms that are similar to asthma, but they’re not sure of the exact cause. Your symptoms develop when the tubes that carry air to and from your lungs (bronchial tubes) swell, which causes narrowing of them. This makes it difficult for air to move into and out of your lungs, resulting in difficulty breathing.  Reactive airway disease isn’t the same as reactive airways dysfunction syndrome (RADS). The names, acronyms and symptoms are similar, but it’s important to keep them separate.  RAD isn’t an official clinical diagnosis, and it doesn’t have a precise definition. There’s controversy in the medical community over its use because some providers use the terms RAD and asthma interchangeably, but they don’t have the same meaning. Its use should be limited to being a placeholder term until providers can make an official diagnosis.

## 2025-04-21 DIAGNOSIS — Z93.6 OTHER ARTIFICIAL OPENINGS OF URINARY TRACT STATUS: ICD-10-CM
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
## 2025-04-21 DIAGNOSIS — B34.1 ENTEROVIRUS INFECTION, UNSPECIFIED: ICD-10-CM

## 2025-04-21 DIAGNOSIS — J15.9 UNSPECIFIED BACTERIAL PNEUMONIA: ICD-10-CM

## 2025-04-21 DIAGNOSIS — J45.909 UNSPECIFIED ASTHMA, UNCOMPLICATED: ICD-10-CM

## 2025-04-21 DIAGNOSIS — I25.2 OLD MYOCARDIAL INFARCTION: ICD-10-CM

## 2025-04-21 DIAGNOSIS — J12.89 OTHER VIRAL PNEUMONIA: ICD-10-CM

## 2025-04-21 DIAGNOSIS — R06.02 SHORTNESS OF BREATH: ICD-10-CM

## 2025-04-21 DIAGNOSIS — J96.01 ACUTE RESPIRATORY FAILURE WITH HYPOXIA: ICD-10-CM

## 2025-04-21 DIAGNOSIS — Z99.3 DEPENDENCE ON WHEELCHAIR: ICD-10-CM

## 2025-04-21 DIAGNOSIS — K59.00 CONSTIPATION, UNSPECIFIED: ICD-10-CM

## 2025-04-21 DIAGNOSIS — Z87.01 PERSONAL HISTORY OF PNEUMONIA (RECURRENT): ICD-10-CM

## 2025-04-21 DIAGNOSIS — G89.29 OTHER CHRONIC PAIN: ICD-10-CM

## 2025-04-21 DIAGNOSIS — G62.9 POLYNEUROPATHY, UNSPECIFIED: ICD-10-CM

## 2025-04-21 DIAGNOSIS — S14.109S UNSPECIFIED INJURY AT UNSPECIFIED LEVEL OF CERVICAL SPINAL CORD, SEQUELA: ICD-10-CM

## 2025-04-21 DIAGNOSIS — Z87.891 PERSONAL HISTORY OF NICOTINE DEPENDENCE: ICD-10-CM

## 2025-04-21 DIAGNOSIS — F12.10 CANNABIS ABUSE, UNCOMPLICATED: ICD-10-CM

## 2025-04-21 DIAGNOSIS — Z90.6 ACQUIRED ABSENCE OF OTHER PARTS OF URINARY TRACT: ICD-10-CM

## 2025-04-21 DIAGNOSIS — E87.1 HYPO-OSMOLALITY AND HYPONATREMIA: ICD-10-CM
